# Patient Record
Sex: FEMALE | Race: WHITE | Employment: FULL TIME | URBAN - METROPOLITAN AREA
[De-identification: names, ages, dates, MRNs, and addresses within clinical notes are randomized per-mention and may not be internally consistent; named-entity substitution may affect disease eponyms.]

---

## 2017-01-29 ENCOUNTER — APPOINTMENT (OUTPATIENT)
Dept: GENERAL RADIOLOGY | Facility: HOSPITAL | Age: 42
End: 2017-01-29
Attending: EMERGENCY MEDICINE
Payer: MEDICAID

## 2017-01-29 ENCOUNTER — HOSPITAL ENCOUNTER (EMERGENCY)
Facility: HOSPITAL | Age: 42
Discharge: HOME | End: 2017-01-29
Attending: EMERGENCY MEDICINE
Payer: MEDICAID

## 2017-01-29 ENCOUNTER — APPOINTMENT (OUTPATIENT)
Dept: GENERAL RADIOLOGY | Facility: HOSPITAL | Age: 42
End: 2017-01-29
Payer: MEDICAID

## 2017-01-29 VITALS
RESPIRATION RATE: 16 BRPM | OXYGEN SATURATION: 95 % | DIASTOLIC BLOOD PRESSURE: 81 MMHG | WEIGHT: 188 LBS | SYSTOLIC BLOOD PRESSURE: 125 MMHG | HEIGHT: 63 IN | HEART RATE: 96 BPM | BODY MASS INDEX: 33.31 KG/M2

## 2017-01-29 DIAGNOSIS — K59.00 CONSTIPATION, UNSPECIFIED CONSTIPATION TYPE: ICD-10-CM

## 2017-01-29 DIAGNOSIS — R07.89 CHEST PAIN, ATYPICAL: Primary | ICD-10-CM

## 2017-01-29 LAB
ANION GAP SERPL CALC-SCNC: 9 MMOL/L (ref 0–18)
BASOPHILS # BLD: 0.1 K/UL (ref 0–0.2)
BASOPHILS NFR BLD: 1 %
BUN SERPL-MCNC: 14 MG/DL (ref 8–20)
BUN/CREAT SERPL: 13.3 (ref 10–20)
CALCIUM SERPL-MCNC: 9.4 MG/DL (ref 8.5–10.5)
CHLORIDE SERPL-SCNC: 104 MMOL/L (ref 95–110)
CO2 SERPL-SCNC: 26 MMOL/L (ref 22–32)
CREAT SERPL-MCNC: 1.05 MG/DL (ref 0.5–1.5)
EOSINOPHIL # BLD: 0.2 K/UL (ref 0–0.7)
EOSINOPHIL NFR BLD: 2 %
ERYTHROCYTE [DISTWIDTH] IN BLOOD BY AUTOMATED COUNT: 14.2 % (ref 11–15)
GLUCOSE SERPL-MCNC: 98 MG/DL (ref 70–99)
HCT VFR BLD AUTO: 41.8 % (ref 35–48)
HGB BLD-MCNC: 14 G/DL (ref 12–16)
LIPASE SERPL-CCNC: 22 U/L (ref 22–51)
LYMPHOCYTES # BLD: 2.7 K/UL (ref 1–4)
LYMPHOCYTES NFR BLD: 25 %
MCH RBC QN AUTO: 29 PG (ref 27–32)
MCHC RBC AUTO-ENTMCNC: 33.6 G/DL (ref 32–37)
MCV RBC AUTO: 86.4 FL (ref 80–100)
MONOCYTES # BLD: 1 K/UL (ref 0–1)
MONOCYTES NFR BLD: 9 %
NEUTROPHILS # BLD AUTO: 6.8 K/UL (ref 1.8–7.7)
NEUTROPHILS NFR BLD: 63 %
OSMOLALITY UR CALC.SUM OF ELEC: 288 MOSM/KG (ref 275–295)
PLATELET # BLD AUTO: 413 K/UL (ref 140–400)
PMV BLD AUTO: 8.2 FL (ref 7.4–10.3)
POTASSIUM SERPL-SCNC: 3.5 MMOL/L (ref 3.3–5.1)
RBC # BLD AUTO: 4.84 M/UL (ref 3.7–5.4)
SODIUM SERPL-SCNC: 139 MMOL/L (ref 136–144)
TROPONIN I SERPL-MCNC: 0 NG/ML (ref ?–0.03)
TROPONIN I SERPL-MCNC: 0.01 NG/ML (ref ?–0.03)
WBC # BLD AUTO: 10.8 K/UL (ref 4–11)

## 2017-01-29 PROCEDURE — 71020 XR CHEST PA + LAT CHEST (CPT=71020): CPT

## 2017-01-29 PROCEDURE — 84484 ASSAY OF TROPONIN QUANT: CPT | Performed by: EMERGENCY MEDICINE

## 2017-01-29 PROCEDURE — 93010 ELECTROCARDIOGRAM REPORT: CPT | Performed by: EMERGENCY MEDICINE

## 2017-01-29 PROCEDURE — 85025 COMPLETE CBC W/AUTO DIFF WBC: CPT

## 2017-01-29 PROCEDURE — 36415 COLL VENOUS BLD VENIPUNCTURE: CPT

## 2017-01-29 PROCEDURE — 84484 ASSAY OF TROPONIN QUANT: CPT

## 2017-01-29 PROCEDURE — 99284 EMERGENCY DEPT VISIT MOD MDM: CPT

## 2017-01-29 PROCEDURE — 74000 XR ABDOMEN (1 VIEW) (CPT=74000): CPT

## 2017-01-29 PROCEDURE — 80048 BASIC METABOLIC PNL TOTAL CA: CPT

## 2017-01-29 PROCEDURE — 83690 ASSAY OF LIPASE: CPT | Performed by: EMERGENCY MEDICINE

## 2017-01-29 PROCEDURE — 93005 ELECTROCARDIOGRAM TRACING: CPT

## 2017-01-29 NOTE — ED INITIAL ASSESSMENT (HPI)
Pt to ed27 from home for c/o sharp chest pain that radiates to her right arm and right side of neck that has been ongoing for about an hour. Pt states she is sob.

## 2017-01-29 NOTE — ED PROVIDER NOTES
Patient Seen in: [facility] Emergency Department    History   Patient presents with:  Chest Pain Angina (cardiovascular)    Stated Complaint: chest pain    HPI    Patient is a 40-year-old female who presents to the emergency room with a chief comp Smoking Status: Never Smoker                      Alcohol Use: No                Review of Systems    Positive for stated complaint: chest pain  Other systems are as noted in HPI. Constitutional and vital signs reviewed.       All other systems reviewed The following orders were created for panel order CBC WITH DIFFERENTIAL WITH PLATELET.   Procedure                               Abnormality         Status                     ---------                               -----------         ------

## 2017-01-29 NOTE — ED NOTES
Pt to ed27 from home for c/o sharp chest pain that awoke her from her sleep about an hour ago. Pain radiates to her right neck and right arm. Pt also states she feels sob.  Pt stated this has happened to her before a few months ago and all cardiac tests wer

## 2017-06-27 ENCOUNTER — OFFICE VISIT (OUTPATIENT)
Dept: FAMILY MEDICINE CLINIC | Facility: CLINIC | Age: 42
End: 2017-06-27

## 2017-06-27 VITALS
DIASTOLIC BLOOD PRESSURE: 66 MMHG | TEMPERATURE: 99 F | HEIGHT: 63 IN | SYSTOLIC BLOOD PRESSURE: 97 MMHG | WEIGHT: 192 LBS | HEART RATE: 88 BPM | BODY MASS INDEX: 34.02 KG/M2

## 2017-06-27 DIAGNOSIS — R42 VERTIGO: Primary | ICD-10-CM

## 2017-06-27 PROBLEM — E03.9 ACQUIRED HYPOTHYROIDISM: Status: ACTIVE | Noted: 2017-06-27

## 2017-06-27 PROBLEM — F32.A DEPRESSION: Status: ACTIVE | Noted: 2017-06-27

## 2017-06-27 PROBLEM — K21.9 GASTROESOPHAGEAL REFLUX DISEASE: Status: ACTIVE | Noted: 2017-06-27

## 2017-06-27 PROCEDURE — 99213 OFFICE O/P EST LOW 20 MIN: CPT | Performed by: PHYSICIAN ASSISTANT

## 2017-06-27 PROCEDURE — 99212 OFFICE O/P EST SF 10 MIN: CPT | Performed by: PHYSICIAN ASSISTANT

## 2017-06-27 RX ORDER — PANTOPRAZOLE SODIUM 40 MG/1
40 TABLET, DELAYED RELEASE ORAL DAILY
Refills: 3 | COMMUNITY
Start: 2017-05-09 | End: 2019-02-25

## 2017-06-27 RX ORDER — PAROXETINE 30 MG/1
20 TABLET, FILM COATED ORAL DAILY
Refills: 0 | COMMUNITY
Start: 2017-05-11 | End: 2017-06-27

## 2017-06-27 RX ORDER — PAROXETINE 30 MG/1
30 TABLET, FILM COATED ORAL DAILY
Qty: 30 TABLET | Refills: 5 | Status: SHIPPED | OUTPATIENT
Start: 2017-06-27 | End: 2017-12-17

## 2017-06-27 RX ORDER — BUPROPION HYDROCHLORIDE 150 MG/1
150 TABLET ORAL DAILY
Refills: 0 | COMMUNITY
Start: 2017-05-11 | End: 2017-06-27

## 2017-06-27 RX ORDER — BUPROPION HYDROCHLORIDE 150 MG/1
150 TABLET ORAL DAILY
Qty: 30 TABLET | Refills: 5 | Status: SHIPPED | OUTPATIENT
Start: 2017-06-27 | End: 2017-12-17

## 2017-06-27 RX ORDER — ONDANSETRON 4 MG/1
4 TABLET, FILM COATED ORAL EVERY 8 HOURS PRN
Qty: 30 TABLET | Refills: 0 | Status: SHIPPED | OUTPATIENT
Start: 2017-06-27 | End: 2018-08-21

## 2017-06-27 RX ORDER — LEVONORGESTREL 52 MG/1
INTRAUTERINE DEVICE INTRAUTERINE
Refills: 0 | COMMUNITY
Start: 2017-05-01

## 2017-06-27 NOTE — PROGRESS NOTES
HPI:    Patient ID: Jim Bundy is a 42year old female. Patient presents for severe dizziness and vomiting for past few days.   She has history of positional vertigo and states in past would have to lay in bed for a couple days then symptoms would r 30 tablet Rfl: 4   Albuterol Sulfate HFA (PROAIR HFA) 108 (90 BASE) MCG/ACT Inhalation Aero Soln Inhale  into the lungs. Disp:  Rfl:    Multiple Vitamin (MULTIVITAMINS) Oral Cap Take  by mouth.  Disp:  Rfl:    MIRENA, 52 MG, 20 MCG/24HR Intrauterine IUD  Nicky Chauhan Imaging & Referrals:  NEURO - INTERNAL       KV#[id number]

## 2017-08-16 ENCOUNTER — OFFICE VISIT (OUTPATIENT)
Dept: FAMILY MEDICINE CLINIC | Facility: CLINIC | Age: 42
End: 2017-08-16

## 2017-08-16 VITALS
SYSTOLIC BLOOD PRESSURE: 104 MMHG | BODY MASS INDEX: 33.63 KG/M2 | HEIGHT: 64 IN | DIASTOLIC BLOOD PRESSURE: 71 MMHG | HEART RATE: 88 BPM | WEIGHT: 197 LBS

## 2017-08-16 DIAGNOSIS — K13.0 LIP LESION: ICD-10-CM

## 2017-08-16 DIAGNOSIS — Z00.00 ROUTINE GENERAL MEDICAL EXAMINATION AT A HEALTH CARE FACILITY: Primary | ICD-10-CM

## 2017-08-16 DIAGNOSIS — E66.9 OBESITY (BMI 30.0-34.9): ICD-10-CM

## 2017-08-16 PROBLEM — D35.2 PITUITARY ADENOMA (HCC): Status: ACTIVE | Noted: 2017-08-16

## 2017-08-16 PROCEDURE — 99396 PREV VISIT EST AGE 40-64: CPT | Performed by: PHYSICIAN ASSISTANT

## 2017-08-16 NOTE — PROGRESS NOTES
HPI:   Bj Ray is a 42year old female who presents for physical exam.  She has history of hypothyroidism, pituitary adenoma, GERD and recurring positional vertigo. She has had recent endoscopy and colonoscopy and sees endocrinology regularly.   Carmina Past Surgical History:  2011: COLONOSCOPY  9/24/2012: ELECTROCARDIOGRAM, COMPLETE      Comment: scanned to media tab  No date: TUBAL LIGATION  2011: UPPER GI ENDOSCOPY,EXAM      Comment: EGD    Family History:     Family History   Problem Relation Age of anemia, bleeding or bruising. LYMPHATICS:  Denies enlarged nodes or history of splenectomy. ENDOCRINOLOGIC:  Denies excessive sweating, cold or heat intolerance, polyuria or polydipsia.   ALLERGIES:  Denies severe allergic response, history of asthma, sne year (around 8/16/2018).     HALLEY Tillman  8/16/2017

## 2017-10-16 ENCOUNTER — OFFICE VISIT (OUTPATIENT)
Dept: DERMATOLOGY CLINIC | Facility: CLINIC | Age: 42
End: 2017-10-16

## 2017-10-16 DIAGNOSIS — D48.5 NEOPLASM OF UNCERTAIN BEHAVIOR OF SKIN: Primary | ICD-10-CM

## 2017-10-16 DIAGNOSIS — B00.9 HERPES SIMPLEX VIRUS (HSV) INFECTION: ICD-10-CM

## 2017-10-16 PROCEDURE — 99202 OFFICE O/P NEW SF 15 MIN: CPT | Performed by: DERMATOLOGY

## 2017-10-16 PROCEDURE — 88305 TISSUE EXAM BY PATHOLOGIST: CPT | Performed by: DERMATOLOGY

## 2017-10-16 PROCEDURE — 99212 OFFICE O/P EST SF 10 MIN: CPT | Performed by: DERMATOLOGY

## 2017-10-16 PROCEDURE — 11100 BIOPSY OF SKIN LESION: CPT | Performed by: DERMATOLOGY

## 2017-10-16 RX ORDER — LEVOTHYROXINE SODIUM 88 UG/1
TABLET ORAL
Refills: 2 | COMMUNITY
Start: 2017-09-19

## 2017-10-16 RX ORDER — PREDNISOLONE ACETATE 10 MG/ML
SUSPENSION/ DROPS OPHTHALMIC
Refills: 0 | COMMUNITY
Start: 2017-09-28 | End: 2017-10-16 | Stop reason: ALTCHOICE

## 2017-10-16 RX ORDER — VALACYCLOVIR HYDROCHLORIDE 1 G/1
2 TABLET, FILM COATED ORAL 2 TIMES DAILY
Qty: 4 TABLET | Refills: 6 | Status: SHIPPED | OUTPATIENT
Start: 2017-10-16 | End: 2017-11-15

## 2017-10-16 NOTE — PROGRESS NOTES
Past Medical History:   Diagnosis Date   • Anxiety 2013    Zoloft   • Asthma    • Benign tumor of pituitary gland [facility])    • Chest pain 2011    hospitalize   • Depression     mes--stopped on own   • GERD (gastroesophageal reflux disease)    • IBS (irritabl

## 2017-10-16 NOTE — PROGRESS NOTES
HPI:     Chief Complaint     Lesion        HPI     Lesion    Additional comments: \"Established Pt\"- LOV- 3-11-14 with Dr. Rose Tierney presents today with brown lesions to lip one had x 1 year and others appeared over summer. Pt notes size change with lesion. Rfl:    Multiple Vitamin (MULTIVITAMINS) Oral Cap Take  by mouth. Disp:  Rfl:    Pantoprazole Sodium 40 MG Oral Tab EC Take 40 mg by mouth daily.  Disp:  Rfl: 3     Allergies:     Amoxicillin             Hives  Potassium               Hives  Vancomycin hyperpigmentation and then a very small medium brown spot just lateral to that. 2.  There are a few resolving blisters at the edge of the right lip. 3.  No present adenopathy.       ASSESSMENT/PLAN:   Neoplasm of uncertain behavior of skin  (primary encou

## 2017-10-19 ENCOUNTER — TELEPHONE (OUTPATIENT)
Dept: DERMATOLOGY CLINIC | Facility: CLINIC | Age: 42
End: 2017-10-19

## 2017-10-25 ENCOUNTER — HOSPITAL ENCOUNTER (EMERGENCY)
Facility: HOSPITAL | Age: 42
Discharge: HOME | End: 2017-10-25
Attending: EMERGENCY MEDICINE
Payer: MEDICAID

## 2017-10-25 ENCOUNTER — APPOINTMENT (OUTPATIENT)
Dept: GENERAL RADIOLOGY | Facility: HOSPITAL | Age: 42
End: 2017-10-25
Attending: EMERGENCY MEDICINE
Payer: MEDICAID

## 2017-10-25 VITALS
OXYGEN SATURATION: 96 % | WEIGHT: 198 LBS | TEMPERATURE: 98 F | SYSTOLIC BLOOD PRESSURE: 109 MMHG | BODY MASS INDEX: 35.08 KG/M2 | HEART RATE: 83 BPM | HEIGHT: 63 IN | RESPIRATION RATE: 16 BRPM | DIASTOLIC BLOOD PRESSURE: 67 MMHG

## 2017-10-25 DIAGNOSIS — R07.9 ACUTE CHEST PAIN: Primary | ICD-10-CM

## 2017-10-25 PROCEDURE — 36415 COLL VENOUS BLD VENIPUNCTURE: CPT

## 2017-10-25 PROCEDURE — 85379 FIBRIN DEGRADATION QUANT: CPT | Performed by: EMERGENCY MEDICINE

## 2017-10-25 PROCEDURE — 81025 URINE PREGNANCY TEST: CPT

## 2017-10-25 PROCEDURE — 93010 ELECTROCARDIOGRAM REPORT: CPT | Performed by: EMERGENCY MEDICINE

## 2017-10-25 PROCEDURE — 80048 BASIC METABOLIC PNL TOTAL CA: CPT | Performed by: EMERGENCY MEDICINE

## 2017-10-25 PROCEDURE — 93005 ELECTROCARDIOGRAM TRACING: CPT

## 2017-10-25 PROCEDURE — 85025 COMPLETE CBC W/AUTO DIFF WBC: CPT | Performed by: EMERGENCY MEDICINE

## 2017-10-25 PROCEDURE — 84484 ASSAY OF TROPONIN QUANT: CPT | Performed by: EMERGENCY MEDICINE

## 2017-10-25 PROCEDURE — 99285 EMERGENCY DEPT VISIT HI MDM: CPT

## 2017-10-25 PROCEDURE — 71010 XR CHEST AP PORTABLE  (CPT=71010): CPT | Performed by: EMERGENCY MEDICINE

## 2017-10-25 NOTE — ED PROVIDER NOTES
Patient Seen in: [facility] AND [facility] Emergency Department    History   Patient presents with:  Chest Pain Angina (cardiovascular)    Stated Complaint:     HPI    69-year-old female presents for complaint of chest pain and shortness of breath for the past 2 Cancer Other      great aunt       Smoking status: Never Smoker                                                              Smokeless tobacco: Never Used                      Alcohol use: No                Review of Systems   Constitutional: Negative. Psychiatric: She has a normal mood and affect. Her behavior is normal.   Nursing note and vitals reviewed.             ED Course     Labs Reviewed   BASIC METABOLIC PANEL (8) - Abnormal; Notable for the following:        Result Value    Glucose 109 (*) normal limit +1  __X__ < normal limit 0    Heart Score  __X__ 0-3: 0.9-1.7% risk of adverse cardiac event at 6 weeks. In the HEART Score, these patients were discharged. ____ 4-6: 12-16.6% risk of adverse cardiac event.    ____ >6: 50-65% risk of adverse c understanding and agreement with the treatment plan. All questions were addressed and answered.               Disposition and Plan     Clinical Impression:  Acute chest pain  (primary encounter diagnosis)    Disposition:  Discharge    Follow-up:  Shelli Thakur No

## 2017-10-27 ENCOUNTER — OFFICE VISIT (OUTPATIENT)
Dept: FAMILY MEDICINE CLINIC | Facility: CLINIC | Age: 42
End: 2017-10-27

## 2017-10-27 VITALS
HEART RATE: 93 BPM | HEIGHT: 63 IN | DIASTOLIC BLOOD PRESSURE: 79 MMHG | SYSTOLIC BLOOD PRESSURE: 123 MMHG | TEMPERATURE: 98 F | BODY MASS INDEX: 35.08 KG/M2 | WEIGHT: 198 LBS

## 2017-10-27 DIAGNOSIS — R42 VERTIGO: Primary | ICD-10-CM

## 2017-10-27 DIAGNOSIS — H81.10 BENIGN PAROXYSMAL POSITIONAL VERTIGO, UNSPECIFIED LATERALITY: ICD-10-CM

## 2017-10-27 DIAGNOSIS — R07.9 CHEST PAIN, UNSPECIFIED TYPE: ICD-10-CM

## 2017-10-27 PROCEDURE — 99212 OFFICE O/P EST SF 10 MIN: CPT | Performed by: PHYSICIAN ASSISTANT

## 2017-10-27 PROCEDURE — 99213 OFFICE O/P EST LOW 20 MIN: CPT | Performed by: PHYSICIAN ASSISTANT

## 2017-10-27 NOTE — PROGRESS NOTES
HPI:    Patient ID: Katalina See is a 36year old female. Patient presents for follow up from ER 10/25/17 for chest pains. She denies pain at this time. Cardiac workup was normal in ER. Troponin and D-dimer negative.   Patient states few days ago l 24 Hr Take 1 tablet (150 mg total) by mouth daily. Disp: 30 tablet Rfl: 5   Ondansetron HCl (ZOFRAN) 4 mg tablet Take 1 tablet (4 mg total) by mouth every 8 (eight) hours as needed for Nausea.  Disp: 30 tablet Rfl: 0   Albuterol Sulfate HFA (PROAIR HFA) 108

## 2017-10-31 ENCOUNTER — APPOINTMENT (OUTPATIENT)
Dept: LAB | Facility: HOSPITAL | Age: 42
End: 2017-10-31
Attending: INTERNAL MEDICINE
Payer: MEDICAID

## 2017-10-31 DIAGNOSIS — E66.9 OBESITY (BMI 30-39.9): ICD-10-CM

## 2017-10-31 DIAGNOSIS — F32.9 REACTIVE DEPRESSION: ICD-10-CM

## 2017-10-31 DIAGNOSIS — R06.83 SNORING: ICD-10-CM

## 2017-10-31 DIAGNOSIS — K21.9 GASTROESOPHAGEAL REFLUX DISEASE WITHOUT ESOPHAGITIS: ICD-10-CM

## 2017-10-31 DIAGNOSIS — R73.09 ABNORMAL BLOOD SUGAR: ICD-10-CM

## 2017-10-31 DIAGNOSIS — F51.01 PRIMARY INSOMNIA: ICD-10-CM

## 2017-10-31 PROBLEM — F50.2: Status: ACTIVE | Noted: 2017-10-31

## 2017-10-31 PROBLEM — R60.0 LOWER EXTREMITY EDEMA: Status: ACTIVE | Noted: 2017-10-31

## 2017-10-31 PROBLEM — F50.81 BINGE EATING DISORDER: Status: ACTIVE | Noted: 2017-10-31

## 2017-10-31 PROBLEM — F50.25 BULIMIA NERVOSA IN REMISSION: Status: ACTIVE | Noted: 2017-10-31

## 2017-10-31 PROBLEM — F50.2 BULIMIA NERVOSA IN REMISSION: Status: ACTIVE | Noted: 2017-10-31

## 2017-10-31 PROBLEM — F50.819 BINGE EATING DISORDER: Status: ACTIVE | Noted: 2017-10-31

## 2017-10-31 PROCEDURE — 83036 HEMOGLOBIN GLYCOSYLATED A1C: CPT

## 2017-10-31 PROCEDURE — 84425 ASSAY OF VITAMIN B-1: CPT

## 2017-10-31 PROCEDURE — 36415 COLL VENOUS BLD VENIPUNCTURE: CPT

## 2017-10-31 NOTE — PROGRESS NOTES
[facility] Consultation Note       Date of Consult:  10/31/2017    Patient:  Amy Lee  :      1975  MRN:      [id number]    Referring Provider: Dr. Rajni Mathews       Chief Complaint:  Patient presents with:  Consult  Weight Ma Sodium 88 MCG Oral Tab TK 1 T PO D Disp:  Rfl: 2   ValACYclovir HCl 1 G Oral Tab Take 2 tablets (2,000 mg total) by mouth 2 (two) times daily.  Take for 1 day at first sign or symptom Disp: 4 tablet Rfl: 6   PARoxetine HCl 30 MG Oral Tab Take 1 tablet (30 m Typical Dietary Intake:  Breakfast AM Snack Lunch PM Snack Dinner   Eggs, oatmeal, cereal, toast, raisin bran, 2%, tea with creamer  pb and j sandwich, chips, cranberry grape juice, fast food Candy bar, cookies, popcorn, chips,  Fast food, pasta, symptoms of heartburn or indigestion.       Encounter Diagnosis(ses):   Reactive depression  (primary encounter diagnosis)  Primary insomnia  Gastroesophageal reflux disease without esophagitis  Snoring  Lower extremity edema  Abnormal blood sugar  Obesity has to have an appointment to have the medication refilled. Will start at 15 mg    ekg recently done    Depression: has therapist    Hx of bulimia: always have to be careful about seizures.    Still admits to throwing up (not on purpose)  On wellbutrin fo

## 2017-11-01 ENCOUNTER — TELEPHONE (OUTPATIENT)
Dept: SURGERY | Facility: CLINIC | Age: 42
End: 2017-11-01

## 2017-11-01 NOTE — TELEPHONE ENCOUNTER
Per Dr. Kristine Munoz request, I called in Phentermine 15mg- take one capsule daily- #30- 1 refill to [facility] pharmacy ([phone]). Called Patient and informed her of call to pharmacy.

## 2017-11-07 ENCOUNTER — OFFICE VISIT (OUTPATIENT)
Dept: OTOLARYNGOLOGY | Facility: CLINIC | Age: 42
End: 2017-11-07

## 2017-11-07 ENCOUNTER — TELEPHONE (OUTPATIENT)
Dept: SURGERY | Facility: CLINIC | Age: 42
End: 2017-11-07

## 2017-11-07 VITALS
DIASTOLIC BLOOD PRESSURE: 56 MMHG | BODY MASS INDEX: 34.57 KG/M2 | HEART RATE: 68 BPM | HEIGHT: 63.7 IN | WEIGHT: 200 LBS | SYSTOLIC BLOOD PRESSURE: 104 MMHG

## 2017-11-07 DIAGNOSIS — R42 VERTIGO: Primary | ICD-10-CM

## 2017-11-07 PROCEDURE — 99203 OFFICE O/P NEW LOW 30 MIN: CPT | Performed by: OTOLARYNGOLOGY

## 2017-11-07 PROCEDURE — 99212 OFFICE O/P EST SF 10 MIN: CPT | Performed by: OTOLARYNGOLOGY

## 2017-11-07 NOTE — PROGRESS NOTES
Ben Rodriguez is a 36year old female. Patient presents with:  Consult: Vertigo w/vomiting for 4 years on/off & involuntary eye movement. Practiced at home Vertigo Exercise. Tried Meclizine and Bonine.  Has never had a hearing test. Reports 7 episodes wit Depression     mes--stopped on own   • GERD (gastroesophageal reflux disease)    • Hypercholesteremia    • IBS (irritable bowel syndrome)    • Insomnia 2013    Zoloft   • MRSA (methicillin resistant Staphylococcus aureus) 2012   • Obesity (BMI 30-39. 9) spinning vertigo. She has a history of a pituitary tumor. I recommended an MRI of her brain and internal auditory canals to rule out a central cause. If negative, we can consider vestibular testing  - MRI IACS (W+WO) (CPT=70553);  Future  - MRI BRAIN (W+WO)

## 2017-11-07 NOTE — TELEPHONE ENCOUNTER
Spoke with patient about lab results WNL and also asked if she can continue her stool softer . Advised to continue as it was not noted to stop.

## 2017-11-21 ENCOUNTER — TELEPHONE (OUTPATIENT)
Dept: OTOLARYNGOLOGY | Facility: CLINIC | Age: 42
End: 2017-11-21

## 2017-11-21 NOTE — TELEPHONE ENCOUNTER
Patient checking status of MRI of brain. [facility] was told someone would call her once approved to schedule. [facility] has been waiting since 11/07. Please call. Once approved.  Thank you

## 2017-11-25 NOTE — TELEPHONE ENCOUNTER
LMTCB. Email message sent via [employer]; still awaiting determination from insurance RE:  MRI.

## 2017-11-27 NOTE — TELEPHONE ENCOUNTER
Patient calling back. Informed of message below. And reminded her RN will call once authorization was approved.  Patient understood

## 2017-11-28 NOTE — TELEPHONE ENCOUNTER
Rec'd fax from [place] [employer]: MRI BRAIN/IACS has been denied. Dr. Azra Dent, please advise if you would like to go forward w/ [id number]. Fax and LOV note has been forwarded to your bin for review.

## 2017-11-30 NOTE — TELEPHONE ENCOUNTER
[id number] completed; awaiting decision from [employer] Ari[employer]. Per , Dr. Rip Sagastume will send information to Medical Director for review and determination will be made by 17. Pt informed and verbalized understanding.

## 2017-12-06 PROBLEM — Z51.81 ENCOUNTER FOR THERAPEUTIC DRUG MONITORING: Status: ACTIVE | Noted: 2017-12-06

## 2017-12-06 NOTE — PROGRESS NOTES
[address], [facility]  [address] [facility] [address] [facility] [address]  Dept: [phone]     Date:   2017    Patient:  Nancy Whitlock  :      1975  MRN:      [id number]    Chief Complaint: PARoxetine HCl 30 MG Oral Tab Take 1 tablet (30 mg total) by mouth daily. Disp: 30 tablet Rfl: 5   BuPROPion HCl ER, XL, 150 MG Oral Tablet 24 Hr Take 1 tablet (150 mg total) by mouth daily.  Disp: 30 tablet Rfl: 5   Ondansetron HCl (ZOFRAN) 4 mg tablet T meal:  20  · # of snacks per day: 1 Type of snacks:  Grapes, fruit  · Amount of soda consumption per day:  0  · Amount of water (in ounces) per day:  64  · Drinking between meals only:  yes  · Toughest challenge:  craving    Nutritional Goals  Limit carboh edema  Encounter for therapeutic drug monitoring  Obesity (bmi 30-39. 9)    PLAN   No orders of the defined types were placed in this encounter. Patient is not interested in bariatric surgery.  Patient desires to pursue traditional weight loss at this mami

## 2017-12-07 NOTE — TELEPHONE ENCOUNTER
Pt informed of the below information regarding denial of MRI after [id number]. Pt states she is okay but has some dizzy spells every now and then. Pt states she has a \"funky feeling\" when she opens her mouth, like her ears popping on an airplane.  Pt

## 2017-12-07 NOTE — TELEPHONE ENCOUNTER
Called pt's insurance [phone], spoke with Deleta Crew from [employer], after clinical review by the medical director the case for MRI brain has been denied. Appeal information will be faxed to the our office within 24 hours, reference number [id number].

## 2017-12-08 ENCOUNTER — OFFICE VISIT (OUTPATIENT)
Dept: OTOLARYNGOLOGY | Facility: CLINIC | Age: 42
End: 2017-12-08

## 2017-12-08 ENCOUNTER — OFFICE VISIT (OUTPATIENT)
Dept: AUDIOLOGY | Facility: CLINIC | Age: 42
End: 2017-12-08

## 2017-12-08 VITALS
TEMPERATURE: 98 F | DIASTOLIC BLOOD PRESSURE: 62 MMHG | WEIGHT: 186 LBS | BODY MASS INDEX: 32.96 KG/M2 | SYSTOLIC BLOOD PRESSURE: 118 MMHG | HEIGHT: 63 IN

## 2017-12-08 DIAGNOSIS — H90.3 BILATERAL HIGH FREQUENCY SENSORINEURAL HEARING LOSS: Primary | ICD-10-CM

## 2017-12-08 DIAGNOSIS — H69.83 DYSFUNCTION OF BOTH EUSTACHIAN TUBES: Primary | ICD-10-CM

## 2017-12-08 DIAGNOSIS — R42 VERTIGO: ICD-10-CM

## 2017-12-08 PROCEDURE — 92557 COMPREHENSIVE HEARING TEST: CPT | Performed by: AUDIOLOGIST

## 2017-12-08 PROCEDURE — 99213 OFFICE O/P EST LOW 20 MIN: CPT | Performed by: OTOLARYNGOLOGY

## 2017-12-08 PROCEDURE — 92567 TYMPANOMETRY: CPT | Performed by: AUDIOLOGIST

## 2017-12-08 PROCEDURE — 99212 OFFICE O/P EST SF 10 MIN: CPT | Performed by: OTOLARYNGOLOGY

## 2017-12-08 NOTE — PROGRESS NOTES
Orville Herrera is a 36year old female. Patient presents with:  Ear Problem: patient presents for popping sensation of both ears    HPI:   Her vertigo has not been giving her as much difficulty in the last 2 weeks.  Her MRI was denied by her insurance comp Never Smoker                                                              Smokeless tobacco: Never Used                      Alcohol use:  No                 REVIEW OF SYSTEMS:   GENERAL HEALTH: feels well otherwise  GENERAL : denies fever, chills, sweats, problem for her for many years. Her insurance company denied her MRI. She is to return if problemscontinue      The patient indicates understanding of these issues and agrees to the plan. Omkar Sifuentes MD  12/8/2017  9:49 AM

## 2017-12-08 NOTE — PROGRESS NOTES
AUDIOGRAM     Greg Martinez was referred for testing by Gerri Parker M.D.due to c/o ears popping. 9/16/1975  [id number]    Otoscopic Inspection:  Right ear:  No cerumen  Left ear:  No cerumen    Audiometric Test Results:   Audiometric thresholds indic

## 2017-12-12 ENCOUNTER — TELEPHONE (OUTPATIENT)
Dept: OTOLARYNGOLOGY | Facility: CLINIC | Age: 42
End: 2017-12-12

## 2017-12-12 NOTE — TELEPHONE ENCOUNTER
Pt saw  on , states she was supposed to be prescribed a nasal spray, pt has contacted pharmacy and no prescriptions have been received. Pls advise thank you.

## 2017-12-12 NOTE — TELEPHONE ENCOUNTER
LMTCB. Per , pt may use antihistamines (Zyrtec, Allegra, or Claritin) and nasal spray such as Flonase.

## 2017-12-18 RX ORDER — PAROXETINE 30 MG/1
TABLET, FILM COATED ORAL
Qty: 30 TABLET | Refills: 0 | Status: SHIPPED | OUTPATIENT
Start: 2017-12-18 | End: 2018-01-22

## 2017-12-18 RX ORDER — BUPROPION HYDROCHLORIDE 150 MG/1
TABLET ORAL
Qty: 30 TABLET | Refills: 0 | Status: SHIPPED | OUTPATIENT
Start: 2017-12-18 | End: 2018-01-26 | Stop reason: DRUGHIGH

## 2017-12-18 NOTE — TELEPHONE ENCOUNTER
for Psychiatric Non-Scheduled (Anti-Anxiety)  Refill Protocol Appointment Criteria  · Appointment scheduled in the past 6 months or in the next 3 months  Recent Outpatient Visits            1 week ago Bilateral high frequency sensorineural hearing loss

## 2018-01-08 DIAGNOSIS — F51.01 PRIMARY INSOMNIA: ICD-10-CM

## 2018-01-08 DIAGNOSIS — F32.9 REACTIVE DEPRESSION: ICD-10-CM

## 2018-01-08 DIAGNOSIS — R06.83 SNORING: ICD-10-CM

## 2018-01-08 DIAGNOSIS — R73.09 ABNORMAL BLOOD SUGAR: ICD-10-CM

## 2018-01-08 DIAGNOSIS — K21.9 GASTROESOPHAGEAL REFLUX DISEASE WITHOUT ESOPHAGITIS: ICD-10-CM

## 2018-01-08 DIAGNOSIS — E66.9 OBESITY (BMI 30-39.9): ICD-10-CM

## 2018-01-08 DIAGNOSIS — R60.0 LOWER EXTREMITY EDEMA: ICD-10-CM

## 2018-01-09 RX ORDER — PHENTERMINE HYDROCHLORIDE 15 MG/1
CAPSULE ORAL
Qty: 30 CAPSULE | Refills: 1 | Status: SHIPPED | OUTPATIENT
Start: 2018-01-09 | End: 2018-02-06 | Stop reason: DRUGHIGH

## 2018-01-09 RX ORDER — HYDROCHLOROTHIAZIDE 12.5 MG/1
CAPSULE, GELATIN COATED ORAL
Qty: 30 CAPSULE | Refills: 0 | Status: SHIPPED | OUTPATIENT
Start: 2018-01-09 | End: 2018-06-05

## 2018-01-10 ENCOUNTER — TELEPHONE (OUTPATIENT)
Dept: SURGERY | Facility: CLINIC | Age: 43
End: 2018-01-10

## 2018-01-10 NOTE — TELEPHONE ENCOUNTER
Spoke to [facility]  From [facility], refill ok'd on phentermine hcl 15mg oral cap #30 one  refill.

## 2018-01-23 RX ORDER — PAROXETINE 30 MG/1
TABLET, FILM COATED ORAL
Qty: 30 TABLET | Refills: 5 | Status: SHIPPED | OUTPATIENT
Start: 2018-01-23 | End: 2018-07-15

## 2018-01-26 ENCOUNTER — OFFICE VISIT (OUTPATIENT)
Dept: FAMILY MEDICINE CLINIC | Facility: CLINIC | Age: 43
End: 2018-01-26

## 2018-01-26 VITALS
WEIGHT: 176 LBS | TEMPERATURE: 98 F | SYSTOLIC BLOOD PRESSURE: 103 MMHG | DIASTOLIC BLOOD PRESSURE: 72 MMHG | BODY MASS INDEX: 31 KG/M2 | HEART RATE: 80 BPM

## 2018-01-26 DIAGNOSIS — F41.1 GENERALIZED ANXIETY DISORDER: ICD-10-CM

## 2018-01-26 DIAGNOSIS — K59.09 CHRONIC CONSTIPATION: Primary | ICD-10-CM

## 2018-01-26 PROCEDURE — 99212 OFFICE O/P EST SF 10 MIN: CPT | Performed by: PHYSICIAN ASSISTANT

## 2018-01-26 PROCEDURE — 99213 OFFICE O/P EST LOW 20 MIN: CPT | Performed by: PHYSICIAN ASSISTANT

## 2018-01-26 RX ORDER — BUPROPION HYDROCHLORIDE 100 MG/1
100 TABLET ORAL 2 TIMES DAILY
Qty: 60 TABLET | Refills: 0 | Status: SHIPPED | OUTPATIENT
Start: 2018-01-26 | End: 2018-04-04

## 2018-01-26 NOTE — PROGRESS NOTES
HPI:    Patient ID: Orville Herrera is a 36year old female. Patient presents for problems with chronic constipation. She states only has bowel movement about once per week and only after using an enema.   She has tried many medications in past for cons MG Oral Cap TAKE ONE CAPSULE BY MOUTH EVERY MORNING Disp: 30 capsule Rfl: 1   Levothyroxine Sodium 88 MCG Oral Tab TK 1 T PO D Disp:  Rfl: 2   MIRENA, 52 MG, 20 MCG/24HR Intrauterine IUD  Disp:  Rfl: 0   Pantoprazole Sodium 40 MG Oral Tab EC Take 40 mg by placed in this encounter. Meds This Visit:  Signed Prescriptions Disp Refills    BuPROPion HCl 100 MG Oral Tab 60 tablet 0      Sig: Take 1 tablet (100 mg total) by mouth 2 (two) times daily.            Imaging & Referrals:  GASTRO - INTERNAL       ID#

## 2018-02-06 NOTE — PROGRESS NOTES
[address] [place] [address] [place]  [facility] [address] [facility] [address]  Dept: [phone]     Date:   2017    Patient:  Chavo Evans  :      1975  MRN:      [id number]    Chief Complaint: Levothyroxine Sodium 88 MCG Oral Tab TK 1 T PO D Disp:  Rfl: 2   Pantoprazole Sodium 40 MG Oral Tab EC Take 40 mg by mouth daily. Disp:  Rfl: 3   Albuterol Sulfate HFA (PROAIR HFA) 108 (90 BASE) MCG/ACT Inhalation Aero Soln Inhale  into the lungs.  Disp: 20  · # of snacks per day: 1 Type of snacks:  Grapes, fruit  · Amount of soda consumption per day:  0  · Amount of water (in ounces) per day:  64  · Drinking between meals only:  yes  · Toughest challenge:  craving    Nutritional Goals  Limit carbohydrates monitoring  Obesity (bmi 30-39. 9)    PLAN   No orders of the defined types were placed in this encounter. Patient is not interested in bariatric surgery. Patient desires to pursue traditional weight loss at this time.       NON SUICIDAL DEPRESSION: Mood

## 2018-02-21 ENCOUNTER — TELEPHONE (OUTPATIENT)
Dept: FAMILY MEDICINE CLINIC | Facility: CLINIC | Age: 43
End: 2018-02-21

## 2018-02-21 DIAGNOSIS — E66.9 OBESITY, UNSPECIFIED CLASSIFICATION, UNSPECIFIED OBESITY TYPE, UNSPECIFIED WHETHER SERIOUS COMORBIDITY PRESENT: Primary | ICD-10-CM

## 2018-02-21 NOTE — TELEPHONE ENCOUNTER
Pt was directly transferred to nurse station phone. Pt said she had been continuously transferred. Consuelo Davenport- Pt requesting orders for TSH/CMP or whatever labs you would advise. Pt said she has been very fatigued and a lot of headaches.  Also her restle

## 2018-02-21 NOTE — TELEPHONE ENCOUNTER
Pt called requesting follow-up visits with Sara Monroy. Please sign referral if you agree.  Thank you, [facility]

## 2018-02-23 ENCOUNTER — LAB ENCOUNTER (OUTPATIENT)
Dept: LAB | Age: 43
End: 2018-02-23
Attending: PHYSICIAN ASSISTANT
Payer: MEDICAID

## 2018-02-23 ENCOUNTER — OFFICE VISIT (OUTPATIENT)
Dept: FAMILY MEDICINE CLINIC | Facility: CLINIC | Age: 43
End: 2018-02-23

## 2018-02-23 VITALS
HEART RATE: 82 BPM | SYSTOLIC BLOOD PRESSURE: 107 MMHG | TEMPERATURE: 98 F | DIASTOLIC BLOOD PRESSURE: 70 MMHG | WEIGHT: 172 LBS | BODY MASS INDEX: 30 KG/M2

## 2018-02-23 DIAGNOSIS — E03.9 ACQUIRED HYPOTHYROIDISM: ICD-10-CM

## 2018-02-23 DIAGNOSIS — H81.10 POSTURAL VERTIGO, UNSPECIFIED LATERALITY: ICD-10-CM

## 2018-02-23 DIAGNOSIS — R53.83 FATIGUE, UNSPECIFIED TYPE: ICD-10-CM

## 2018-02-23 DIAGNOSIS — R53.83 FATIGUE, UNSPECIFIED TYPE: Primary | ICD-10-CM

## 2018-02-23 DIAGNOSIS — G25.81 RESTLESS LEG SYNDROME: ICD-10-CM

## 2018-02-23 PROBLEM — IMO0002 POSTURAL VERTIGO: Status: ACTIVE | Noted: 2017-06-27

## 2018-02-23 LAB
ALBUMIN SERPL BCP-MCNC: 4.1 G/DL (ref 3.5–4.8)
ALBUMIN/GLOB SERPL: 1.3 {RATIO} (ref 1–2)
ALP SERPL-CCNC: 75 U/L (ref 32–100)
ALT SERPL-CCNC: 17 U/L (ref 14–54)
ANION GAP SERPL CALC-SCNC: 9 MMOL/L (ref 0–18)
AST SERPL-CCNC: 15 U/L (ref 15–41)
BASOPHILS # BLD: 0.1 K/UL (ref 0–0.2)
BASOPHILS NFR BLD: 1 %
BILIRUB SERPL-MCNC: 0.6 MG/DL (ref 0.3–1.2)
BUN SERPL-MCNC: 12 MG/DL (ref 8–20)
BUN/CREAT SERPL: 12.6 (ref 10–20)
CALCIUM SERPL-MCNC: 9.4 MG/DL (ref 8.5–10.5)
CHLORIDE SERPL-SCNC: 105 MMOL/L (ref 95–110)
CHOLEST SERPL-MCNC: 203 MG/DL (ref 110–200)
CO2 SERPL-SCNC: 25 MMOL/L (ref 22–32)
CREAT SERPL-MCNC: 0.95 MG/DL (ref 0.5–1.5)
EOSINOPHIL # BLD: 0.1 K/UL (ref 0–0.7)
EOSINOPHIL NFR BLD: 2 %
ERYTHROCYTE [DISTWIDTH] IN BLOOD BY AUTOMATED COUNT: 14.6 % (ref 11–15)
GLOBULIN PLAS-MCNC: 3.1 G/DL (ref 2.5–3.7)
GLUCOSE SERPL-MCNC: 81 MG/DL (ref 70–99)
HCT VFR BLD AUTO: 42.2 % (ref 35–48)
HDLC SERPL-MCNC: 45 MG/DL
HGB BLD-MCNC: 14.4 G/DL (ref 12–16)
LDLC SERPL CALC-MCNC: 144 MG/DL (ref 0–99)
LYMPHOCYTES # BLD: 1.9 K/UL (ref 1–4)
LYMPHOCYTES NFR BLD: 30 %
MAGNESIUM SERPL-MCNC: 2 MG/DL (ref 1.8–2.5)
MCH RBC QN AUTO: 29.5 PG (ref 27–32)
MCHC RBC AUTO-ENTMCNC: 34.1 G/DL (ref 32–37)
MCV RBC AUTO: 86.4 FL (ref 80–100)
MONOCYTES # BLD: 0.6 K/UL (ref 0–1)
MONOCYTES NFR BLD: 9 %
NEUTROPHILS # BLD AUTO: 3.8 K/UL (ref 1.8–7.7)
NEUTROPHILS NFR BLD: 59 %
NONHDLC SERPL-MCNC: 158 MG/DL
OSMOLALITY UR CALC.SUM OF ELEC: 287 MOSM/KG (ref 275–295)
PATIENT FASTING: YES
PLATELET # BLD AUTO: 392 K/UL (ref 140–400)
PMV BLD AUTO: 9 FL (ref 7.4–10.3)
POTASSIUM SERPL-SCNC: 4.1 MMOL/L (ref 3.3–5.1)
PROT SERPL-MCNC: 7.2 G/DL (ref 5.9–8.4)
RBC # BLD AUTO: 4.89 M/UL (ref 3.7–5.4)
SODIUM SERPL-SCNC: 139 MMOL/L (ref 136–144)
T3 SERPL-MCNC: 1.12 NG/ML (ref 0.87–1.78)
T4 FREE SERPL-MCNC: 1.32 NG/DL (ref 0.58–1.64)
TRIGL SERPL-MCNC: 69 MG/DL (ref 1–149)
TSH SERPL-ACNC: 0.38 UIU/ML (ref 0.45–5.33)
VIT B12 SERPL-MCNC: 474 PG/ML (ref 181–914)
WBC # BLD AUTO: 6.4 K/UL (ref 4–11)

## 2018-02-23 PROCEDURE — 84480 ASSAY TRIIODOTHYRONINE (T3): CPT

## 2018-02-23 PROCEDURE — 99214 OFFICE O/P EST MOD 30 MIN: CPT | Performed by: PHYSICIAN ASSISTANT

## 2018-02-23 PROCEDURE — 99212 OFFICE O/P EST SF 10 MIN: CPT | Performed by: PHYSICIAN ASSISTANT

## 2018-02-23 PROCEDURE — 36415 COLL VENOUS BLD VENIPUNCTURE: CPT

## 2018-02-23 PROCEDURE — 84439 ASSAY OF FREE THYROXINE: CPT

## 2018-02-23 PROCEDURE — 82306 VITAMIN D 25 HYDROXY: CPT

## 2018-02-23 PROCEDURE — 80061 LIPID PANEL: CPT

## 2018-02-23 PROCEDURE — 84443 ASSAY THYROID STIM HORMONE: CPT

## 2018-02-23 PROCEDURE — 85025 COMPLETE CBC W/AUTO DIFF WBC: CPT

## 2018-02-23 PROCEDURE — 83735 ASSAY OF MAGNESIUM: CPT

## 2018-02-23 PROCEDURE — 82607 VITAMIN B-12: CPT

## 2018-02-23 PROCEDURE — 80053 COMPREHEN METABOLIC PANEL: CPT

## 2018-02-23 NOTE — PROGRESS NOTES
HPI:    Patient ID: Luis Epperson is a 36year old female. Patient presents for fatigue, bruising and worsening of her restless legs syndrome for past couple of months. She has been seeing Dr. Manfred Trevino and is on phentermine.   She has lost 30 pounds in Levothyroxine Sodium 88 MCG Oral Tab TK 1 T PO D Disp:  Rfl: 2   MIRENA, 52 MG, 20 MCG/24HR Intrauterine IUD  Disp:  Rfl: 0   Pantoprazole Sodium 40 MG Oral Tab EC Take 40 mg by mouth daily.  Disp:  Rfl: 3   Ondansetron HCl (ZOFRAN) 4 mg tablet Take 1 tab Metabolic Panel (14) [E]      Lipid Panel [E]      Vitamin B12 [E]      Vitamin D, 25-Hydroxy [E]      Magnesium [E]    Meds This Visit:  No prescriptions requested or ordered in this encounter    Imaging & Referrals:  None       #2018

## 2018-02-26 LAB — 25(OH)D3 SERPL-MCNC: 37 NG/ML

## 2018-03-07 ENCOUNTER — TELEPHONE (OUTPATIENT)
Dept: FAMILY MEDICINE CLINIC | Facility: CLINIC | Age: 43
End: 2018-03-07

## 2018-03-07 ENCOUNTER — OFFICE VISIT (OUTPATIENT)
Dept: PHYSICAL THERAPY | Facility: HOSPITAL | Age: 43
End: 2018-03-07
Attending: PHYSICIAN ASSISTANT
Payer: MEDICAID

## 2018-03-07 DIAGNOSIS — H81.10 POSTURAL VERTIGO, UNSPECIFIED LATERALITY: ICD-10-CM

## 2018-03-07 DIAGNOSIS — H81.10 POSTURAL VERTIGO, UNSPECIFIED LATERALITY: Primary | ICD-10-CM

## 2018-03-07 PROCEDURE — 95992 CANALITH REPOSITIONING PROC: CPT

## 2018-03-07 PROCEDURE — 97162 PT EVAL MOD COMPLEX 30 MIN: CPT

## 2018-03-07 NOTE — PROGRESS NOTES
PHYSICAL THERAPY EVALUATION:   Referring Physician: Dr. Jovana Howard  Diagnosis: BPPV      Date of Onset: 2 weeks ago Date of Service: 3/7/2018     PATIENT SUMMARY   Samir Sims is a 36year old y/o female who presents to therapy today with reports of pos significant nausea. Nausea was mitigated successfully with cervical cold pack. Pt. Had evidence of either bilateral involvement or cupulothiasis of the right posterior canal.  Will re-assess both sides next session and intervene appropriately.   Pt. would seen for 1-2 x/week or a total of 8 visits over a 90 day period.   Treatment will include: Home exercise program development and instruction, Patient/family education, Balance training, Canalith Repositioning Maneuver, Eye/head coordination exercises, Senso

## 2018-03-07 NOTE — TELEPHONE ENCOUNTER
PT department calling patient is in the office now with a 9:45am appointment, requesting new PT orders for Evaluation and treatment as orders in system are . Shawanda Fernandez PA-C please advise.

## 2018-03-09 ENCOUNTER — TELEPHONE (OUTPATIENT)
Dept: SURGERY | Facility: CLINIC | Age: 43
End: 2018-03-09

## 2018-03-09 RX ORDER — HYDROCHLOROTHIAZIDE 12.5 MG/1
12.5 TABLET ORAL DAILY
Qty: 90 TABLET | Refills: 3 | Status: SHIPPED | OUTPATIENT
Start: 2018-03-09 | End: 2019-02-25 | Stop reason: ALTCHOICE

## 2018-03-13 ENCOUNTER — OFFICE VISIT (OUTPATIENT)
Dept: GASTROENTEROLOGY | Facility: CLINIC | Age: 43
End: 2018-03-13

## 2018-03-13 ENCOUNTER — HOSPITAL ENCOUNTER (OUTPATIENT)
Dept: GENERAL RADIOLOGY | Facility: HOSPITAL | Age: 43
Discharge: HOME | End: 2018-03-13
Attending: PHYSICIAN ASSISTANT
Payer: MEDICAID

## 2018-03-13 VITALS
WEIGHT: 171 LBS | SYSTOLIC BLOOD PRESSURE: 116 MMHG | HEIGHT: 63.5 IN | BODY MASS INDEX: 29.92 KG/M2 | HEART RATE: 108 BPM | DIASTOLIC BLOOD PRESSURE: 74 MMHG

## 2018-03-13 DIAGNOSIS — K59.00 CONSTIPATION, UNSPECIFIED CONSTIPATION TYPE: ICD-10-CM

## 2018-03-13 DIAGNOSIS — K59.00 CONSTIPATION, UNSPECIFIED CONSTIPATION TYPE: Primary | ICD-10-CM

## 2018-03-13 PROCEDURE — 99244 OFF/OP CNSLTJ NEW/EST MOD 40: CPT | Performed by: PHYSICIAN ASSISTANT

## 2018-03-13 PROCEDURE — 99212 OFFICE O/P EST SF 10 MIN: CPT | Performed by: PHYSICIAN ASSISTANT

## 2018-03-13 PROCEDURE — 74018 RADEX ABDOMEN 1 VIEW: CPT | Performed by: PHYSICIAN ASSISTANT

## 2018-03-13 NOTE — H&P
Gastroenterology attending:    I have reviewed the medical record and thorough consultation of Jasmina Guzman PA-C.   The patient has a fairly complex past medical and past gastroenterology history,as outlined by Ms Becky Coronel including but not limited to:      \"#

## 2018-03-13 NOTE — PATIENT INSTRUCTIONS
1. Call to schedule the XR of the Abdomen. 2. After the XR, start MiraLAX one capful twice daily (once in the AM, once in the PM)  - This is a powder that you can mix in a glass of water    3.  Sign a release of records for the colonoscopy and upper endo

## 2018-03-13 NOTE — H&P
[address] [facility] [address] Gastroenterology                                                                                                  Clinic History and Physical     Pa been taken by this patient in over 1 year. She states her heartburn only happens when she eats too late at night or spaghetti sauce. Other than the vomiting described above, the patient reports she used to throw up 2 nights a week.  This has since resolved Depression     mes--stopped on own   • GERD (gastroesophageal reflux disease)    • Hypercholesteremia    • IBS (irritable bowel syndrome)    • Insomnia 2013    Zoloft   • MRSA (methicillin resistant Staphylococcus aureus) 2012   • Obesity (BMI 30-39. 9) MG Oral Tab Take 1 tablet (100 mg total) by mouth 2 (two) times daily. Disp: 60 tablet Rfl: 0   Pantoprazole Sodium 40 MG Oral Tab EC Take 40 mg by mouth daily.  Disp:  Rfl: 3       Allergies:    Amoxicillin             Hives  Potassium               Hives of constipation. Most recent labs with borderline low TSH, no anemia/transaminitis. It is very difficult to focus the patient today. # Constipation: She has tried numerous therapies - too many at once, I believe, worsening her possible IBS.  Apparently

## 2018-03-14 ENCOUNTER — OFFICE VISIT (OUTPATIENT)
Dept: PHYSICAL THERAPY | Facility: HOSPITAL | Age: 43
End: 2018-03-14
Attending: PHYSICIAN ASSISTANT
Payer: MEDICAID

## 2018-03-14 ENCOUNTER — TELEPHONE (OUTPATIENT)
Dept: GASTROENTEROLOGY | Facility: CLINIC | Age: 43
End: 2018-03-14

## 2018-03-14 PROCEDURE — 97112 NEUROMUSCULAR REEDUCATION: CPT

## 2018-03-14 NOTE — PROGRESS NOTES
Dx: BPPV         Number of Visits Seen/Authorized:  2/6         Referring MD (Next MD visit): Madiha  Precautions: fall risk   Medication Changes since last visit?: No  Subjective: Pt.  Does not notice a difference in dizziness, but has been avoiding the ri

## 2018-03-16 ENCOUNTER — APPOINTMENT (OUTPATIENT)
Dept: PHYSICAL THERAPY | Facility: HOSPITAL | Age: 43
End: 2018-03-16
Attending: PHYSICIAN ASSISTANT
Payer: MEDICAID

## 2018-03-16 NOTE — TELEPHONE ENCOUNTER
Did the patient have an upper endoscopy as well? In office she informed me of a bi-directional.     C-scope time to reach cecum 5 minutes, withdrawal 6 minutes. Prep was documented as excellent. 3/7/17 - Dr. Karen Cottrell ([facility]).  Findings: small, non-thr

## 2018-03-16 NOTE — TELEPHONE ENCOUNTER
I looked in Next [employer] and in an OV note with Dr Dong Proctor dated 06/27/12. Pt states she had a Colonoscopy/EGD 2 years prior at [facility] and was dx with IBS.  She was put on a probiotic and an antispasmotic

## 2018-03-19 NOTE — TELEPHONE ENCOUNTER
[place],    I spoke to Dr Jose Jenkins office. This is where Dr Yesica Zhu used to practice. Pt did in fact have an EGD last year. I faxed a MELISSA to [phone].  They will send the op report and the pathology

## 2018-03-19 NOTE — TELEPHONE ENCOUNTER
Noted, will await. Please leave encounter open and document on this encounter when the OP/Path come in. At that time, recommendations to the patient will be discussed.

## 2018-03-19 NOTE — TELEPHONE ENCOUNTER
Correct, however the patient advised me in office that she also had an upper endoscopy performed at the time of the most recent colonoscopy performed 3/7/17.

## 2018-03-20 ENCOUNTER — APPOINTMENT (OUTPATIENT)
Dept: PHYSICAL THERAPY | Facility: HOSPITAL | Age: 43
End: 2018-03-20
Attending: PHYSICIAN ASSISTANT
Payer: MEDICAID

## 2018-03-22 ENCOUNTER — APPOINTMENT (OUTPATIENT)
Dept: PHYSICAL THERAPY | Facility: HOSPITAL | Age: 43
End: 2018-03-22
Attending: PHYSICIAN ASSISTANT
Payer: MEDICAID

## 2018-03-23 NOTE — TELEPHONE ENCOUNTER
EGD reviewed - this occurred on 3/7/17 @ [facility]   - Distal reflux esophagitis  - 2 cm hiatal hernia  - PATH: no H.  Pylori, fundic gland polyp and focalized specialized Graham's mucosa NEGATIVE for dysplasia    GI RNs please place recall 3/

## 2018-03-23 NOTE — TELEPHONE ENCOUNTER
Spoke with pt and notified. She is not taking the PPI daily, only uses when needed. She is currently asymptomatic in her upper GI/esophageal region. Inquires about her abdominal x-ray (confirmed stool).   States she has been taking miralax twice daily w

## 2018-03-26 NOTE — TELEPHONE ENCOUNTER
I discussed in office with the patient that she may have a type of Pelvic Floor Dysfunction. She has refused Amitiza, did not want to go down on the Linzess, and did not achieve results using conservative means.     Dr. Víctor Gilbert at [facility] be

## 2018-03-27 ENCOUNTER — APPOINTMENT (OUTPATIENT)
Dept: PHYSICAL THERAPY | Facility: HOSPITAL | Age: 43
End: 2018-03-27
Attending: PHYSICIAN ASSISTANT
Payer: MEDICAID

## 2018-03-28 NOTE — TELEPHONE ENCOUNTER
Spoke to pt, [employer] as shown below. Pt verbalized understanding and agreement on calling Dr. Sara Padilla as advised by PB. Pt had no further questions or concerns at this time.

## 2018-03-29 ENCOUNTER — APPOINTMENT (OUTPATIENT)
Dept: PHYSICAL THERAPY | Facility: HOSPITAL | Age: 43
End: 2018-03-29
Attending: PHYSICIAN ASSISTANT
Payer: MEDICAID

## 2018-04-04 NOTE — PROGRESS NOTES
[address], [facility]  [address] [facility] [address] [facility] [address]  Dept: [phone]     Date:   2017    Patient:  Katalina See  :      1975  MRN:      [id number]    Chief Complaint: 30 tablet Rfl: 5   HYDROCHLOROTHIAZIDE 12.5 MG Oral Cap TAKE 1 CAPSULE(12.5 MG) BY MOUTH DAILY Disp: 30 capsule Rfl: 0   Levothyroxine Sodium 88 MCG Oral Tab TK 1 T PO D Disp:  Rfl: 2   MIRENA, 52 MG, 20 MCG/24HR Intrauterine IUD  Disp:  Rfl: 0   Pantopraz exercising? yes  · Type of exercise?  Walking   · weights    Eating Habits  · Patient states the following:  · Eats 3 meal(s) per day  · Length of time it takes to consume a meal:  20  · # of snacks per day: 1 Type of snacks:  Grapes, fruit  · Amount of sod Diagnosis(ses):   Binge eating disorder  (primary encounter diagnosis)  Chronic constipation  Gastroesophageal reflux disease without esophagitis  Lower extremity edema  Encounter for therapeutic drug monitoring  Fatigue, unspecified type  Obesity (bmi 30-

## 2018-06-05 ENCOUNTER — OFFICE VISIT (OUTPATIENT)
Dept: SURGERY | Facility: CLINIC | Age: 43
End: 2018-06-05

## 2018-06-05 VITALS
OXYGEN SATURATION: 100 % | SYSTOLIC BLOOD PRESSURE: 128 MMHG | HEART RATE: 93 BPM | HEIGHT: 63.5 IN | DIASTOLIC BLOOD PRESSURE: 65 MMHG | RESPIRATION RATE: 16 BRPM | WEIGHT: 170 LBS | BODY MASS INDEX: 29.75 KG/M2

## 2018-06-05 DIAGNOSIS — F51.01 PRIMARY INSOMNIA: ICD-10-CM

## 2018-06-05 DIAGNOSIS — Z51.81 ENCOUNTER FOR THERAPEUTIC DRUG MONITORING: ICD-10-CM

## 2018-06-05 DIAGNOSIS — R06.83 SNORING: Primary | ICD-10-CM

## 2018-06-05 DIAGNOSIS — E66.9 OBESITY (BMI 30-39.9): ICD-10-CM

## 2018-06-05 DIAGNOSIS — F50.81 BINGE EATING DISORDER: ICD-10-CM

## 2018-06-05 DIAGNOSIS — F32.9 REACTIVE DEPRESSION: ICD-10-CM

## 2018-06-05 PROCEDURE — 99214 OFFICE O/P EST MOD 30 MIN: CPT | Performed by: INTERNAL MEDICINE

## 2018-06-05 RX ORDER — TOPIRAMATE 50 MG/1
50 TABLET, FILM COATED ORAL 2 TIMES DAILY
Qty: 60 TABLET | Refills: 2 | Status: SHIPPED | OUTPATIENT
Start: 2018-06-05 | End: 2018-06-28 | Stop reason: SINTOL

## 2018-06-05 RX ORDER — PHENTERMINE HYDROCHLORIDE 37.5 MG/1
37.5 TABLET ORAL
Qty: 30 TABLET | Refills: 1 | Status: SHIPPED | OUTPATIENT
Start: 2018-06-05 | End: 2018-07-26

## 2018-06-05 NOTE — PROGRESS NOTES
[address] [place] [address] [address] [facility] [address]  Dept: [phone]     Date:   2017    Patient:  Joanie De La Cruz  :      1975  MRN:      [id number]    Chief Complaint: Intrauterine IUD  Disp:  Rfl: 0   Pantoprazole Sodium 40 MG Oral Tab EC Take 40 mg by mouth daily. Disp:  Rfl: 3   Ondansetron HCl (ZOFRAN) 4 mg tablet Take 1 tablet (4 mg total) by mouth every 8 (eight) hours as needed for Nausea.  Disp: 30 tablet Rfl: 0 fruit  · Amount of soda consumption per day:  0  · Amount of water (in ounces) per day:  64  · Drinking between meals only:  yes  · Toughest challenge:  craving    Nutritional Goals  Limit carbohydrates to 100 gms per day, Eat 100-200 calories within 1 lisa placed in this encounter. Patient is not interested in bariatric surgery. Patient desires to pursue traditional weight loss at this time. NON SUICIDAL DEPRESSION: Mood stable on current medication. No changes initiated on this visit.       Binge eat

## 2018-06-27 ENCOUNTER — NURSE TRIAGE (OUTPATIENT)
Dept: OTHER | Age: 43
End: 2018-06-27

## 2018-06-27 NOTE — TELEPHONE ENCOUNTER
Action Requested: Summary for Provider     []  Critical Lab, Recommendations Needed  [] Need Additional Advice  []   FYI    []   Need Orders  [] Need Medications Sent to Pharmacy  []  Other     SUMMARY: OV made for tomorrow 6/2/8/18 at 09:30 AM in [facility]

## 2018-06-28 ENCOUNTER — OFFICE VISIT (OUTPATIENT)
Dept: FAMILY MEDICINE CLINIC | Facility: CLINIC | Age: 43
End: 2018-06-28

## 2018-06-28 ENCOUNTER — TELEPHONE (OUTPATIENT)
Dept: SURGERY | Facility: CLINIC | Age: 43
End: 2018-06-28

## 2018-06-28 VITALS
HEART RATE: 98 BPM | WEIGHT: 169 LBS | SYSTOLIC BLOOD PRESSURE: 103 MMHG | BODY MASS INDEX: 29.57 KG/M2 | HEIGHT: 63.5 IN | DIASTOLIC BLOOD PRESSURE: 72 MMHG

## 2018-06-28 DIAGNOSIS — R20.2 PARESTHESIA OF BOTH FEET: ICD-10-CM

## 2018-06-28 PROCEDURE — 99212 OFFICE O/P EST SF 10 MIN: CPT | Performed by: FAMILY MEDICINE

## 2018-06-28 PROCEDURE — 99213 OFFICE O/P EST LOW 20 MIN: CPT | Performed by: FAMILY MEDICINE

## 2018-06-28 NOTE — TELEPHONE ENCOUNTER
Dr. Sofia Dietrich,   Patient states that she is having some side effects from new medication. Please call her at ph# [phone]. Thank you.

## 2018-06-28 NOTE — PROGRESS NOTES
HPI:    Patient ID: Bernadette Latham is a 36year old female. Pt presents with some numbness or tingling of her feet that lasted a few minutes after she started topiramate for weight loss. Pt has been seeing Dr Meeta Lacy  Pt has no hx of diabetes.  Pt denies ASSESSMENT/PLAN:   Paresthesia of both feet: appears to be from her topirimate: reviewed previous lab work. No hx of DM  - After discussion, to hold topirimate; To monitor symptoms and call if worse or not better;  To follow up with Dr Nati Chand about altern

## 2018-06-28 NOTE — TELEPHONE ENCOUNTER
Called patient back to discuss side effects she was having to medication    Tingling in feet after starting topiramate    Advised to hold topiramate at this time    Will talk about other options on next visit

## 2018-07-16 RX ORDER — PAROXETINE 30 MG/1
TABLET, FILM COATED ORAL
Qty: 90 TABLET | Refills: 0 | Status: SHIPPED | OUTPATIENT
Start: 2018-07-16 | End: 2018-10-20

## 2018-07-16 NOTE — TELEPHONE ENCOUNTER
Refill Protocol Appointment Criteria  · Appointment scheduled in the past 6 months or in the next 3 months  Recent Outpatient Visits            2 weeks ago Paresthesia of both Karen Sheriff MD    Office

## 2018-07-24 DIAGNOSIS — M54.50 CHRONIC LEFT-SIDED LOW BACK PAIN WITHOUT SCIATICA: Primary | ICD-10-CM

## 2018-07-24 DIAGNOSIS — G89.29 CHRONIC LEFT-SIDED LOW BACK PAIN WITHOUT SCIATICA: Primary | ICD-10-CM

## 2018-07-24 DIAGNOSIS — M47.816 SPONDYLOSIS OF LUMBAR REGION WITHOUT MYELOPATHY OR RADICULOPATHY: ICD-10-CM

## 2018-07-26 ENCOUNTER — OFFICE VISIT (OUTPATIENT)
Dept: SURGERY | Facility: CLINIC | Age: 43
End: 2018-07-26
Payer: MEDICAID

## 2018-07-26 VITALS
RESPIRATION RATE: 18 BRPM | BODY MASS INDEX: 30.1 KG/M2 | HEIGHT: 63.5 IN | HEART RATE: 106 BPM | SYSTOLIC BLOOD PRESSURE: 110 MMHG | WEIGHT: 172 LBS | DIASTOLIC BLOOD PRESSURE: 70 MMHG | OXYGEN SATURATION: 98 %

## 2018-07-26 DIAGNOSIS — Z51.81 ENCOUNTER FOR THERAPEUTIC DRUG MONITORING: ICD-10-CM

## 2018-07-26 DIAGNOSIS — R53.83 FATIGUE, UNSPECIFIED TYPE: ICD-10-CM

## 2018-07-26 DIAGNOSIS — K21.9 GASTROESOPHAGEAL REFLUX DISEASE WITHOUT ESOPHAGITIS: Primary | ICD-10-CM

## 2018-07-26 DIAGNOSIS — F50.81 BINGE EATING DISORDER: ICD-10-CM

## 2018-07-26 DIAGNOSIS — E88.81 INSULIN RESISTANCE: ICD-10-CM

## 2018-07-26 DIAGNOSIS — R60.0 LOWER EXTREMITY EDEMA: ICD-10-CM

## 2018-07-26 DIAGNOSIS — E66.9 OBESITY (BMI 30-39.9): ICD-10-CM

## 2018-07-26 PROCEDURE — 99214 OFFICE O/P EST MOD 30 MIN: CPT | Performed by: INTERNAL MEDICINE

## 2018-07-26 RX ORDER — PHENTERMINE HYDROCHLORIDE 37.5 MG/1
37.5 TABLET ORAL
Qty: 30 TABLET | Refills: 1 | Status: SHIPPED | OUTPATIENT
Start: 2018-07-26 | End: 2018-08-05

## 2018-07-26 NOTE — PROGRESS NOTES
[address], [address] [facility] [place] [address]  Dept: [phone]     Date:   2017    Patient:  Amy Lee  :      1975  MRN:      [id number]    Chief Complaint: Rfl: 1   hydrochlorothiazide 12.5 MG Oral Tab Take 1 tablet (12.5 mg total) by mouth daily.  Disp: 90 tablet Rfl: 3   Levothyroxine Sodium 88 MCG Oral Tab TK 1 T PO D Disp:  Rfl: 2   MIRENA, 52 MG, 20 MCG/24HR Intrauterine IUD  Disp:  Rfl: 0   Pantoprazole exercise?  Walking   · weights    Eating Habits  · Patient states the following:  · Eats 3 meal(s) per day  · Length of time it takes to consume a meal:  20  · # of snacks per day: 1 Type of snacks:  Grapes, fruit  · Amount of soda consumption per day:  0 Gastroesophageal reflux disease without esophagitis  (primary encounter diagnosis)  Lower extremity edema  Binge eating disorder  Encounter for therapeutic drug monitoring  Obesity (bmi 30-39. 9)  Fatigue, unspecified type  Insulin resistance    PLAN   No

## 2018-08-06 ENCOUNTER — TELEPHONE (OUTPATIENT)
Dept: SURGERY | Facility: CLINIC | Age: 43
End: 2018-08-06

## 2018-08-06 RX ORDER — PHENTERMINE HYDROCHLORIDE 37.5 MG/1
TABLET ORAL
Qty: 30 TABLET | Refills: 0 | Status: SHIPPED | OUTPATIENT
Start: 2018-08-06 | End: 2018-09-20

## 2018-08-21 ENCOUNTER — OFFICE VISIT (OUTPATIENT)
Dept: OBGYN CLINIC | Facility: CLINIC | Age: 43
End: 2018-08-21
Payer: MEDICAID

## 2018-08-21 VITALS
SYSTOLIC BLOOD PRESSURE: 110 MMHG | DIASTOLIC BLOOD PRESSURE: 77 MMHG | BODY MASS INDEX: 30.8 KG/M2 | WEIGHT: 176 LBS | HEART RATE: 98 BPM | HEIGHT: 63.25 IN

## 2018-08-21 DIAGNOSIS — Z12.4 SCREENING FOR MALIGNANT NEOPLASM OF CERVIX: ICD-10-CM

## 2018-08-21 DIAGNOSIS — Z12.31 ENCOUNTER FOR SCREENING MAMMOGRAM FOR BREAST CANCER: ICD-10-CM

## 2018-08-21 DIAGNOSIS — Z01.419 ENCOUNTER FOR GYNECOLOGICAL EXAMINATION: Primary | ICD-10-CM

## 2018-08-21 DIAGNOSIS — Z11.3 SCREEN FOR STD (SEXUALLY TRANSMITTED DISEASE): ICD-10-CM

## 2018-08-21 PROCEDURE — 99386 PREV VISIT NEW AGE 40-64: CPT | Performed by: OBSTETRICS & GYNECOLOGY

## 2018-08-22 LAB
C TRACH DNA SPEC QL NAA+PROBE: NEGATIVE
HPV I/H RISK 1 DNA SPEC QL NAA+PROBE: NEGATIVE
N GONORRHOEA DNA SPEC QL NAA+PROBE: NEGATIVE
T VAGINALIS RRNA SPEC QL NAA+PROBE: NEGATIVE

## 2018-08-22 NOTE — PROGRESS NOTES
Bernadette Latham is a 36year old female [id number] Patient's last menstrual period was 07/21/2018. here for annual exam.       New pt. Had Mirena IUD placed 3/2017 elsewhere. Had spotting daily until recently.   Since IUD insertion, had acne on body, ears po BREAKFAST Disp: 30 tablet Rfl: 0   PAROXETINE HCL 30 MG Oral Tab TAKE 1 TABLET(30 MG) BY MOUTH DAILY Disp: 90 tablet Rfl: 0   hydrochlorothiazide 12.5 MG Oral Tab Take 1 tablet (12.5 mg total) by mouth daily.  Disp: 90 tablet Rfl: 3   Levothyroxine Sodium 8 discharge, nipple retraction or skin changes  Abdomen:  soft, nontender, nondistended, no masses  Psychiatric:  Oriented to time, place, person and situation.  Appropriate mood and affect    Pelvic Exam:  External Genitalia: normal appearance, hair distribu

## 2018-08-28 ENCOUNTER — OFFICE VISIT (OUTPATIENT)
Dept: OTOLARYNGOLOGY | Facility: CLINIC | Age: 43
End: 2018-08-28
Payer: MEDICAID

## 2018-08-28 VITALS
SYSTOLIC BLOOD PRESSURE: 122 MMHG | TEMPERATURE: 98 F | WEIGHT: 177 LBS | DIASTOLIC BLOOD PRESSURE: 67 MMHG | BODY MASS INDEX: 30.59 KG/M2 | HEIGHT: 63.6 IN

## 2018-08-28 DIAGNOSIS — J32.9 CHRONIC SINUSITIS, UNSPECIFIED LOCATION: Primary | ICD-10-CM

## 2018-08-28 PROCEDURE — 99213 OFFICE O/P EST LOW 20 MIN: CPT | Performed by: OTOLARYNGOLOGY

## 2018-08-28 PROCEDURE — 99212 OFFICE O/P EST SF 10 MIN: CPT | Performed by: OTOLARYNGOLOGY

## 2018-08-28 NOTE — PROGRESS NOTES
Normal pap and negative HPV.   But return to clinic in 1 year for annual.   Negative gc/chlamydia/trichomonas

## 2018-08-28 NOTE — PROGRESS NOTES
Jg Payne is a 36year old female.  Patient presents with:  Ear Problem: popping noise in both ears, feels the need to pop  both ears, both ears feel clogged for 1 yr    HPI:   She has been continuing to feel that her ears are plugged and she is poppi rashes  RESPIRATORY: denies shortness of breath with exertion  NEURO: denies headaches    EXAM:   /67 (BP Location: Left arm, Patient Position: Sitting, Cuff Size: adult)   Temp 97.9 °F (36.6 °C) (Tympanic)   Ht 5' 3.6\" (1.615 m)   Wt 177 lb (80.3 k

## 2018-08-29 ENCOUNTER — TELEPHONE (OUTPATIENT)
Dept: OTOLARYNGOLOGY | Facility: CLINIC | Age: 43
End: 2018-08-29

## 2018-08-29 ENCOUNTER — HOSPITAL ENCOUNTER (OUTPATIENT)
Dept: MAMMOGRAPHY | Age: 43
Discharge: HOME | End: 2018-08-29
Attending: OBSTETRICS & GYNECOLOGY
Payer: MEDICAID

## 2018-08-29 DIAGNOSIS — Z12.31 ENCOUNTER FOR SCREENING MAMMOGRAM FOR BREAST CANCER: ICD-10-CM

## 2018-08-29 PROCEDURE — 77067 SCR MAMMO BI INCL CAD: CPT | Performed by: OBSTETRICS & GYNECOLOGY

## 2018-08-29 NOTE — TELEPHONE ENCOUNTER
Prior auth pending of CT sinus. Case # [id number], clinicals faxed to [facility] phone #[phone]  (fax # [phone]) , confirmation received.

## 2018-09-11 NOTE — TELEPHONE ENCOUNTER
Prior auth denied.  would like to appeal.pt will be coming in tomorrow to sign Representative designation form. Form placed at  .

## 2018-09-20 ENCOUNTER — TELEPHONE (OUTPATIENT)
Dept: SURGERY | Facility: CLINIC | Age: 43
End: 2018-09-20

## 2018-09-20 RX ORDER — PHENTERMINE HYDROCHLORIDE 37.5 MG/1
TABLET ORAL
Qty: 30 TABLET | Refills: 0 | Status: SHIPPED | OUTPATIENT
Start: 2018-09-20 | End: 2019-02-25 | Stop reason: ALTCHOICE

## 2018-09-24 ENCOUNTER — OFFICE VISIT (OUTPATIENT)
Dept: DERMATOLOGY CLINIC | Facility: CLINIC | Age: 43
End: 2018-09-24
Payer: MEDICAID

## 2018-09-24 DIAGNOSIS — L30.9 DERMATITIS: ICD-10-CM

## 2018-09-24 DIAGNOSIS — L71.0 PERIORAL DERMATITIS: Primary | ICD-10-CM

## 2018-09-24 DIAGNOSIS — D22.9 MULTIPLE NEVI: ICD-10-CM

## 2018-09-24 PROCEDURE — 99212 OFFICE O/P EST SF 10 MIN: CPT | Performed by: DERMATOLOGY

## 2018-09-24 PROCEDURE — 99214 OFFICE O/P EST MOD 30 MIN: CPT | Performed by: DERMATOLOGY

## 2018-09-24 RX ORDER — DOXYCYCLINE HYCLATE 50 MG/1
50 CAPSULE ORAL 2 TIMES DAILY
Qty: 60 CAPSULE | Refills: 12 | Status: SHIPPED | OUTPATIENT
Start: 2018-09-24 | End: 2019-02-25 | Stop reason: ALTCHOICE

## 2018-09-24 NOTE — PATIENT INSTRUCTIONS
Retinol is the ingredient to look for. TRACE brand . Use daily to dark spots/ingrown hairs, cyst.    Glycolic acid or salaylic acid  Lotions or \"daily peel\"

## 2018-09-25 ENCOUNTER — OFFICE VISIT (OUTPATIENT)
Dept: FAMILY MEDICINE CLINIC | Facility: CLINIC | Age: 43
End: 2018-09-25
Payer: MEDICAID

## 2018-09-25 ENCOUNTER — TELEPHONE (OUTPATIENT)
Dept: OTOLARYNGOLOGY | Facility: CLINIC | Age: 43
End: 2018-09-25

## 2018-09-25 VITALS
RESPIRATION RATE: 20 BRPM | DIASTOLIC BLOOD PRESSURE: 73 MMHG | HEART RATE: 81 BPM | HEIGHT: 63 IN | BODY MASS INDEX: 31.54 KG/M2 | TEMPERATURE: 99 F | SYSTOLIC BLOOD PRESSURE: 113 MMHG | WEIGHT: 178 LBS

## 2018-09-25 DIAGNOSIS — J02.9 SORE THROAT: ICD-10-CM

## 2018-09-25 PROCEDURE — 99213 OFFICE O/P EST LOW 20 MIN: CPT | Performed by: FAMILY MEDICINE

## 2018-09-25 PROCEDURE — 99212 OFFICE O/P EST SF 10 MIN: CPT | Performed by: FAMILY MEDICINE

## 2018-09-25 RX ORDER — AZITHROMYCIN 250 MG/1
TABLET, FILM COATED ORAL
Qty: 6 TABLET | Refills: 0 | Status: SHIPPED | OUTPATIENT
Start: 2018-09-25 | End: 2018-10-25 | Stop reason: ALTCHOICE

## 2018-09-25 NOTE — PROGRESS NOTES
HPI:    Patient ID: Bernadette Latham is a 39year old female. Pt presents with sore throat for 1-2 days. Pt has had sore throat. No fevers. Pt has tried otc remedies without relief. Pt states no sick contacts.            Review of Systems   Constitutional sounds. Pulmonary/Chest: Effort normal and breath sounds normal. No respiratory distress. She has no wheezes. She has no rales. Lymphadenopathy:     She has cervical adenopathy. Vitals reviewed.              ASSESSMENT/PLAN:   Sore throat:   - After d

## 2018-10-07 NOTE — PROGRESS NOTES
Greg Martinez is a 39year old female. HPI:     CC:  Patient presents with:  Full Skin Exam: LOV 10/16/2017 with Dr. Jenny Woodward. Pt presnting for yearly full body skin exam. Pt denies personal hx of skin cancer.    Derm Problem: Pt concerned with scars and Levothyroxine Sodium 88 MCG Oral Tab TK 1 T PO D Disp:  Rfl: 2   MIRENA, 52 MG, 20 MCG/24HR Intrauterine IUD  Disp:  Rfl: 0   Pantoprazole Sodium 40 MG Oral Tab EC Take 40 mg by mouth daily.  Disp:  Rfl: 3   Multiple Vitamin (MULTIVITAMINS) Oral Cap Take and Sexual Activity      Alcohol use: No      Drug use: No      Sexual activity: Yes        Birth control/protection: IUD    Other Topics      Concerns:         Service: Not Asked        Blood Transfusions: Not Asked        Caffeine Concern:  Yes head, neck, face,nails, hair, external eyes, including conjunctival mucosa, eyelids, lips external ears, back, chest,/ breasts, axillae,  abdomen, arms, legs, palms.      Multiple light to medium brown, well marginated, uniformly pigmented, macules and papu Patient will let us know how they are doing over the next several weeks. Await clinical response to above therapy. Bruising discussed labs normal medications could be contributory. Sun protection. No other suspicious lesions. Await response.     Erica

## 2018-10-09 ENCOUNTER — TELEPHONE (OUTPATIENT)
Dept: OTOLARYNGOLOGY | Facility: CLINIC | Age: 43
End: 2018-10-09

## 2018-10-09 NOTE — TELEPHONE ENCOUNTER
Pt informed that appeal approved for CT scan , Auth # [id number] and expires 11/19/2018. Pt given number to central scheduling.

## 2018-10-19 ENCOUNTER — HOSPITAL ENCOUNTER (OUTPATIENT)
Dept: CT IMAGING | Facility: HOSPITAL | Age: 43
Discharge: HOME | End: 2018-10-19
Attending: OTOLARYNGOLOGY
Payer: MEDICAID

## 2018-10-19 DIAGNOSIS — J32.9 CHRONIC SINUSITIS, UNSPECIFIED LOCATION: ICD-10-CM

## 2018-10-19 PROCEDURE — 70486 CT MAXILLOFACIAL W/O DYE: CPT | Performed by: OTOLARYNGOLOGY

## 2018-10-21 NOTE — TELEPHONE ENCOUNTER
Please advise in regards to refill request. Thank You    Refill Protocol Appointment Criteria  · Appointment scheduled in the past 6 months or in the next 3 months  Recent Outpatient Visits            3 weeks ago Sore throat    [facility], [facility], [place]

## 2018-10-22 RX ORDER — PAROXETINE 30 MG/1
TABLET, FILM COATED ORAL
Qty: 90 TABLET | Refills: 1 | Status: SHIPPED | OUTPATIENT
Start: 2018-10-22 | End: 2019-04-19

## 2018-10-25 ENCOUNTER — OFFICE VISIT (OUTPATIENT)
Dept: OTOLARYNGOLOGY | Facility: CLINIC | Age: 43
End: 2018-10-25
Payer: MEDICAID

## 2018-10-25 VITALS
HEIGHT: 63 IN | WEIGHT: 171 LBS | BODY MASS INDEX: 30.3 KG/M2 | DIASTOLIC BLOOD PRESSURE: 60 MMHG | SYSTOLIC BLOOD PRESSURE: 102 MMHG | TEMPERATURE: 99 F

## 2018-10-25 DIAGNOSIS — H69.83 DYSFUNCTION OF BOTH EUSTACHIAN TUBES: Primary | ICD-10-CM

## 2018-10-25 PROCEDURE — 99213 OFFICE O/P EST LOW 20 MIN: CPT | Performed by: OTOLARYNGOLOGY

## 2018-10-25 PROCEDURE — 99212 OFFICE O/P EST SF 10 MIN: CPT | Performed by: OTOLARYNGOLOGY

## 2018-10-25 NOTE — PROGRESS NOTES
Nancy Whitlock is a 39year old female. Patient presents with:  Results: CT sinus done on 10-19-18    HPI:   She is in follow-up of a CT scan of her sinuses. I reviewed the CT scan myself and the findings with her.   CT demonstrates essentially clear sinu History    Tobacco Use      Smoking status: Never Smoker      Smokeless tobacco: Never Used    Alcohol use: No    Drug use: No       REVIEW OF SYSTEMS:   GENERAL HEALTH: feels well otherwise  GENERAL : denies fever, chills, sweats, weight loss, weight gain it.  We discussed management strategies. We did discuss briefly allergy testing but she is really asymptomatic from that standpoint as well.   She is to return for any problems      The patient indicates understanding of these issues and agrees to the plan

## 2018-10-29 ENCOUNTER — TELEPHONE (OUTPATIENT)
Dept: OTOLARYNGOLOGY | Facility: CLINIC | Age: 43
End: 2018-10-29

## 2018-10-29 NOTE — TELEPHONE ENCOUNTER
, pt  States she was talking to a family member about symptoms and was advised it may be TMJ?  Pt asking if she can speak with you, asking if you think this might be the cause and if she can see specialist. Please advise

## 2018-11-05 ENCOUNTER — TELEPHONE (OUTPATIENT)
Dept: OTOLARYNGOLOGY | Facility: CLINIC | Age: 43
End: 2018-11-05

## 2019-02-25 ENCOUNTER — OFFICE VISIT (OUTPATIENT)
Dept: FAMILY MEDICINE CLINIC | Facility: CLINIC | Age: 44
End: 2019-02-25
Payer: MEDICAID

## 2019-02-25 VITALS
BODY MASS INDEX: 33.66 KG/M2 | TEMPERATURE: 99 F | HEIGHT: 63 IN | DIASTOLIC BLOOD PRESSURE: 79 MMHG | WEIGHT: 190 LBS | SYSTOLIC BLOOD PRESSURE: 120 MMHG | HEART RATE: 99 BPM

## 2019-02-25 DIAGNOSIS — J45.998 SEASONAL ASTHMA: ICD-10-CM

## 2019-02-25 DIAGNOSIS — Z00.00 WELL ADULT EXAM: Primary | ICD-10-CM

## 2019-02-25 DIAGNOSIS — E03.9 HYPOTHYROIDISM, UNSPECIFIED TYPE: ICD-10-CM

## 2019-02-25 DIAGNOSIS — K59.09 CHRONIC CONSTIPATION: ICD-10-CM

## 2019-02-25 DIAGNOSIS — E66.9 OBESITY (BMI 30-39.9): ICD-10-CM

## 2019-02-25 DIAGNOSIS — F32.A ANXIETY AND DEPRESSION: ICD-10-CM

## 2019-02-25 DIAGNOSIS — F41.9 ANXIETY AND DEPRESSION: ICD-10-CM

## 2019-02-25 DIAGNOSIS — K21.9 GASTROESOPHAGEAL REFLUX DISEASE, ESOPHAGITIS PRESENCE NOT SPECIFIED: ICD-10-CM

## 2019-02-25 PROCEDURE — 99212 OFFICE O/P EST SF 10 MIN: CPT | Performed by: FAMILY MEDICINE

## 2019-02-25 PROCEDURE — 99214 OFFICE O/P EST MOD 30 MIN: CPT | Performed by: FAMILY MEDICINE

## 2019-02-25 PROCEDURE — 99396 PREV VISIT EST AGE 40-64: CPT | Performed by: FAMILY MEDICINE

## 2019-02-25 RX ORDER — PANTOPRAZOLE SODIUM 40 MG/1
40 TABLET, DELAYED RELEASE ORAL
Qty: 30 TABLET | Refills: 1 | Status: SHIPPED | OUTPATIENT
Start: 2019-02-25 | End: 2019-09-14

## 2019-02-25 RX ORDER — PAROXETINE 10 MG/1
10 TABLET, FILM COATED ORAL EVERY MORNING
Qty: 90 TABLET | Refills: 0 | Status: SHIPPED | OUTPATIENT
Start: 2019-02-25 | End: 2019-04-19

## 2019-02-25 NOTE — PROGRESS NOTES
HPI:    Patient ID: Jim Bundy is a 39year old female. HPI  Patient presents with:  Routine Physical  Referral: bariatrics      Patient here to establish care with me.   She has seen 1 of our physician assistants in the past.  She is requesting ref vomiting. Endocrine: Negative for cold intolerance, heat intolerance, polydipsia, polyphagia and polyuria.    Genitourinary: Negative for decreased urine volume, difficulty urinating, dysuria, flank pain, frequency, menstrual problem, pelvic pain, urgency file      Transportation needs:        Medical: Not on file        Non-medical: Not on file    Tobacco Use      Smoking status: Never Smoker      Smokeless tobacco: Never Used    Substance and Sexual Activity      Alcohol use: No      Drug use: No      Sex aunt age 46s         There is no immunization history on file for this patient.     Asthma Action Plan due on 09[birth year]16/1975  Asthma Control Test due on 09[birth year]16/1975  Influenza Vaccine(1) due on 09/01/2018  Annual Depression Screen due on 08/21/2019  Annual Physi and breath sounds normal. She has no wheezes. Abdominal: Soft. Bowel sounds are normal. She exhibits no mass. There is no tenderness. Musculoskeletal: She exhibits no edema or tenderness. Lymphadenopathy:     She has no cervical adenopathy.    Neurolo [E]      Lipid Panel [E]      TSH W Reflex To Free T4 [E]      Vitamin B12 [E]      Hemoglobin A1C [E]      Meds This Visit:  Requested Prescriptions     Signed Prescriptions Disp Refills   • Pantoprazole Sodium 40 MG Oral Tab EC 30 tablet 1     Sig: Take

## 2019-02-27 ENCOUNTER — LAB ENCOUNTER (OUTPATIENT)
Dept: LAB | Age: 44
End: 2019-02-27
Attending: FAMILY MEDICINE
Payer: MEDICAID

## 2019-02-27 DIAGNOSIS — Z00.00 WELL ADULT EXAM: ICD-10-CM

## 2019-02-27 DIAGNOSIS — E66.9 OBESITY (BMI 30-39.9): ICD-10-CM

## 2019-02-27 LAB
ALT SERPL-CCNC: 59 U/L (ref 13–56)
ANION GAP SERPL CALC-SCNC: 8 MMOL/L (ref 0–18)
AST SERPL-CCNC: 15 U/L (ref 15–37)
BASOPHILS # BLD AUTO: 0.04 X10(3) UL (ref 0–0.2)
BASOPHILS NFR BLD AUTO: 0.7 %
BUN BLD-MCNC: 15 MG/DL (ref 7–18)
BUN/CREAT SERPL: 17.9 (ref 10–20)
CALCIUM BLD-MCNC: 9 MG/DL (ref 8.5–10.1)
CHLORIDE SERPL-SCNC: 104 MMOL/L (ref 98–107)
CHOLEST SMN-MCNC: 234 MG/DL (ref ?–200)
CO2 SERPL-SCNC: 29 MMOL/L (ref 21–32)
CREAT BLD-MCNC: 0.84 MG/DL (ref 0.55–1.02)
DEPRECATED RDW RBC AUTO: 40.8 FL (ref 35.1–46.3)
EOSINOPHIL # BLD AUTO: 0.13 X10(3) UL (ref 0–0.7)
EOSINOPHIL NFR BLD AUTO: 2.3 %
ERYTHROCYTE [DISTWIDTH] IN BLOOD BY AUTOMATED COUNT: 12.6 % (ref 11–15)
EST. AVERAGE GLUCOSE BLD GHB EST-MCNC: 105 MG/DL (ref 68–126)
GLUCOSE BLD-MCNC: 94 MG/DL (ref 70–99)
HBA1C MFR BLD HPLC: 5.3 % (ref ?–5.7)
HCT VFR BLD AUTO: 41.1 % (ref 35–48)
HDLC SERPL-MCNC: 56 MG/DL (ref 40–59)
HGB BLD-MCNC: 14.1 G/DL (ref 12–16)
IMM GRANULOCYTES # BLD AUTO: 0.02 X10(3) UL (ref 0–1)
IMM GRANULOCYTES NFR BLD: 0.4 %
LDLC SERPL CALC-MCNC: 160 MG/DL (ref ?–100)
LYMPHOCYTES # BLD AUTO: 1.93 X10(3) UL (ref 1–4)
LYMPHOCYTES NFR BLD AUTO: 34.4 %
MCH RBC QN AUTO: 30.6 PG (ref 26–34)
MCHC RBC AUTO-ENTMCNC: 34.3 G/DL (ref 31–37)
MCV RBC AUTO: 89.2 FL (ref 80–100)
MONOCYTES # BLD AUTO: 0.42 X10(3) UL (ref 0.1–1)
MONOCYTES NFR BLD AUTO: 7.5 %
NEUTROPHILS # BLD AUTO: 3.07 X10 (3) UL (ref 1.5–7.7)
NEUTROPHILS # BLD AUTO: 3.07 X10(3) UL (ref 1.5–7.7)
NEUTROPHILS NFR BLD AUTO: 54.7 %
NONHDLC SERPL-MCNC: 178 MG/DL (ref ?–130)
OSMOLALITY SERPL CALC.SUM OF ELEC: 293 MOSM/KG (ref 275–295)
PLATELET # BLD AUTO: 371 10(3)UL (ref 150–450)
POTASSIUM SERPL-SCNC: 4.1 MMOL/L (ref 3.5–5.1)
RBC # BLD AUTO: 4.61 X10(6)UL (ref 3.8–5.3)
SODIUM SERPL-SCNC: 141 MMOL/L (ref 136–145)
TRIGL SERPL-MCNC: 90 MG/DL (ref 30–149)
TSI SER-ACNC: 1.13 MIU/ML (ref 0.36–3.74)
VIT B12 SERPL-MCNC: 616 PG/ML (ref 193–986)
VLDLC SERPL CALC-MCNC: 18 MG/DL (ref 0–30)
WBC # BLD AUTO: 5.6 X10(3) UL (ref 4–11)

## 2019-02-27 PROCEDURE — 83036 HEMOGLOBIN GLYCOSYLATED A1C: CPT

## 2019-02-27 PROCEDURE — 85025 COMPLETE CBC W/AUTO DIFF WBC: CPT

## 2019-02-27 PROCEDURE — 84443 ASSAY THYROID STIM HORMONE: CPT

## 2019-02-27 PROCEDURE — 84460 ALANINE AMINO (ALT) (SGPT): CPT

## 2019-02-27 PROCEDURE — 80061 LIPID PANEL: CPT

## 2019-02-27 PROCEDURE — 36415 COLL VENOUS BLD VENIPUNCTURE: CPT

## 2019-02-27 PROCEDURE — 84450 TRANSFERASE (AST) (SGOT): CPT

## 2019-02-27 PROCEDURE — 80048 BASIC METABOLIC PNL TOTAL CA: CPT

## 2019-02-27 PROCEDURE — 82607 VITAMIN B-12: CPT

## 2019-02-28 PROBLEM — R63.5 WEIGHT GAIN: Status: ACTIVE | Noted: 2019-02-28

## 2019-02-28 NOTE — PROGRESS NOTES
[address], [address] [facility] [address]  Dept: [phone]     Date:   2017    Patient:  Amy Lee  :      1975  MRN:      [id number]    Chief Complaint: Albuterol Sulfate HFA (PROAIR HFA) 108 (90 BASE) MCG/ACT Inhalation Aero Soln Inhale  into the lungs. Disp:  Rfl:    Multiple Vitamin (MULTIVITAMINS) Oral Cap Take  by mouth.  Disp:  Rfl:      Allergies:  Amoxicillin; Potassium; Vancomycin     Social Hist Asked        Special Diet: Not Asked        Back Care: Not Asked        Exercise: Not Asked        Bike Helmet: Not Asked        Seat Belt: Not Asked        Self-Exams: Not Asked        Grew up on a farm: Not Asked        History of tanning: Not Asked manage cues and Eat slowly and take 20 to 30 minutes to complete each meal    Exercise Goals Reviewed and Discussed    Continue to walk and do weights    ROS:    Constitutional: positive for fatigue  Respiratory: negative  Cardiovascular: negative  Dori Jay non-caloric beverages per day. No fruit juices or regular soda. 3. Increase activity-upper body exercises, walk 10 minutes per day. 4. Increase fruit and vegetable servings to 5-6 per day.       Take hctz 12.5 mg PRN    Continue phentermine    Will restar

## 2019-03-04 DIAGNOSIS — E78.00 HYPERCHOLESTEREMIA: Primary | ICD-10-CM

## 2019-03-18 ENCOUNTER — OFFICE VISIT (OUTPATIENT)
Dept: FAMILY MEDICINE CLINIC | Facility: CLINIC | Age: 44
End: 2019-03-18
Payer: MEDICAID

## 2019-03-18 VITALS
DIASTOLIC BLOOD PRESSURE: 82 MMHG | SYSTOLIC BLOOD PRESSURE: 116 MMHG | HEART RATE: 103 BPM | BODY MASS INDEX: 33.25 KG/M2 | HEIGHT: 63.5 IN | TEMPERATURE: 98 F | WEIGHT: 190 LBS

## 2019-03-18 DIAGNOSIS — F32.A ANXIETY AND DEPRESSION: ICD-10-CM

## 2019-03-18 DIAGNOSIS — J02.9 SORE THROAT: Primary | ICD-10-CM

## 2019-03-18 DIAGNOSIS — F41.9 ANXIETY AND DEPRESSION: ICD-10-CM

## 2019-03-18 DIAGNOSIS — E78.00 HYPERCHOLESTEREMIA: ICD-10-CM

## 2019-03-18 LAB
CONTROL LINE PRESENT WITH A CLEAR BACKGROUND (YES/NO): YES YES/NO
KIT LOT #: NORMAL NUMERIC
STREP GRP A CUL-SCR: NEGATIVE

## 2019-03-18 PROCEDURE — 99212 OFFICE O/P EST SF 10 MIN: CPT | Performed by: FAMILY MEDICINE

## 2019-03-18 PROCEDURE — 99214 OFFICE O/P EST MOD 30 MIN: CPT | Performed by: FAMILY MEDICINE

## 2019-03-18 PROCEDURE — 87880 STREP A ASSAY W/OPTIC: CPT | Performed by: FAMILY MEDICINE

## 2019-03-18 NOTE — PATIENT INSTRUCTIONS
Controlling Your Cholesterol  Cholesterol is a waxy substance. It travels in your blood through the blood vessels. When you have high cholesterol, it can build up along the walls of the blood vessels.  This makes the vessels narrower and decreases blood f · Choose an activity you enjoy. Walking, swimming, and riding a bike are some good ways to be active. · Start at a level where you feel comfortable. Increase your time and pace a little each week.   · Work up to 30 to 40 minutes of moderate to high intensi · Cutting back on the amount of fat and cholesterol in your meals  · Eating less salt (sodium). This is especially important if you have high blood pressure.   · Eating more fresh vegetables and fruits  · Eating lean proteins such as fish, poultry, beans, a Smoking and other tobacco use can raise cholesterol and make it harder to control. Quitting is tough. But millions of people have given up tobacco for good. You can quit, too! Think about some of the reasons below to quit smoking.  Do any of them make you t Some people may need to take medicines called statins to control their cholesterol. This is in addition to eating a healthy diet and getting regular exercise. Statins can help you stay healthy. They can also help prevent a heart attack or stroke.  You may

## 2019-03-18 NOTE — PROGRESS NOTES
HPI:   Remi Vincent is a 39year old female who presents for upper respiratory symptoms for  4  days. Patient reports sore throat, dry cough.     Saw a therapist and had initial eval  Will be going back tomorrow  Taking paroxetine 40mg daily  Doesn't fee History:   Procedure Laterality Date   • COLONOSCOPY  2017    [facility]   • TUBAL LIGATION     • UPPER GI ENDOSCOPY,EXAM  2011    EGD      Family History   Problem Relation Age of Onset   • Hypertension Mother    • Hypertension Father    • Cancer Other

## 2019-03-29 ENCOUNTER — NURSE TRIAGE (OUTPATIENT)
Dept: OTHER | Age: 44
End: 2019-03-29

## 2019-03-29 ENCOUNTER — OFFICE VISIT (OUTPATIENT)
Dept: INTERNAL MEDICINE CLINIC | Facility: CLINIC | Age: 44
End: 2019-03-29
Payer: MEDICAID

## 2019-03-29 VITALS
BODY MASS INDEX: 33 KG/M2 | WEIGHT: 189.81 LBS | SYSTOLIC BLOOD PRESSURE: 119 MMHG | DIASTOLIC BLOOD PRESSURE: 78 MMHG | HEART RATE: 103 BPM | TEMPERATURE: 98 F

## 2019-03-29 DIAGNOSIS — J06.9 VIRAL UPPER RESPIRATORY TRACT INFECTION: Primary | ICD-10-CM

## 2019-03-29 PROCEDURE — 99214 OFFICE O/P EST MOD 30 MIN: CPT | Performed by: INTERNAL MEDICINE

## 2019-03-29 PROCEDURE — 87880 STREP A ASSAY W/OPTIC: CPT | Performed by: INTERNAL MEDICINE

## 2019-03-29 PROCEDURE — 99212 OFFICE O/P EST SF 10 MIN: CPT | Performed by: INTERNAL MEDICINE

## 2019-03-29 NOTE — PROGRESS NOTES
HPI:    Patient ID: Yung Vazquez is a 39year old female. Patient presents with:  Sore Throat  Cough    HPI  Sore throat  Patient c/o sore throat, nonproductive cough, voice change, and diaphoresis. This is a new problem;  Onset on 03/27/2019, nighttime • UPPER GI ENDOSCOPY,EXAM  2011    EGD      Family History   Problem Relation Age of Onset   • Hypertension Mother    • Hypertension Father    • Cancer Other         lymphoma, lung   • Breast Cancer Other         mat great aunt age 46s      Social Histor Mouth/Throat: Uvula is midline, oropharynx is clear and moist and mucous membranes are normal. No oropharyngeal exudate. Eyes: Conjunctivae, EOM and lids are normal. Pupils are equal, round, and reactive to light.  Lids are everted and swept, no foreign Value Date    HDL 56 02/27/2019    HDL 45 02/23/2018    HDL 46 08/26/2013     Lab Results   Component Value Date     (H) 02/27/2019     (H) 02/23/2018     (H) 08/26/2013     Lab Results   Component Value Date    TRIG 90 02/27/2019    T and complete.   Nataliya Martinez MD, 4/1/2019, 7:37 AM

## 2019-03-29 NOTE — TELEPHONE ENCOUNTER
Action Requested: Summary for Provider     []  Critical Lab, Recommendations Needed  [] Need Additional Advice  []   FYI    []   Need Orders  [] Need Medications Sent to Pharmacy  []  Other     SUMMARY: Spoke with patient who reports since Wednesday 3/27/1

## 2019-04-11 ENCOUNTER — OFFICE VISIT (OUTPATIENT)
Dept: SURGERY | Facility: CLINIC | Age: 44
End: 2019-04-11
Payer: MEDICAID

## 2019-04-11 VITALS
DIASTOLIC BLOOD PRESSURE: 76 MMHG | BODY MASS INDEX: 32.02 KG/M2 | WEIGHT: 183 LBS | SYSTOLIC BLOOD PRESSURE: 104 MMHG | HEART RATE: 131 BPM | OXYGEN SATURATION: 98 % | HEIGHT: 63.5 IN | RESPIRATION RATE: 16 BRPM

## 2019-04-11 DIAGNOSIS — F50.81 BINGE EATING DISORDER: ICD-10-CM

## 2019-04-11 DIAGNOSIS — E66.9 OBESITY (BMI 30-39.9): ICD-10-CM

## 2019-04-11 DIAGNOSIS — Z51.81 ENCOUNTER FOR THERAPEUTIC DRUG MONITORING: ICD-10-CM

## 2019-04-11 DIAGNOSIS — F51.01 PRIMARY INSOMNIA: ICD-10-CM

## 2019-04-11 DIAGNOSIS — K21.9 GASTROESOPHAGEAL REFLUX DISEASE, ESOPHAGITIS PRESENCE NOT SPECIFIED: ICD-10-CM

## 2019-04-11 DIAGNOSIS — E78.00 HYPERCHOLESTEREMIA: Primary | ICD-10-CM

## 2019-04-11 PROCEDURE — 99214 OFFICE O/P EST MOD 30 MIN: CPT | Performed by: INTERNAL MEDICINE

## 2019-04-11 RX ORDER — PHENTERMINE HYDROCHLORIDE 37.5 MG/1
TABLET ORAL
Qty: 30 TABLET | Refills: 2 | Status: SHIPPED | OUTPATIENT
Start: 2019-04-11 | End: 2019-09-14

## 2019-04-11 NOTE — PROGRESS NOTES
[address]  Dept: [phone]       Patient:  Nancy Whitlock  :      1975  MRN:      [id number]    Chief Complaint:  Patient presents wi HCl 10 MG Oral Tab Take 1 tablet (10 mg total) by mouth every morning.  Take with paroxetine 30mg daily Disp: 90 tablet Rfl: 0   PAROXETINE HCL 30 MG Oral Tab TAKE 1 TABLET(30 MG) BY MOUTH DAILY Disp: 90 tablet Rfl: 1   Levothyroxine Sodium 88 MCG Oral Tab Not on file    Other Topics      Concerns:         Service: Not Asked        Blood Transfusions: Not Asked        Caffeine Concern: Yes          soda, tea, 8 oz daily        Occupational Exposure: Not Asked        Hobby Hazards: Not Asked        Sl daily    Behavior Modifications Reviewed and Discussed  Eat breakfast, Eat 3 meals per day, Plan meals in advance, Read nutrition labels, Drink 64 oz of water per day, Maintain a daily food journal, No drinking 30 minutes before or after meals, Utlize port eating: improved with phentermine      GERD: The patient believes her reflux is somewhat better of at least no worse on current medication. She was encouraged to avoid caffeine products, elevated head of bed and not eat for 1 hour before bedtime.  No interv

## 2019-04-20 RX ORDER — PAROXETINE 10 MG/1
TABLET, FILM COATED ORAL
Qty: 90 TABLET | Refills: 1 | Status: SHIPPED | OUTPATIENT
Start: 2019-04-20 | End: 2019-06-17

## 2019-04-20 NOTE — TELEPHONE ENCOUNTER
Last visit patient stated the medication was not working. She was referred to behavioral health.   Has she seen a psychiatrist?  If she is currently taking medication what dose she is taking 50??

## 2019-04-21 NOTE — TELEPHONE ENCOUNTER
Refill passed per [facility], [facility] protocol.   Refill Protocol Appointment Criteria  · Appointment scheduled in the past 6 months or in the next 3 months  Recent Outpatient Visits            1 week ago [address] [facility], [place]

## 2019-04-23 RX ORDER — PAROXETINE 30 MG/1
30 TABLET, FILM COATED ORAL EVERY MORNING
Qty: 30 TABLET | Refills: 0 | Status: SHIPPED | OUTPATIENT
Start: 2019-04-23 | End: 2019-05-19

## 2019-04-23 NOTE — TELEPHONE ENCOUNTER
Patient contacted.  Patient will be seeing Dr Contreras Masterson, [facility] Department next month and will have future rx from Dr Chichi Garza    Patient is on paroxetine 40mg per day, please see pending rx for paroxetine 30mg, paroxetine 10mg was filled 4/20/19

## 2019-05-20 RX ORDER — PAROXETINE 30 MG/1
TABLET, FILM COATED ORAL
Qty: 90 TABLET | Refills: 1 | Status: SHIPPED | OUTPATIENT
Start: 2019-05-20 | End: 2019-12-05

## 2019-06-17 ENCOUNTER — LAB ENCOUNTER (OUTPATIENT)
Dept: LAB | Age: 44
End: 2019-06-17
Attending: FAMILY MEDICINE
Payer: MEDICAID

## 2019-06-17 ENCOUNTER — OFFICE VISIT (OUTPATIENT)
Dept: FAMILY MEDICINE CLINIC | Facility: CLINIC | Age: 44
End: 2019-06-17
Payer: MEDICAID

## 2019-06-17 VITALS
TEMPERATURE: 98 F | HEIGHT: 63.5 IN | HEART RATE: 97 BPM | SYSTOLIC BLOOD PRESSURE: 115 MMHG | WEIGHT: 191 LBS | BODY MASS INDEX: 33.42 KG/M2 | DIASTOLIC BLOOD PRESSURE: 79 MMHG

## 2019-06-17 DIAGNOSIS — E78.00 HYPERCHOLESTEREMIA: ICD-10-CM

## 2019-06-17 DIAGNOSIS — R40.0 DAYTIME SOMNOLENCE: ICD-10-CM

## 2019-06-17 DIAGNOSIS — R61 SWEATING PROFUSELY: ICD-10-CM

## 2019-06-17 DIAGNOSIS — F41.9 ANXIETY AND DEPRESSION: ICD-10-CM

## 2019-06-17 DIAGNOSIS — F32.A ANXIETY AND DEPRESSION: ICD-10-CM

## 2019-06-17 DIAGNOSIS — R06.83 SNORING: ICD-10-CM

## 2019-06-17 DIAGNOSIS — E66.9 OBESITY (BMI 30-39.9): ICD-10-CM

## 2019-06-17 DIAGNOSIS — R61 SWEATING PROFUSELY: Primary | ICD-10-CM

## 2019-06-17 PROCEDURE — 80076 HEPATIC FUNCTION PANEL: CPT

## 2019-06-17 PROCEDURE — 85025 COMPLETE CBC W/AUTO DIFF WBC: CPT

## 2019-06-17 PROCEDURE — 36415 COLL VENOUS BLD VENIPUNCTURE: CPT

## 2019-06-17 PROCEDURE — 99214 OFFICE O/P EST MOD 30 MIN: CPT | Performed by: FAMILY MEDICINE

## 2019-06-17 PROCEDURE — 80061 LIPID PANEL: CPT

## 2019-06-17 PROCEDURE — 99212 OFFICE O/P EST SF 10 MIN: CPT | Performed by: FAMILY MEDICINE

## 2019-06-17 PROCEDURE — 84443 ASSAY THYROID STIM HORMONE: CPT

## 2019-06-17 NOTE — PROGRESS NOTES
HPI:    Patient ID: Yee Anglin is a 39year old female.     HPI  Patient presents with:  Medication Follow-Up  Medication Request: albuterol inhaler   Other: sweating after getting out of the shower has noticed it also a few times during the night 25 MG Oral Tab Take 1 tablet (25 mg total) by mouth every evening. Disp: 30 tablet Rfl: 2   hydrochlorothiazide 12.5 MG Oral Cap Take 1 capsule (12.5 mg total) by mouth daily.  Disp: 30 capsule Rfl: 1   Pantoprazole Sodium 40 MG Oral Tab EC Take 1 tablet (4 and fruit intake. Recommending avoiding foods high in fat content. Recommend exercising at least 30-40 minutes 5-6 days a week. Avoid skipping meals. Making healthy choices for snacks and also limiting sugary beverages.       -  [facility] REFERRAL DIAGO

## 2019-06-20 DIAGNOSIS — R74.8 ELEVATED ALKALINE PHOSPHATASE LEVEL: Primary | ICD-10-CM

## 2019-06-24 ENCOUNTER — TELEPHONE (OUTPATIENT)
Dept: CASE MANAGEMENT | Age: 44
End: 2019-06-24

## 2019-06-24 NOTE — TELEPHONE ENCOUNTER
Called pt and informed her of message below, I also went over her blood results and provided her with the phone number for her liver ultrasound

## 2019-06-24 NOTE — TELEPHONE ENCOUNTER
Dr. Joanne Chung,    The diagnostic sleep study you ordered for [place] has been denied by her insurance company. Patient has been notified and advised to f/u with you for her future plan of care.     Thank you  Lissy Gibson

## 2019-07-01 ENCOUNTER — TELEPHONE (OUTPATIENT)
Dept: INTERNAL MEDICINE CLINIC | Facility: CLINIC | Age: 44
End: 2019-07-01

## 2019-07-01 DIAGNOSIS — R74.8 ELEVATED ALKALINE PHOSPHATASE LEVEL: Primary | ICD-10-CM

## 2019-07-01 DIAGNOSIS — E66.9 OBESITY (BMI 30-39.9): ICD-10-CM

## 2019-07-01 DIAGNOSIS — R53.83 FATIGUE, UNSPECIFIED TYPE: ICD-10-CM

## 2019-07-01 DIAGNOSIS — R06.83 SNORING: ICD-10-CM

## 2019-07-01 NOTE — TELEPHONE ENCOUNTER
Referral placed for home sleep study.   Patient to follow-up with pulmonary/sleep clinic after study to discuss results

## 2019-07-01 NOTE — TELEPHONE ENCOUNTER
I did receive notification regarding denial of sleep study. Patient may qualify her for home sleep study and I can order this. Please inform patient. Liver ultrasound was denied.   Given that she has had fluctuating levels of liver enzymes I would josse

## 2019-07-01 NOTE — TELEPHONE ENCOUNTER
Dr Alisson Hadley  The patient called to state that her insurance will not cover the sleep study or the US Liver.  Her US is still scheduled for 7/5/19  Please respond to pool: EM Nava 61 LPN/CMA

## 2019-07-01 NOTE — TELEPHONE ENCOUNTER
Patient was informed with understanding. Phone number given for GI. The patient stated she will accept the order for the home sleep study. That is what she was told she would have to do.

## 2019-07-01 NOTE — TELEPHONE ENCOUNTER
The patient was informed and will schedule the test.  Phone number given for pulmonology and manage care to check on status if the referrals.

## 2019-07-03 ENCOUNTER — TELEPHONE (OUTPATIENT)
Dept: FAMILY MEDICINE CLINIC | Facility: CLINIC | Age: 44
End: 2019-07-03

## 2019-07-03 NOTE — TELEPHONE ENCOUNTER
The US LIVER has been denied by the health plan. A denial letter has been faxed to you and your clinical staffs attn. Please reference letter for a [id number]. Also please follow up with patient for plan of care.      THANKS, SARA

## 2019-08-15 ENCOUNTER — HOSPITAL ENCOUNTER (EMERGENCY)
Facility: HOSPITAL | Age: 44
Discharge: HOME | End: 2019-08-15
Payer: MEDICAID

## 2019-08-15 ENCOUNTER — APPOINTMENT (OUTPATIENT)
Dept: GENERAL RADIOLOGY | Facility: HOSPITAL | Age: 44
End: 2019-08-15
Attending: NURSE PRACTITIONER
Payer: MEDICAID

## 2019-08-15 VITALS
TEMPERATURE: 98 F | BODY MASS INDEX: 33.66 KG/M2 | OXYGEN SATURATION: 99 % | DIASTOLIC BLOOD PRESSURE: 77 MMHG | SYSTOLIC BLOOD PRESSURE: 117 MMHG | HEIGHT: 63 IN | WEIGHT: 190 LBS | HEART RATE: 78 BPM | RESPIRATION RATE: 15 BRPM

## 2019-08-15 DIAGNOSIS — R07.89 CHEST PAIN, NON-CARDIAC: ICD-10-CM

## 2019-08-15 DIAGNOSIS — N30.00 ACUTE CYSTITIS WITHOUT HEMATURIA: Primary | ICD-10-CM

## 2019-08-15 LAB
ALBUMIN SERPL-MCNC: 3.9 G/DL (ref 3.4–5)
ALP LIVER SERPL-CCNC: 117 U/L (ref 37–98)
ALT SERPL-CCNC: 26 U/L (ref 13–56)
ANION GAP SERPL CALC-SCNC: 10 MMOL/L (ref 0–18)
AST SERPL-CCNC: 13 U/L (ref 15–37)
BASOPHILS # BLD AUTO: 0.05 X10(3) UL (ref 0–0.2)
BASOPHILS NFR BLD AUTO: 0.6 %
BILIRUB DIRECT SERPL-MCNC: <0.1 MG/DL (ref 0–0.2)
BILIRUB SERPL-MCNC: 0.4 MG/DL (ref 0.1–2)
BILIRUB UR QL: NEGATIVE
BUN BLD-MCNC: 15 MG/DL (ref 7–18)
BUN/CREAT SERPL: 17.6 (ref 10–20)
CALCIUM BLD-MCNC: 9.3 MG/DL (ref 8.5–10.1)
CHLORIDE SERPL-SCNC: 104 MMOL/L (ref 98–112)
CO2 SERPL-SCNC: 27 MMOL/L (ref 21–32)
COLOR UR: YELLOW
CREAT BLD-MCNC: 0.85 MG/DL (ref 0.55–1.02)
D DIMER PPP FEU-MCNC: 0.31 UG/ML FEU (ref ?–0.5)
DEPRECATED RDW RBC AUTO: 40.1 FL (ref 35.1–46.3)
EOSINOPHIL # BLD AUTO: 0.08 X10(3) UL (ref 0–0.7)
EOSINOPHIL NFR BLD AUTO: 0.9 %
ERYTHROCYTE [DISTWIDTH] IN BLOOD BY AUTOMATED COUNT: 12.5 % (ref 11–15)
GLUCOSE BLD-MCNC: 94 MG/DL (ref 70–99)
GLUCOSE UR-MCNC: NEGATIVE MG/DL
HCT VFR BLD AUTO: 41 % (ref 35–48)
HGB BLD-MCNC: 14 G/DL (ref 12–16)
IMM GRANULOCYTES # BLD AUTO: 0.02 X10(3) UL (ref 0–1)
IMM GRANULOCYTES NFR BLD: 0.2 %
KETONES UR-MCNC: NEGATIVE MG/DL
LIPASE SERPL-CCNC: 139 U/L (ref 73–393)
LYMPHOCYTES # BLD AUTO: 2.18 X10(3) UL (ref 1–4)
LYMPHOCYTES NFR BLD AUTO: 24.1 %
M PROTEIN MFR SERPL ELPH: 8.2 G/DL (ref 6.4–8.2)
MCH RBC QN AUTO: 29.9 PG (ref 26–34)
MCHC RBC AUTO-ENTMCNC: 34.1 G/DL (ref 31–37)
MCV RBC AUTO: 87.4 FL (ref 80–100)
MONOCYTES # BLD AUTO: 0.74 X10(3) UL (ref 0.1–1)
MONOCYTES NFR BLD AUTO: 8.2 %
NEUTROPHILS # BLD AUTO: 5.96 X10 (3) UL (ref 1.5–7.7)
NEUTROPHILS # BLD AUTO: 5.96 X10(3) UL (ref 1.5–7.7)
NEUTROPHILS NFR BLD AUTO: 66 %
NITRITE UR QL STRIP.AUTO: NEGATIVE
OSMOLALITY SERPL CALC.SUM OF ELEC: 293 MOSM/KG (ref 275–295)
PH UR: 6 [PH] (ref 5–8)
PLATELET # BLD AUTO: 415 10(3)UL (ref 150–450)
POTASSIUM SERPL-SCNC: 3.6 MMOL/L (ref 3.5–5.1)
PROT UR-MCNC: NEGATIVE MG/DL
RBC # BLD AUTO: 4.69 X10(6)UL (ref 3.8–5.3)
RBC #/AREA URNS AUTO: 6 /HPF
SODIUM SERPL-SCNC: 141 MMOL/L (ref 136–145)
SP GR UR STRIP: 1.02 (ref 1–1.03)
TROPONIN I SERPL-MCNC: <0.045 NG/ML (ref ?–0.04)
UROBILINOGEN UR STRIP-ACNC: <2
VIT C UR-MCNC: NEGATIVE MG/DL
WBC # BLD AUTO: 9 X10(3) UL (ref 4–11)
WBC #/AREA URNS AUTO: 7 /HPF

## 2019-08-15 PROCEDURE — 93005 ELECTROCARDIOGRAM TRACING: CPT

## 2019-08-15 PROCEDURE — 71046 X-RAY EXAM CHEST 2 VIEWS: CPT | Performed by: NURSE PRACTITIONER

## 2019-08-15 PROCEDURE — 81001 URINALYSIS AUTO W/SCOPE: CPT | Performed by: NURSE PRACTITIONER

## 2019-08-15 PROCEDURE — 80048 BASIC METABOLIC PNL TOTAL CA: CPT | Performed by: NURSE PRACTITIONER

## 2019-08-15 PROCEDURE — 85025 COMPLETE CBC W/AUTO DIFF WBC: CPT | Performed by: NURSE PRACTITIONER

## 2019-08-15 PROCEDURE — 99285 EMERGENCY DEPT VISIT HI MDM: CPT

## 2019-08-15 PROCEDURE — 80076 HEPATIC FUNCTION PANEL: CPT | Performed by: NURSE PRACTITIONER

## 2019-08-15 PROCEDURE — 87086 URINE CULTURE/COLONY COUNT: CPT | Performed by: NURSE PRACTITIONER

## 2019-08-15 PROCEDURE — 83690 ASSAY OF LIPASE: CPT | Performed by: NURSE PRACTITIONER

## 2019-08-15 PROCEDURE — 93010 ELECTROCARDIOGRAM REPORT: CPT | Performed by: INTERNAL MEDICINE

## 2019-08-15 PROCEDURE — 96360 HYDRATION IV INFUSION INIT: CPT

## 2019-08-15 PROCEDURE — 85379 FIBRIN DEGRADATION QUANT: CPT | Performed by: NURSE PRACTITIONER

## 2019-08-15 PROCEDURE — 84484 ASSAY OF TROPONIN QUANT: CPT | Performed by: NURSE PRACTITIONER

## 2019-08-15 RX ORDER — CEPHALEXIN 500 MG/1
500 CAPSULE ORAL 2 TIMES DAILY
Qty: 10 CAPSULE | Refills: 0 | Status: SHIPPED | OUTPATIENT
Start: 2019-08-15 | End: 2019-08-20

## 2019-08-15 RX ORDER — ONDANSETRON 4 MG/1
4 TABLET, FILM COATED ORAL EVERY 6 HOURS PRN
Qty: 12 TABLET | Refills: 0 | Status: SHIPPED | OUTPATIENT
Start: 2019-08-15 | End: 2019-08-20

## 2019-08-15 NOTE — ED INITIAL ASSESSMENT (HPI)
Pt presents to ED with multiple complaints.  Pt states starting Tuesday she has been having what feels like acid reflux w/ increasing chest pain + n/v. Pt also c/o dizziness

## 2019-08-15 NOTE — ED NOTES
Patient presents to ER with c/o left sided chest pain and n/v. Patient denies recent fever or travel. Speaking in full sentences.  No respiratory distress noted

## 2019-08-15 NOTE — ED PROVIDER NOTES
Patient Seen in: [facility] AND [facility] Emergency Department    History   CC: cp  HPI: Remi Vincent 39year old female w/ hx GERD, IBS, Depression, Asthma, Anxiety who presents to the ER c/o left sided cp that is constant in nature over the past few days w reviewed from today and agreed except as otherwise stated in HPI. Medications :   cephALEXin 500 MG Oral Cap,  Take 1 capsule (500 mg total) by mouth 2 (two) times daily for 5 days.    Ondansetron HCl (ZOFRAN) 4 mg tablet,  Take 1 tablet (4 mg total) by with trachea midline  Resp - Lung sounds clear bilaterally and wob unlabored, good aeration with equal, even expansion bilaterally   CV - RRR  GI - Appears round and flat, BS +x4 quadrants, +epigastric and LUQ abd tenderness w/o guarding with palpation   PROCEDURE:  XR CHEST PA + LAT CHEST (CPT=71020)     COMPARISON: [facility], XR CHEST PA + LAT CHEST (CPT=71020), 1/29/2017, 6:40. [facility], XR CHEST AP PORTABLE (CPT=71010), 10/25/2017, 8:30.      INDICATIONS:  Left side tablet (4 mg total) by mouth every 6 (six) hours as needed for Nausea.   Qty: 12 tablet Refills: 0

## 2019-08-26 ENCOUNTER — OFFICE VISIT (OUTPATIENT)
Dept: SLEEP CENTER | Age: 44
End: 2019-08-26
Attending: FAMILY MEDICINE
Payer: MEDICAID

## 2019-08-26 DIAGNOSIS — R53.83 FATIGUE, UNSPECIFIED TYPE: ICD-10-CM

## 2019-08-26 DIAGNOSIS — R06.83 SNORING: ICD-10-CM

## 2019-08-26 DIAGNOSIS — E66.9 OBESITY (BMI 30-39.9): ICD-10-CM

## 2019-08-26 PROCEDURE — 95806 SLEEP STUDY UNATT&RESP EFFT: CPT

## 2019-08-27 ENCOUNTER — OFFICE VISIT (OUTPATIENT)
Dept: FAMILY MEDICINE CLINIC | Facility: CLINIC | Age: 44
End: 2019-08-27
Payer: MEDICAID

## 2019-08-27 VITALS
BODY MASS INDEX: 34.3 KG/M2 | TEMPERATURE: 99 F | DIASTOLIC BLOOD PRESSURE: 77 MMHG | HEART RATE: 88 BPM | WEIGHT: 196 LBS | HEIGHT: 63.5 IN | SYSTOLIC BLOOD PRESSURE: 111 MMHG

## 2019-08-27 DIAGNOSIS — M94.0 COSTOCHONDRITIS: ICD-10-CM

## 2019-08-27 DIAGNOSIS — N64.4 BREAST PAIN, LEFT: ICD-10-CM

## 2019-08-27 DIAGNOSIS — R07.9 LEFT-SIDED CHEST PAIN: Primary | ICD-10-CM

## 2019-08-27 PROCEDURE — 99214 OFFICE O/P EST MOD 30 MIN: CPT | Performed by: FAMILY MEDICINE

## 2019-08-27 RX ORDER — PAROXETINE 30 MG/1
TABLET, FILM COATED ORAL
COMMUNITY
End: 2019-08-27

## 2019-08-27 NOTE — PROGRESS NOTES
HPI:    Patient ID: Jg Payne is a 39year old female.     HPI  Patient presents with:  ER F/U: went to [facility] on 8-14-19 for chest pain feeling a little better but pain is not gone completly   Medication Request: pantaprozole    Left sided tobacco: Never Used    Alcohol use: Yes      Comment: socially     Drug use: No        ROS:  Review of Systems     Positive for stated complaint: cp and vomiting  Other systems are as noted in HPI.   Constitutional and vital signs reviewed.       All other edema  ENT - EAC bilaterally without discharge, TM pearly grey with COL visualized appropriately bilaterally. no nasal drainage noted in nares bilat, no cobblestoning to post. Pharynx.    Oropharynx clear, posterior pharynx is without erythema and without    Please view results for these tests on the individual orders.    RAINBOW DRAW BLUE   RAINBOW DRAW LAVENDER   RAINBOW DRAW LIGHT GREEN   RAINBOW DRAW GOLD   URINE CULTURE, ROUTINE   CBC W/ DIFFERENTIAL      EKG     Rate, intervals and axes as noted on E Respiratory: Negative for cough, chest tightness and shortness of breath. Cardiovascular: Positive for chest pain. Negative for palpitations and leg swelling.        /77   Pulse 88   Temp 98.6 °F (37 °C) (Oral)   Ht 5' 3.5\" (1.613 m)   Wt 196 lb ASSESSMENT/PLAN:   Left-sided chest pain  (primary encounter diagnosis)  Breast pain, left  Costochondritis    1. Left-sided chest pain  Reviewed ER notes  Suspect muscular  Tylenol as needed  Follow up if persists/ sob  2.  Breast pain, left    - KELSI GURMEET

## 2019-08-28 NOTE — PROCEDURES
[address] [place] [address]  Accredited by the [facility] ([facility])    PATIENT'S NAME: Layne Grimes   ATTENDING PHYSICIAN: Madonna Rooney MD   REFERRING PHYSICIAN: Madonna Rooney MD   PATIENT ACCOUNT #: [id number] LOCATION: Sleep Center    CPAP titration in this patient who has mild MATILDE but has significant clinical syndrome including pathological somnolence and occasional drowsy driving. 2.   Weight loss. 3.   Avoid alcohol. 4.   Avoid sedating drug.   5.   The patient should not drive if

## 2019-09-11 NOTE — PROGRESS NOTES
[facility], [facility] - Gastroenterology                                                                                                  Clinic Progress Note    Patient pr aureus) 2012   • Obesity (BMI 30-39. 9)    • PMDD (premenstrual dysphoric disorder) 2013    Zoloft   • Thyroid disease    • Vertigo       Past Surgical History:   Procedure Laterality Date   • COLONOSCOPY  2017    [facility]   • TUBAL LIGATION     • UPPER GI END pharnyx is without exudates or lesions  Neck: no lymphadenopathy; thyroid is not enlarged and without nodules  CV: RRR  Resp: non-labored breathing  Abd: soft, non-tender, non-distended  Ext: no lower extremity swelling  Neuro: Alert, Oriented X 3  Skin: n Prescriptions      No prescriptions requested or ordered in this encounter     Imaging & Referrals:  None     Shorty Stanley MD  [facility], [facility] - Gastroenterology  9/13/2019

## 2019-09-13 ENCOUNTER — OFFICE VISIT (OUTPATIENT)
Dept: GASTROENTEROLOGY | Facility: CLINIC | Age: 44
End: 2019-09-13
Payer: MEDICAID

## 2019-09-13 VITALS
DIASTOLIC BLOOD PRESSURE: 74 MMHG | WEIGHT: 198 LBS | HEART RATE: 94 BPM | BODY MASS INDEX: 34.65 KG/M2 | HEIGHT: 63.5 IN | SYSTOLIC BLOOD PRESSURE: 107 MMHG

## 2019-09-13 DIAGNOSIS — R74.8 ALKALINE PHOSPHATASE ELEVATION: ICD-10-CM

## 2019-09-13 DIAGNOSIS — K59.00 CONSTIPATION, UNSPECIFIED CONSTIPATION TYPE: Primary | ICD-10-CM

## 2019-09-13 DIAGNOSIS — K21.9 GASTROESOPHAGEAL REFLUX DISEASE, ESOPHAGITIS PRESENCE NOT SPECIFIED: ICD-10-CM

## 2019-09-13 PROCEDURE — 99214 OFFICE O/P EST MOD 30 MIN: CPT | Performed by: INTERNAL MEDICINE

## 2019-09-13 NOTE — PATIENT INSTRUCTIONS
1. Get your blood tests after fasting 8 hours to check your elevated alkaline phosphatase level    2. Schedule your pelvic floor physical therapy    3. Try trulance. We may need to obtain prior authorization    4.  Take your omeprazole 30 minutes before din

## 2019-09-14 ENCOUNTER — LAB ENCOUNTER (OUTPATIENT)
Dept: LAB | Facility: HOSPITAL | Age: 44
End: 2019-09-14
Attending: OBSTETRICS & GYNECOLOGY
Payer: MEDICAID

## 2019-09-14 ENCOUNTER — OFFICE VISIT (OUTPATIENT)
Dept: OBGYN CLINIC | Facility: CLINIC | Age: 44
End: 2019-09-14
Payer: MEDICAID

## 2019-09-14 VITALS
DIASTOLIC BLOOD PRESSURE: 86 MMHG | WEIGHT: 196.81 LBS | BODY MASS INDEX: 34.44 KG/M2 | HEART RATE: 87 BPM | SYSTOLIC BLOOD PRESSURE: 121 MMHG | HEIGHT: 63.5 IN

## 2019-09-14 DIAGNOSIS — Z11.3 SCREEN FOR STD (SEXUALLY TRANSMITTED DISEASE): ICD-10-CM

## 2019-09-14 DIAGNOSIS — Z01.419 ENCOUNTER FOR GYNECOLOGICAL EXAMINATION: Primary | ICD-10-CM

## 2019-09-14 DIAGNOSIS — R74.8 ALKALINE PHOSPHATASE ELEVATION: ICD-10-CM

## 2019-09-14 LAB
ALBUMIN SERPL-MCNC: 3.9 G/DL (ref 3.4–5)
ALP LIVER SERPL-CCNC: 119 U/L (ref 37–98)
ALT SERPL-CCNC: 35 U/L (ref 13–56)
AST SERPL-CCNC: 10 U/L (ref 15–37)
BILIRUB DIRECT SERPL-MCNC: <0.1 MG/DL (ref 0–0.2)
BILIRUB SERPL-MCNC: 0.5 MG/DL (ref 0.1–2)
GGT SERPL-CCNC: 119 U/L (ref 5–55)
HBV SURFACE AG SER-ACNC: <0.1 [IU]/L
HBV SURFACE AG SERPL QL IA: NONREACTIVE
HCV AB SERPL QL IA: NONREACTIVE
M PROTEIN MFR SERPL ELPH: 7.9 G/DL (ref 6.4–8.2)

## 2019-09-14 PROCEDURE — 87340 HEPATITIS B SURFACE AG IA: CPT

## 2019-09-14 PROCEDURE — 86780 TREPONEMA PALLIDUM: CPT

## 2019-09-14 PROCEDURE — 80076 HEPATIC FUNCTION PANEL: CPT

## 2019-09-14 PROCEDURE — 82977 ASSAY OF GGT: CPT

## 2019-09-14 PROCEDURE — 87389 HIV-1 AG W/HIV-1&-2 AB AG IA: CPT

## 2019-09-14 PROCEDURE — 36415 COLL VENOUS BLD VENIPUNCTURE: CPT

## 2019-09-14 PROCEDURE — 99396 PREV VISIT EST AGE 40-64: CPT | Performed by: OBSTETRICS & GYNECOLOGY

## 2019-09-14 PROCEDURE — 86803 HEPATITIS C AB TEST: CPT

## 2019-09-14 NOTE — PROGRESS NOTES
Yung Vazquez is a 39year old female  No LMP recorded. (Menstrual status: IUD - Intrauterine Device). here for annual exam.       Last seen 18. Last pap 2018 normal with neg HPV. Had Mirena IUD placed elsewhere 3/2017.    In last 5 mon tablet Rfl: 2   PAROXETINE HCL 30 MG Oral Tab TAKE 1 TABLET BY MOUTH EVERY MORNING.  TAKE WITH 10 MG DAILY Disp: 90 tablet Rfl: 1   Levothyroxine Sodium 88 MCG Oral Tab TK 1 T PO D Disp:  Rfl: 2   MIRENA, 52 MG, 20 MCG/24HR Intrauterine IUD  Disp:  Rfl: 0 in size, location, without lesions and prolapse  Bladder:  No fullness, masses or tenderness  Vagina:  Normal appearance without lesions, no abnormal discharge  Cervix:  Normal without tenderness on motion. IUD strings seen.     Uterus: normal in size, con

## 2019-09-15 LAB
C TRACH DNA SPEC QL NAA+PROBE: NEGATIVE
N GONORRHOEA DNA SPEC QL NAA+PROBE: NEGATIVE

## 2019-09-16 LAB
T PALLIDUM AB SER QL: NEGATIVE
T VAGINALIS RRNA SPEC QL NAA+PROBE: NEGATIVE

## 2019-10-02 ENCOUNTER — HOSPITAL ENCOUNTER (OUTPATIENT)
Dept: MAMMOGRAPHY | Facility: HOSPITAL | Age: 44
Discharge: HOME | End: 2019-10-02
Attending: FAMILY MEDICINE
Payer: MEDICAID

## 2019-10-02 DIAGNOSIS — N64.4 BREAST PAIN, LEFT: ICD-10-CM

## 2019-10-02 PROCEDURE — 77066 DX MAMMO INCL CAD BI: CPT | Performed by: FAMILY MEDICINE

## 2019-10-02 PROCEDURE — 77062 BREAST TOMOSYNTHESIS BI: CPT | Performed by: FAMILY MEDICINE

## 2019-10-09 ENCOUNTER — APPOINTMENT (OUTPATIENT)
Dept: LAB | Facility: HOSPITAL | Age: 44
End: 2019-10-09
Attending: INTERNAL MEDICINE
Payer: MEDICAID

## 2019-10-09 DIAGNOSIS — R74.8 ALKALINE PHOSPHATASE ELEVATION: ICD-10-CM

## 2019-10-09 DIAGNOSIS — R74.8 ELEVATED SERUM GGT LEVEL: ICD-10-CM

## 2019-10-09 PROCEDURE — 36415 COLL VENOUS BLD VENIPUNCTURE: CPT

## 2019-10-09 PROCEDURE — 86255 FLUORESCENT ANTIBODY SCREEN: CPT

## 2019-10-10 ENCOUNTER — HOSPITAL ENCOUNTER (OUTPATIENT)
Dept: ULTRASOUND IMAGING | Age: 44
Discharge: HOME | End: 2019-10-10
Attending: INTERNAL MEDICINE
Payer: MEDICAID

## 2019-10-10 DIAGNOSIS — R74.8 ELEVATED SERUM GGT LEVEL: ICD-10-CM

## 2019-10-10 DIAGNOSIS — R74.8 ALKALINE PHOSPHATASE ELEVATION: ICD-10-CM

## 2019-10-10 PROCEDURE — 76705 ECHO EXAM OF ABDOMEN: CPT | Performed by: INTERNAL MEDICINE

## 2019-10-11 DIAGNOSIS — R74.8 ELEVATED LIVER ENZYMES: Primary | ICD-10-CM

## 2019-10-23 ENCOUNTER — TELEPHONE (OUTPATIENT)
Dept: GASTROENTEROLOGY | Facility: CLINIC | Age: 44
End: 2019-10-23

## 2019-10-23 NOTE — TELEPHONE ENCOUNTER
Received fax from [place] stating that PA was needed for Trulance. Medication PA Requested: Trulance 3 mg  Pt insurance/number to contact: [facility]/ [employer]  CoverMyMeds Used:  Yes  Key: [id number]  Insurance ID# and group: [id number]  Dx

## 2019-11-04 ENCOUNTER — TELEPHONE (OUTPATIENT)
Dept: GASTROENTEROLOGY | Facility: CLINIC | Age: 44
End: 2019-11-04

## 2019-11-04 NOTE — TELEPHONE ENCOUNTER
Written by Kinga Wise MD on 10/11/2019  3:42 PM   \"Hi Ms. Christiano Goldsmith liver looks unremarkable on the ultrasound as does your bile duct and gallbladder. The blood test looking for a condition called PBC was normal also.  At this point, I would

## 2019-11-04 NOTE — TELEPHONE ENCOUNTER
Pt contacted and reviewed Dr. Jayshree Rodriguez results message below. She verbalized understanding of all and informed her that she is due for repeat HFP on/after 4/11/2020.      Copy of order mailed to pt home address as a reminder to repeat as per her request. Meenakshi woody

## 2019-11-29 ENCOUNTER — TELEPHONE (OUTPATIENT)
Dept: PHYSICAL THERAPY | Facility: HOSPITAL | Age: 44
End: 2019-11-29

## 2019-12-02 ENCOUNTER — APPOINTMENT (OUTPATIENT)
Dept: PHYSICAL THERAPY | Age: 44
End: 2019-12-02
Attending: INTERNAL MEDICINE
Payer: MEDICAID

## 2019-12-06 RX ORDER — PAROXETINE 30 MG/1
TABLET, FILM COATED ORAL
Qty: 90 TABLET | Refills: 0 | Status: SHIPPED | OUTPATIENT
Start: 2019-12-06 | End: 2020-03-13

## 2019-12-06 NOTE — TELEPHONE ENCOUNTER
Message noted: Chart reviewed and may refill medication times one 90 day supply as requested with no additional refill. Prescription sent to listed pharmacy. Pharmacy to notify patient.

## 2019-12-09 ENCOUNTER — APPOINTMENT (OUTPATIENT)
Dept: PHYSICAL THERAPY | Age: 44
End: 2019-12-09
Attending: INTERNAL MEDICINE
Payer: MEDICAID

## 2019-12-16 ENCOUNTER — APPOINTMENT (OUTPATIENT)
Dept: PHYSICAL THERAPY | Age: 44
End: 2019-12-16
Attending: INTERNAL MEDICINE
Payer: MEDICAID

## 2019-12-23 ENCOUNTER — APPOINTMENT (OUTPATIENT)
Dept: PHYSICAL THERAPY | Age: 44
End: 2019-12-23
Attending: INTERNAL MEDICINE
Payer: MEDICAID

## 2019-12-30 ENCOUNTER — APPOINTMENT (OUTPATIENT)
Dept: PHYSICAL THERAPY | Age: 44
End: 2019-12-30
Attending: INTERNAL MEDICINE
Payer: MEDICAID

## 2020-01-06 ENCOUNTER — APPOINTMENT (OUTPATIENT)
Dept: PHYSICAL THERAPY | Age: 45
End: 2020-01-06
Attending: INTERNAL MEDICINE
Payer: MEDICAID

## 2020-01-09 ENCOUNTER — OFFICE VISIT (OUTPATIENT)
Dept: FAMILY MEDICINE CLINIC | Facility: CLINIC | Age: 45
End: 2020-01-09
Payer: MEDICAID

## 2020-01-09 VITALS
DIASTOLIC BLOOD PRESSURE: 86 MMHG | BODY MASS INDEX: 37.1 KG/M2 | WEIGHT: 212 LBS | TEMPERATURE: 98 F | HEIGHT: 63.5 IN | SYSTOLIC BLOOD PRESSURE: 120 MMHG | HEART RATE: 87 BPM

## 2020-01-09 DIAGNOSIS — R21 RASH: Primary | ICD-10-CM

## 2020-01-09 PROCEDURE — 99213 OFFICE O/P EST LOW 20 MIN: CPT | Performed by: PHYSICIAN ASSISTANT

## 2020-01-09 RX ORDER — CLOTRIMAZOLE AND BETAMETHASONE DIPROPIONATE 10; .64 MG/G; MG/G
CREAM TOPICAL
Qty: 1 TUBE | Refills: 0 | Status: SHIPPED | OUTPATIENT
Start: 2020-01-09 | End: 2020-01-13

## 2020-01-09 NOTE — PROGRESS NOTES
HPI:    Patient ID: Lin Phoenix is a 38year old female. Patient presents for rash on right forearm for the past 2 weeks. Started out as 2 little pokes and have since grown. Are a bit raised. Very itchy. No pain noted. No draining noted.  No fever/ch and well-nourished. Cardiovascular: Normal rate, regular rhythm and normal heart sounds. Pulmonary/Chest: Effort normal and breath sounds normal. She has no wheezes. She has no rales.    Neurological: She is alert and oriented to person, place, and mami

## 2020-01-11 ENCOUNTER — TELEPHONE (OUTPATIENT)
Dept: FAMILY MEDICINE CLINIC | Facility: CLINIC | Age: 45
End: 2020-01-11

## 2020-01-13 ENCOUNTER — APPOINTMENT (OUTPATIENT)
Dept: PHYSICAL THERAPY | Age: 45
End: 2020-01-13
Attending: INTERNAL MEDICINE
Payer: MEDICAID

## 2020-01-13 ENCOUNTER — TELEPHONE (OUTPATIENT)
Dept: FAMILY MEDICINE CLINIC | Facility: CLINIC | Age: 45
End: 2020-01-13

## 2020-01-13 RX ORDER — BETAMETHASONE DIPROPIONATE 0.5 MG/G
CREAM TOPICAL
Qty: 15 G | Refills: 0 | Status: SHIPPED | OUTPATIENT
Start: 2020-01-13 | End: 2020-03-05 | Stop reason: ALTCHOICE

## 2020-01-13 RX ORDER — CLOTRIMAZOLE 1 %
CREAM (GRAM) TOPICAL
Qty: 12 G | Refills: 0 | Status: SHIPPED | OUTPATIENT
Start: 2020-01-13 | End: 2020-03-05 | Stop reason: ALTCHOICE

## 2020-01-13 NOTE — TELEPHONE ENCOUNTER
Dr Lamine Velez, see note below, advise on 2 scripts.        Disp Refills Start End    clotrimazole-betamethasone 1-0.05 % External Cream 1 Tube 0 1/9/2020     Sig: Apply a small dab to the affected area of the body up to twice per day

## 2020-01-13 NOTE — TELEPHONE ENCOUNTER
pharm states that pts insur will not cover New Rx for Lotrisone Cream, it will only cover 2 part cream, so New Rx is needed for Betamethasone and Clotrimazole.

## 2020-01-20 ENCOUNTER — APPOINTMENT (OUTPATIENT)
Dept: PHYSICAL THERAPY | Age: 45
End: 2020-01-20
Attending: INTERNAL MEDICINE
Payer: MEDICAID

## 2020-01-23 ENCOUNTER — TELEPHONE (OUTPATIENT)
Dept: FAMILY MEDICINE CLINIC | Facility: CLINIC | Age: 45
End: 2020-01-23

## 2020-01-23 NOTE — TELEPHONE ENCOUNTER
Spoke to Nery at [facility], patient declined medication . Medication was put back.

## 2020-01-23 NOTE — TELEPHONE ENCOUNTER
•  betamethasone dipropionate 0.05 % External Cream, Apply a small dab to the affected area of the body up to twice a day, Disp: 15 g, Rfl: 0

## 2020-02-10 ENCOUNTER — OFFICE VISIT (OUTPATIENT)
Dept: DERMATOLOGY CLINIC | Facility: CLINIC | Age: 45
End: 2020-02-10
Payer: MEDICAID

## 2020-02-10 DIAGNOSIS — L81.0 HYPERPIGMENTATION OF SKIN, POSTINFLAMMATORY: ICD-10-CM

## 2020-02-10 DIAGNOSIS — L71.0 PERIORAL DERMATITIS: Primary | ICD-10-CM

## 2020-02-10 PROCEDURE — 99213 OFFICE O/P EST LOW 20 MIN: CPT | Performed by: DERMATOLOGY

## 2020-02-10 NOTE — PROGRESS NOTES
HPI:     Chief Complaint     Rash        HPI     Rash      Additional comments: LOV 9/24/2018 Patient present with was a red itchy rash on face and arm . Patient c/o having rash for 1 month .  Patient treated rash with otc products          Last edited by Enrrique Aldridge Sodium 88 MCG Oral Tab TK 1 T PO D  2   • MIRENA, 52 MG, 20 MCG/24HR Intrauterine IUD   0   • Albuterol Sulfate HFA (PROAIR HFA) 108 (90 BASE) MCG/ACT Inhalation Aero Soln Inhale  into the lungs.      • Multiple Vitamin (MULTIVITAMINS) Oral Cap Take  by sindhu Relationships      Social connections:        Talks on phone: Not on file        Gets together: Not on file        Attends Protestant service: Not on file        Active member of club or organization: Not on file        Attends meetings of clubs or [place] chin extending up to the inferonasal area      ASSESSMENT/PLAN:   Perioral dermatitis  (primary encounter diagnosis)/Rosacea–diagnosis discussed. At this point patient is given metronidazole cream to use daily.   I have told her that she needs to use this

## 2020-02-26 ENCOUNTER — TELEPHONE (OUTPATIENT)
Dept: FAMILY MEDICINE CLINIC | Facility: CLINIC | Age: 45
End: 2020-02-26

## 2020-02-26 ENCOUNTER — OFFICE VISIT (OUTPATIENT)
Dept: SURGERY | Facility: CLINIC | Age: 45
End: 2020-02-26
Payer: MEDICAID

## 2020-02-26 VITALS
HEART RATE: 87 BPM | WEIGHT: 216 LBS | BODY MASS INDEX: 37.8 KG/M2 | DIASTOLIC BLOOD PRESSURE: 82 MMHG | OXYGEN SATURATION: 99 % | SYSTOLIC BLOOD PRESSURE: 113 MMHG | HEIGHT: 63.5 IN

## 2020-02-26 DIAGNOSIS — Z51.81 ENCOUNTER FOR THERAPEUTIC DRUG MONITORING: ICD-10-CM

## 2020-02-26 DIAGNOSIS — E66.9 OBESITY (BMI 30-39.9): ICD-10-CM

## 2020-02-26 DIAGNOSIS — E78.00 HYPERCHOLESTEREMIA: Primary | ICD-10-CM

## 2020-02-26 DIAGNOSIS — Z00.00 WELL ADULT EXAM: Primary | ICD-10-CM

## 2020-02-26 DIAGNOSIS — R63.5 WEIGHT GAIN: ICD-10-CM

## 2020-02-26 DIAGNOSIS — G47.33 OSA (OBSTRUCTIVE SLEEP APNEA): ICD-10-CM

## 2020-02-26 PROCEDURE — 99214 OFFICE O/P EST MOD 30 MIN: CPT | Performed by: INTERNAL MEDICINE

## 2020-02-26 RX ORDER — HYDROCHLOROTHIAZIDE 12.5 MG/1
12.5 CAPSULE, GELATIN COATED ORAL DAILY
Qty: 30 CAPSULE | Refills: 1 | Status: SHIPPED | OUTPATIENT
Start: 2020-02-26 | End: 2020-06-17

## 2020-02-26 RX ORDER — PHENTERMINE HYDROCHLORIDE 37.5 MG/1
TABLET ORAL
Qty: 30 TABLET | Refills: 2 | Status: SHIPPED | OUTPATIENT
Start: 2020-02-26 | End: 2020-10-21

## 2020-02-26 NOTE — PROGRESS NOTES
[address], [address]  Dept: [phone]       Patient:  Erlinda Gómez  :      1975  MRN:      [id number]    Chief Complaint:  Patient presents wi of the body up to twice a day 12 g 0   • betamethasone dipropionate 0.05 % External Cream Apply a small dab to the affected area of the body up to twice a day 15 g 0   • PAROXETINE HCL 30 MG Oral Tab TAKE 1 TABLET BY MOUTH EVERY MORNING.  TAKE WITH 10 MG DA abused: Not on file        Forced sexual activity: Not on file    Other Topics      Concerns:         Service: Not Asked        Blood Transfusions: Not Asked        Caffeine Concern: Yes          soda, tea, 8 oz daily        Occupational Exposure: and Eat 3-4 cups of fresh fruits or vegetables daily    Behavior Modifications Reviewed and Discussed  Eat breakfast, Eat 3 meals per day, Plan meals in advance, Read nutrition labels, Drink 64 oz of water per day, Maintain a daily food journal, No drinki for bariatric surgery will be discussed at upcoming clinic visits. NON SUICIDAL DEPRESSION: Mood stable on current medication. No changes initiated on this visit.       Binge eating: improved with phentermine    DYSLIPIDEMIA: Stable on the above pres

## 2020-02-26 NOTE — TELEPHONE ENCOUNTER
Patient is coming in for a physical on 3/5 and would like to get her blood drawn prior to her appointment. Please advise. Thank you.

## 2020-03-04 ENCOUNTER — LAB ENCOUNTER (OUTPATIENT)
Dept: LAB | Age: 45
End: 2020-03-04
Attending: FAMILY MEDICINE
Payer: MEDICAID

## 2020-03-04 DIAGNOSIS — Z00.00 WELL ADULT EXAM: ICD-10-CM

## 2020-03-04 DIAGNOSIS — R73.9 HYPERGLYCEMIA: ICD-10-CM

## 2020-03-04 LAB
ALBUMIN SERPL-MCNC: 3.8 G/DL (ref 3.4–5)
ALBUMIN/GLOB SERPL: 0.9 {RATIO} (ref 1–2)
ALP LIVER SERPL-CCNC: 109 U/L (ref 37–98)
ALT SERPL-CCNC: 23 U/L (ref 13–56)
ANION GAP SERPL CALC-SCNC: 7 MMOL/L (ref 0–18)
AST SERPL-CCNC: 10 U/L (ref 15–37)
BASOPHILS # BLD AUTO: 0.06 X10(3) UL (ref 0–0.2)
BASOPHILS NFR BLD AUTO: 0.8 %
BILIRUB SERPL-MCNC: 0.4 MG/DL (ref 0.1–2)
BUN BLD-MCNC: 14 MG/DL (ref 7–18)
BUN/CREAT SERPL: 13.7 (ref 10–20)
CALCIUM BLD-MCNC: 9.6 MG/DL (ref 8.5–10.1)
CHLORIDE SERPL-SCNC: 105 MMOL/L (ref 98–112)
CHOLEST SMN-MCNC: 216 MG/DL (ref ?–200)
CO2 SERPL-SCNC: 26 MMOL/L (ref 21–32)
CREAT BLD-MCNC: 1.02 MG/DL (ref 0.55–1.02)
DEPRECATED RDW RBC AUTO: 40 FL (ref 35.1–46.3)
EOSINOPHIL # BLD AUTO: 0.11 X10(3) UL (ref 0–0.7)
EOSINOPHIL NFR BLD AUTO: 1.5 %
ERYTHROCYTE [DISTWIDTH] IN BLOOD BY AUTOMATED COUNT: 12.8 % (ref 11–15)
GLOBULIN PLAS-MCNC: 4.2 G/DL (ref 2.8–4.4)
GLUCOSE BLD-MCNC: 112 MG/DL (ref 70–99)
HCT VFR BLD AUTO: 41.2 % (ref 35–48)
HDLC SERPL-MCNC: 44 MG/DL (ref 40–59)
HGB BLD-MCNC: 14.1 G/DL (ref 12–16)
IMM GRANULOCYTES # BLD AUTO: 0.02 X10(3) UL (ref 0–1)
IMM GRANULOCYTES NFR BLD: 0.3 %
LDLC SERPL CALC-MCNC: 146 MG/DL (ref ?–100)
LYMPHOCYTES # BLD AUTO: 2.19 X10(3) UL (ref 1–4)
LYMPHOCYTES NFR BLD AUTO: 29.5 %
M PROTEIN MFR SERPL ELPH: 8 G/DL (ref 6.4–8.2)
MCH RBC QN AUTO: 29.7 PG (ref 26–34)
MCHC RBC AUTO-ENTMCNC: 34.2 G/DL (ref 31–37)
MCV RBC AUTO: 86.7 FL (ref 80–100)
MONOCYTES # BLD AUTO: 0.51 X10(3) UL (ref 0.1–1)
MONOCYTES NFR BLD AUTO: 6.9 %
NEUTROPHILS # BLD AUTO: 4.54 X10 (3) UL (ref 1.5–7.7)
NEUTROPHILS # BLD AUTO: 4.54 X10(3) UL (ref 1.5–7.7)
NEUTROPHILS NFR BLD AUTO: 61 %
NONHDLC SERPL-MCNC: 172 MG/DL (ref ?–130)
OSMOLALITY SERPL CALC.SUM OF ELEC: 287 MOSM/KG (ref 275–295)
PATIENT FASTING Y/N/NP: YES
PATIENT FASTING Y/N/NP: YES
PLATELET # BLD AUTO: 412 10(3)UL (ref 150–450)
POTASSIUM SERPL-SCNC: 3.7 MMOL/L (ref 3.5–5.1)
RBC # BLD AUTO: 4.75 X10(6)UL (ref 3.8–5.3)
SODIUM SERPL-SCNC: 138 MMOL/L (ref 136–145)
TRIGL SERPL-MCNC: 129 MG/DL (ref 30–149)
TSI SER-ACNC: 2.33 MIU/ML (ref 0.36–3.74)
VLDLC SERPL CALC-MCNC: 26 MG/DL (ref 0–30)
WBC # BLD AUTO: 7.4 X10(3) UL (ref 4–11)

## 2020-03-04 PROCEDURE — 85025 COMPLETE CBC W/AUTO DIFF WBC: CPT

## 2020-03-04 PROCEDURE — 84443 ASSAY THYROID STIM HORMONE: CPT

## 2020-03-04 PROCEDURE — 36415 COLL VENOUS BLD VENIPUNCTURE: CPT

## 2020-03-04 PROCEDURE — 80053 COMPREHEN METABOLIC PANEL: CPT

## 2020-03-04 PROCEDURE — 80061 LIPID PANEL: CPT

## 2020-03-04 PROCEDURE — 83036 HEMOGLOBIN GLYCOSYLATED A1C: CPT

## 2020-03-05 ENCOUNTER — OFFICE VISIT (OUTPATIENT)
Dept: FAMILY MEDICINE CLINIC | Facility: CLINIC | Age: 45
End: 2020-03-05
Payer: MEDICAID

## 2020-03-05 VITALS
WEIGHT: 213 LBS | TEMPERATURE: 99 F | HEIGHT: 63.9 IN | HEART RATE: 82 BPM | SYSTOLIC BLOOD PRESSURE: 115 MMHG | OXYGEN SATURATION: 98 % | DIASTOLIC BLOOD PRESSURE: 82 MMHG | BODY MASS INDEX: 36.82 KG/M2

## 2020-03-05 DIAGNOSIS — R73.9 HYPERGLYCEMIA: ICD-10-CM

## 2020-03-05 DIAGNOSIS — F41.9 ANXIETY AND DEPRESSION: ICD-10-CM

## 2020-03-05 DIAGNOSIS — K59.09 CHRONIC CONSTIPATION: ICD-10-CM

## 2020-03-05 DIAGNOSIS — D35.2 PITUITARY ADENOMA (HCC): ICD-10-CM

## 2020-03-05 DIAGNOSIS — G47.33 OSA (OBSTRUCTIVE SLEEP APNEA): ICD-10-CM

## 2020-03-05 DIAGNOSIS — F32.A ANXIETY AND DEPRESSION: ICD-10-CM

## 2020-03-05 DIAGNOSIS — Z00.00 WELL ADULT EXAM: Primary | ICD-10-CM

## 2020-03-05 DIAGNOSIS — J45.998 SEASONAL ASTHMA: ICD-10-CM

## 2020-03-05 DIAGNOSIS — E03.9 HYPOTHYROIDISM, UNSPECIFIED TYPE: ICD-10-CM

## 2020-03-05 DIAGNOSIS — E78.00 HYPERCHOLESTEREMIA: ICD-10-CM

## 2020-03-05 DIAGNOSIS — R74.8 ELEVATED ALKALINE PHOSPHATASE LEVEL: ICD-10-CM

## 2020-03-05 DIAGNOSIS — K21.9 GASTROESOPHAGEAL REFLUX DISEASE, ESOPHAGITIS PRESENCE NOT SPECIFIED: ICD-10-CM

## 2020-03-05 DIAGNOSIS — E66.9 OBESITY (BMI 30-39.9): ICD-10-CM

## 2020-03-05 PROBLEM — R60.0 LOWER EXTREMITY EDEMA: Status: RESOLVED | Noted: 2017-10-31 | Resolved: 2020-03-05

## 2020-03-05 PROBLEM — IMO0002 POSTURAL VERTIGO: Status: RESOLVED | Noted: 2017-06-27 | Resolved: 2020-03-05

## 2020-03-05 PROBLEM — F50.25 BULIMIA NERVOSA IN REMISSION: Status: RESOLVED | Noted: 2017-10-31 | Resolved: 2020-03-05

## 2020-03-05 PROBLEM — F41.1 GENERALIZED ANXIETY DISORDER: Status: RESOLVED | Noted: 2018-01-26 | Resolved: 2020-03-05

## 2020-03-05 PROBLEM — F50.2: Status: RESOLVED | Noted: 2017-10-31 | Resolved: 2020-03-05

## 2020-03-05 PROBLEM — B00.9 HERPES SIMPLEX VIRUS (HSV) INFECTION: Status: RESOLVED | Noted: 2017-10-16 | Resolved: 2020-03-05

## 2020-03-05 PROBLEM — R63.5 WEIGHT GAIN: Status: RESOLVED | Noted: 2019-02-28 | Resolved: 2020-03-05

## 2020-03-05 PROBLEM — R06.83 SNORING: Status: RESOLVED | Noted: 2017-10-31 | Resolved: 2020-03-05

## 2020-03-05 PROBLEM — L71.0 PERIORAL DERMATITIS: Status: RESOLVED | Noted: 2020-02-10 | Resolved: 2020-03-05

## 2020-03-05 PROBLEM — D48.5 NEOPLASM OF UNCERTAIN BEHAVIOR OF SKIN: Status: RESOLVED | Noted: 2017-10-16 | Resolved: 2020-03-05

## 2020-03-05 PROBLEM — R53.83 FATIGUE: Status: RESOLVED | Noted: 2018-02-23 | Resolved: 2020-03-05

## 2020-03-05 PROBLEM — F50.81 BINGE EATING DISORDER: Status: RESOLVED | Noted: 2017-10-31 | Resolved: 2020-03-05

## 2020-03-05 PROBLEM — L81.0 HYPERPIGMENTATION OF SKIN, POSTINFLAMMATORY: Status: RESOLVED | Noted: 2020-02-10 | Resolved: 2020-03-05

## 2020-03-05 PROBLEM — F50.2 BULIMIA NERVOSA IN REMISSION: Status: RESOLVED | Noted: 2017-10-31 | Resolved: 2020-03-05

## 2020-03-05 PROBLEM — F50.819 BINGE EATING DISORDER: Status: RESOLVED | Noted: 2017-10-31 | Resolved: 2020-03-05

## 2020-03-05 LAB
EST. AVERAGE GLUCOSE BLD GHB EST-MCNC: 117 MG/DL (ref 68–126)
HBA1C MFR BLD HPLC: 5.7 % (ref ?–5.7)

## 2020-03-05 PROCEDURE — 99396 PREV VISIT EST AGE 40-64: CPT | Performed by: FAMILY MEDICINE

## 2020-03-05 NOTE — PROGRESS NOTES
HPI:    Patient ID: Lin Phoenix is a 38year old female.     HPI  Patient presents with:  Routine Physical: px and lab results         Review of Systems   Constitutional: Negative for activity change, appetite change, chills, fatigue, fever and unexpect pituitary gland (HCC)    • Bulimia nervosa in remission    • Chest pain 2011    hospitalize   • Depression     mes--stopped on own   • GERD (gastroesophageal reflux disease)    • Hypercholesteremia    • IBS (irritable bowel syndrome)    • Insomnia 2013 Physically abused: Not on file        Forced sexual activity: Not on file    Other Topics      Concerns:         Service: Not Asked        Blood Transfusions: Not Asked        Caffeine Concern: Yes          soda, tea, 8 oz daily        Occupational TAKE 1 TABLET BY MOUTH EVERY MORNING.  TAKE WITH 10 MG DAILY 90 tablet 0   • Levothyroxine Sodium 88 MCG Oral Tab TK 1 T PO D  2   • MIRENA, 52 MG, 20 MCG/24HR Intrauterine IUD   0   • Albuterol Sulfate HFA (PROAIR HFA) 108 (90 BASE) MCG/ACT Inhalation Aero esophagitis presence not specified  Anxiety and depression  Pituitary adenoma (hcc)  Obesity (bmi 30-39. 9)  Chronic constipation  Seasonal asthma  Hypercholesteremia  Francesco (obstructive sleep apnea)  Hyperglycemia  Elevated alkaline phosphatase level    1.  Kelly Mccray

## 2020-03-10 ENCOUNTER — TELEPHONE (OUTPATIENT)
Dept: FAMILY MEDICINE CLINIC | Facility: CLINIC | Age: 45
End: 2020-03-10

## 2020-03-10 NOTE — TELEPHONE ENCOUNTER
Dr Joanne Chung, please advise. Advised patient on Dr. Oneyda Perez information and recommendations. Patient verbalized understanding.  She asked when should she get next A1c, next follow-up, or wait until next physical exam?    She prefers not to use [employer]

## 2020-03-13 RX ORDER — PAROXETINE 30 MG/1
TABLET, FILM COATED ORAL
Qty: 90 TABLET | Refills: 1 | Status: SHIPPED | OUTPATIENT
Start: 2020-03-13 | End: 2020-09-17

## 2020-03-13 NOTE — TELEPHONE ENCOUNTER
Refill passed per [facility] protocol.   Refill Protocol Appointment Criteria  · Appointment scheduled in the past 6 months or in the next 3 months  Recent Outpatient Visits            1 week ago Well adult exam    [facility], [address]

## 2020-03-25 ENCOUNTER — NURSE TRIAGE (OUTPATIENT)
Dept: OTHER | Age: 45
End: 2020-03-25

## 2020-03-25 DIAGNOSIS — R09.82 POST-NASAL DRIP: ICD-10-CM

## 2020-03-25 DIAGNOSIS — R05.9 COUGH: ICD-10-CM

## 2020-03-25 DIAGNOSIS — R11.2 NAUSEA AND VOMITING, INTRACTABILITY OF VOMITING NOT SPECIFIED, UNSPECIFIED VOMITING TYPE: Primary | ICD-10-CM

## 2020-03-25 PROCEDURE — 99212 OFFICE O/P EST SF 10 MIN: CPT | Performed by: FAMILY MEDICINE

## 2020-03-25 NOTE — TELEPHONE ENCOUNTER
TELEPHONE VISIT PROGRESS NOTE  Todays date: 3/25/2020 10:00 AM      Most recent Nurse Triage message/ Mychart message from patient:     SUMMARY:      Per Covid 19 Protocol     Patient does not meet the criteria for COVID 19 testing.        Per the system p seen     Due to the COVID-19 emergency implementation plan, this patient's incoming call was converted to a telephone visit as agreed upon with the patient.     Virtual/Telephone Check-In    [address] [place] [address]   verbally consents to a Virtual/Telephone Check-In appropriately. Patient did not appear short of breath.     ASSESSMENT/ PLAN:    Nausea and vomiting, intractability of vomiting not specified, unspecified vomiting type  (primary encounter diagnosis)  Cough  Post-nasal drip    Discussed with patient that t

## 2020-03-25 NOTE — TELEPHONE ENCOUNTER
Action Requested: Summary for Provider     []  Critical Lab, Recommendations Needed  [] Need Additional Advice  []   FYI    []   Need Orders  [] Need Medications Sent to Pharmacy  []  Other     SUMMARY:     Per Covid 19 Protocol    Patient does not meet th Reason for Disposition  • Patient wants to be seen    Protocols used: COMMON COLD-A-OH

## 2020-05-11 ENCOUNTER — TELEPHONE (OUTPATIENT)
Dept: GASTROENTEROLOGY | Facility: CLINIC | Age: 45
End: 2020-05-11

## 2020-06-17 RX ORDER — HYDROCHLOROTHIAZIDE 12.5 MG/1
CAPSULE, GELATIN COATED ORAL
Qty: 30 CAPSULE | Refills: 1 | Status: SHIPPED | OUTPATIENT
Start: 2020-06-17 | End: 2020-10-21

## 2020-09-17 RX ORDER — PAROXETINE 30 MG/1
TABLET, FILM COATED ORAL
Qty: 90 TABLET | Refills: 1 | Status: SHIPPED | OUTPATIENT
Start: 2020-09-17 | End: 2021-03-20

## 2020-09-28 RX ORDER — PHENTERMINE HYDROCHLORIDE 37.5 MG/1
TABLET ORAL
Qty: 30 TABLET | Refills: 0 | OUTPATIENT
Start: 2020-09-28

## 2020-10-21 NOTE — PROGRESS NOTES
[address] [place] [address],[address]  Dept: [phone]       Patient:  Orville Herrera  :      1975  MRN:      [id number]    Chief Complaint:  Patient presents wi topically daily. 45 g 6   • Levothyroxine Sodium 88 MCG Oral Tab TK 1 T PO D  2   • MIRENA, 52 MG, 20 MCG/24HR Intrauterine IUD   0   • Albuterol Sulfate HFA (PROAIR HFA) 108 (90 BASE) MCG/ACT Inhalation Aero Soln Inhale  into the lungs.      • Multiple Vit Exposure: Not Asked        Hobby Hazards: Not Asked        Sleep Concern: Not Asked        Stress Concern: Not Asked        Weight Concern: Not Asked        Special Diet: Not Asked        Back Care: Not Asked        Exercise: Not Asked        Bike Helmet: breakfast, Eat 3 meals per day, Plan meals in advance, Read nutrition labels, Drink 64 oz of water per day, Maintain a daily food journal, No drinking 30 minutes before or after meals, Utlize portion control strategies to reduce calorie intake, Identify tr current medication. No changes initiated on this visit. Binge eating: improved with phentermine    DYSLIPIDEMIA: Stable on the above prescribed meal plan and medication. Liver function stable.     Lab Results   Component Value Date/Time    CHOLEST 216

## 2020-10-28 ENCOUNTER — TELEPHONE (OUTPATIENT)
Dept: SURGERY | Facility: CLINIC | Age: 45
End: 2020-10-28

## 2020-10-28 DIAGNOSIS — R73.03 PRE-DIABETES: ICD-10-CM

## 2020-10-28 RX ORDER — METFORMIN HYDROCHLORIDE 500 MG/1
500 TABLET, EXTENDED RELEASE ORAL DAILY
Qty: 90 TABLET | Refills: 0 | Status: SHIPPED | OUTPATIENT
Start: 2020-10-28 | End: 2020-11-23

## 2020-10-28 NOTE — TELEPHONE ENCOUNTER
PATIENT RECEIVED 1 MONTH SUPPLY OF METFORMIN, SHE WILL NEED ANOTHER REFILL PRIOR TO APPOINTMENT IN DEC.

## 2020-11-21 DIAGNOSIS — R73.03 PRE-DIABETES: ICD-10-CM

## 2020-11-23 RX ORDER — METFORMIN HYDROCHLORIDE 500 MG/1
TABLET, EXTENDED RELEASE ORAL
Qty: 30 TABLET | Refills: 0 | Status: SHIPPED | OUTPATIENT
Start: 2020-11-23 | End: 2021-05-20

## 2020-12-29 NOTE — TELEPHONE ENCOUNTER
I mailed out 3rd and last letter to patient reminding her of pending overdue labs and I sent message via [employer].

## 2020-12-31 ENCOUNTER — OFFICE VISIT (OUTPATIENT)
Dept: FAMILY MEDICINE CLINIC | Facility: CLINIC | Age: 45
End: 2020-12-31
Payer: MEDICAID

## 2020-12-31 ENCOUNTER — APPOINTMENT (OUTPATIENT)
Dept: LAB | Age: 45
End: 2020-12-31
Attending: FAMILY MEDICINE
Payer: MEDICAID

## 2020-12-31 ENCOUNTER — HOSPITAL ENCOUNTER (OUTPATIENT)
Dept: GENERAL RADIOLOGY | Age: 45
Discharge: HOME | End: 2020-12-31
Attending: FAMILY MEDICINE
Payer: MEDICAID

## 2020-12-31 VITALS
HEART RATE: 108 BPM | HEIGHT: 63.9 IN | SYSTOLIC BLOOD PRESSURE: 114 MMHG | WEIGHT: 214 LBS | TEMPERATURE: 97 F | RESPIRATION RATE: 20 BRPM | BODY MASS INDEX: 36.99 KG/M2 | DIASTOLIC BLOOD PRESSURE: 73 MMHG

## 2020-12-31 DIAGNOSIS — S99.912A INJURY OF LEFT ANKLE, INITIAL ENCOUNTER: Primary | ICD-10-CM

## 2020-12-31 DIAGNOSIS — S99.912A INJURY OF LEFT ANKLE, INITIAL ENCOUNTER: ICD-10-CM

## 2020-12-31 PROCEDURE — 3008F BODY MASS INDEX DOCD: CPT | Performed by: FAMILY MEDICINE

## 2020-12-31 PROCEDURE — 73610 X-RAY EXAM OF ANKLE: CPT | Performed by: FAMILY MEDICINE

## 2020-12-31 PROCEDURE — 3078F DIAST BP <80 MM HG: CPT | Performed by: FAMILY MEDICINE

## 2020-12-31 PROCEDURE — 3074F SYST BP LT 130 MM HG: CPT | Performed by: FAMILY MEDICINE

## 2020-12-31 PROCEDURE — 99213 OFFICE O/P EST LOW 20 MIN: CPT | Performed by: FAMILY MEDICINE

## 2020-12-31 NOTE — PROGRESS NOTES
HPI:    Patient ID: Brittany Gage is a 54year old female. Pt presents with multiple injuries to the left ankle since Thanksgiving. Pt states ankle is giving out now. Pt can bear weight on ankle.  Pt states she has had multiple falls as ankle gives out orders of the defined types were placed in this encounter.       Meds This Visit:  Requested Prescriptions      No prescriptions requested or ordered in this encounter       Imaging & Referrals:  PHYSIATRY - INTERNAL  XR ANKLE (MIN 3 VIEWS), LEFT (CPT=[id number]

## 2021-03-20 RX ORDER — PAROXETINE 30 MG/1
30 TABLET, FILM COATED ORAL EVERY MORNING
Qty: 90 TABLET | Refills: 0 | Status: SHIPPED | OUTPATIENT
Start: 2021-03-20 | End: 2021-06-23

## 2021-04-20 DIAGNOSIS — E66.9 OBESITY (BMI 30-39.9): ICD-10-CM

## 2021-04-20 DIAGNOSIS — R73.03 PRE-DIABETES: ICD-10-CM

## 2021-04-21 RX ORDER — METFORMIN HYDROCHLORIDE 500 MG/1
500 TABLET, EXTENDED RELEASE ORAL DAILY
Qty: 30 TABLET | Refills: 0 | OUTPATIENT
Start: 2021-04-21

## 2021-04-21 RX ORDER — PHENTERMINE HYDROCHLORIDE 37.5 MG/1
TABLET ORAL
Qty: 30 TABLET | Refills: 2 | OUTPATIENT
Start: 2021-04-21

## 2021-04-26 DIAGNOSIS — E66.9 OBESITY (BMI 30-39.9): ICD-10-CM

## 2021-04-26 DIAGNOSIS — R73.03 PRE-DIABETES: ICD-10-CM

## 2021-04-26 RX ORDER — PHENTERMINE HYDROCHLORIDE 37.5 MG/1
TABLET ORAL
Qty: 30 TABLET | Refills: 0 | OUTPATIENT
Start: 2021-04-26

## 2021-04-26 RX ORDER — METFORMIN HYDROCHLORIDE 500 MG/1
TABLET, EXTENDED RELEASE ORAL
Qty: 30 TABLET | Refills: 0 | OUTPATIENT
Start: 2021-04-26

## 2021-05-03 ENCOUNTER — TELEPHONE (OUTPATIENT)
Dept: FAMILY MEDICINE CLINIC | Facility: CLINIC | Age: 46
End: 2021-05-03

## 2021-05-03 DIAGNOSIS — Z00.00 WELL ADULT EXAM: Primary | ICD-10-CM

## 2021-05-03 NOTE — TELEPHONE ENCOUNTER
Dr. Amanda Jaime, patient is schedule for a Physical on 05/19/2021. Patient is requesting blood test order for appointment. Please advise.

## 2021-05-03 NOTE — TELEPHONE ENCOUNTER
Patient would like to have any labs done before her physical appointment on 5/19/21. Please call when orders are placed.

## 2021-05-18 ENCOUNTER — LAB ENCOUNTER (OUTPATIENT)
Dept: LAB | Age: 46
End: 2021-05-18
Attending: FAMILY MEDICINE
Payer: COMMERCIAL

## 2021-05-18 DIAGNOSIS — Z00.00 WELL ADULT EXAM: ICD-10-CM

## 2021-05-18 DIAGNOSIS — R73.9 HYPERGLYCEMIA: ICD-10-CM

## 2021-05-18 PROCEDURE — 36415 COLL VENOUS BLD VENIPUNCTURE: CPT

## 2021-05-18 PROCEDURE — 83036 HEMOGLOBIN GLYCOSYLATED A1C: CPT

## 2021-05-18 PROCEDURE — 80061 LIPID PANEL: CPT

## 2021-05-18 PROCEDURE — 84443 ASSAY THYROID STIM HORMONE: CPT

## 2021-05-18 PROCEDURE — 80053 COMPREHEN METABOLIC PANEL: CPT

## 2021-05-18 PROCEDURE — 85025 COMPLETE CBC W/AUTO DIFF WBC: CPT

## 2021-05-19 ENCOUNTER — OFFICE VISIT (OUTPATIENT)
Dept: FAMILY MEDICINE CLINIC | Facility: CLINIC | Age: 46
End: 2021-05-19
Payer: COMMERCIAL

## 2021-05-19 VITALS
HEART RATE: 105 BPM | SYSTOLIC BLOOD PRESSURE: 110 MMHG | BODY MASS INDEX: 37.68 KG/M2 | DIASTOLIC BLOOD PRESSURE: 75 MMHG | HEIGHT: 63.9 IN | TEMPERATURE: 98 F | WEIGHT: 218 LBS

## 2021-05-19 DIAGNOSIS — Z97.5 IUD CONTRACEPTION: ICD-10-CM

## 2021-05-19 DIAGNOSIS — E78.00 HYPERCHOLESTEREMIA: ICD-10-CM

## 2021-05-19 DIAGNOSIS — K59.09 CHRONIC CONSTIPATION: ICD-10-CM

## 2021-05-19 DIAGNOSIS — E03.9 HYPOTHYROIDISM, UNSPECIFIED TYPE: ICD-10-CM

## 2021-05-19 DIAGNOSIS — F32.A ANXIETY AND DEPRESSION: ICD-10-CM

## 2021-05-19 DIAGNOSIS — D35.2 PITUITARY ADENOMA (HCC): ICD-10-CM

## 2021-05-19 DIAGNOSIS — Z23 NEED FOR TDAP VACCINATION: ICD-10-CM

## 2021-05-19 DIAGNOSIS — Z12.31 ENCOUNTER FOR SCREENING MAMMOGRAM FOR BREAST CANCER: ICD-10-CM

## 2021-05-19 DIAGNOSIS — F41.9 ANXIETY AND DEPRESSION: ICD-10-CM

## 2021-05-19 DIAGNOSIS — J45.998 SEASONAL ASTHMA: ICD-10-CM

## 2021-05-19 DIAGNOSIS — Z00.00 WELL ADULT EXAM: Primary | ICD-10-CM

## 2021-05-19 DIAGNOSIS — G47.33 OSA (OBSTRUCTIVE SLEEP APNEA): ICD-10-CM

## 2021-05-19 DIAGNOSIS — R73.9 HYPERGLYCEMIA: ICD-10-CM

## 2021-05-19 DIAGNOSIS — K21.9 GASTROESOPHAGEAL REFLUX DISEASE, UNSPECIFIED WHETHER ESOPHAGITIS PRESENT: ICD-10-CM

## 2021-05-19 DIAGNOSIS — E66.9 OBESITY (BMI 30-39.9): ICD-10-CM

## 2021-05-19 DIAGNOSIS — R74.8 ELEVATED ALKALINE PHOSPHATASE LEVEL: ICD-10-CM

## 2021-05-19 PROCEDURE — 3008F BODY MASS INDEX DOCD: CPT | Performed by: FAMILY MEDICINE

## 2021-05-19 PROCEDURE — 99396 PREV VISIT EST AGE 40-64: CPT | Performed by: FAMILY MEDICINE

## 2021-05-19 PROCEDURE — 3074F SYST BP LT 130 MM HG: CPT | Performed by: FAMILY MEDICINE

## 2021-05-19 PROCEDURE — 3078F DIAST BP <80 MM HG: CPT | Performed by: FAMILY MEDICINE

## 2021-05-19 RX ORDER — ATORVASTATIN CALCIUM 10 MG/1
10 TABLET, FILM COATED ORAL NIGHTLY
Qty: 90 TABLET | Refills: 1 | Status: SHIPPED | OUTPATIENT
Start: 2021-05-19 | End: 2021-09-10

## 2021-05-19 NOTE — PROGRESS NOTES
HPI:    Patient ID: Sushil Beach is a 54year old female. HPI  Patient presents with: Well Adult: sees gyne     Patient here for physical.  She sees gynecology. She has not been doing well with her weight.   She states has a history of bulimia in th weakness, numbness and headaches. Hematological: Does not bruise/bleed easily. Psychiatric/Behavioral: Positive for behavioral problems. Negative for decreased concentration, self-injury, sleep disturbance and suicidal ideas.  The patient is not nervous Asked        Sleep Concern: Not Asked        Stress Concern: Not Asked        Weight Concern: Not Asked        Special Diet: Not Asked        Back Care: Not Asked        Exercise: Not Asked        Bike Helmet: Not Asked        Seat Belt: Not Asked        S done  Pneumococcal Vaccine: Birth to 64yrs(1 of 1 - PPSV23) Never done  Mammogram due on 10/02/2020  Annual Physical due on 03/05/2021  Pap Smear,3 Years due on 08/21/2021  Influenza Vaccine(Season Ended) due on 10/01/2021  Annual Depression Screen due on Pulmonary:      Effort: Pulmonary effort is normal.      Breath sounds: Normal breath sounds. No wheezing. Abdominal:      General: Bowel sounds are normal.      Palpations: Abdomen is soft. There is no mass. Tenderness:  There is no abdominal te intake. Recommending avoiding foods high in fat content. Recommend exercising at least 30-40 minutes 5-6 days a week. Avoid skipping meals. Making healthy choices for snacks and also limiting sugary beverages. Follow up weight management    4.  Morales Lynne

## 2021-05-20 ENCOUNTER — TELEPHONE (OUTPATIENT)
Dept: SURGERY | Facility: CLINIC | Age: 46
End: 2021-05-20

## 2021-05-20 DIAGNOSIS — R73.03 PRE-DIABETES: ICD-10-CM

## 2021-05-20 DIAGNOSIS — E66.9 OBESITY (BMI 30-39.9): ICD-10-CM

## 2021-05-20 RX ORDER — METFORMIN HYDROCHLORIDE 500 MG/1
500 TABLET, EXTENDED RELEASE ORAL DAILY
Qty: 90 TABLET | Refills: 0 | Status: SHIPPED | OUTPATIENT
Start: 2021-05-20 | End: 2021-09-10

## 2021-05-20 RX ORDER — PHENTERMINE HYDROCHLORIDE 37.5 MG/1
TABLET ORAL
Qty: 30 TABLET | Refills: 2 | Status: SHIPPED | OUTPATIENT
Start: 2021-05-20 | End: 2021-09-10

## 2021-05-20 NOTE — TELEPHONE ENCOUNTER
, Patient called to request rx refills for Metformin 500, Phentermine 37.5. Patient's last office visit was on 10/21/20 and was able to schedule appointment for next available which was 8/3/21.  Patient stated that  recently had patient comp

## 2021-05-24 ENCOUNTER — TELEPHONE (OUTPATIENT)
Dept: FAMILY MEDICINE CLINIC | Facility: CLINIC | Age: 46
End: 2021-05-24

## 2021-05-24 NOTE — TELEPHONE ENCOUNTER
LOV 5-    Patient inquiring about her  results. Informed patient that test results now post immediately to their  [employer] account. However, their provider may not have had the chance to review or comment on them before the results reach them.     O

## 2021-06-07 NOTE — TELEPHONE ENCOUNTER
Patient was called and given provider's recommendations  Patient verbalized understanding and agreed to plan of care  Diet and exercise changes discussed with patient

## 2021-06-07 NOTE — TELEPHONE ENCOUNTER
A1c suggest 6.1 which is prediabetic. Highly recommend weight loss and cutting out simple sugars such as white bread, white rice, tortillas, potatoes as well as sweets. Recommend aerobic exercise 330 to 5 minutes 5 days a week.

## 2021-06-09 ENCOUNTER — HOSPITAL ENCOUNTER (OUTPATIENT)
Dept: MAMMOGRAPHY | Age: 46
Discharge: HOME | End: 2021-06-09
Attending: FAMILY MEDICINE
Payer: COMMERCIAL

## 2021-06-09 DIAGNOSIS — Z12.31 ENCOUNTER FOR SCREENING MAMMOGRAM FOR BREAST CANCER: ICD-10-CM

## 2021-06-09 PROCEDURE — 77063 BREAST TOMOSYNTHESIS BI: CPT | Performed by: FAMILY MEDICINE

## 2021-06-09 PROCEDURE — 77067 SCR MAMMO BI INCL CAD: CPT | Performed by: FAMILY MEDICINE

## 2021-06-23 RX ORDER — PAROXETINE 30 MG/1
TABLET, FILM COATED ORAL
Qty: 90 TABLET | Refills: 0 | Status: SHIPPED | OUTPATIENT
Start: 2021-06-23 | End: 2021-09-10

## 2021-07-14 ENCOUNTER — VIRTUAL PHONE E/M (OUTPATIENT)
Dept: FAMILY MEDICINE CLINIC | Facility: CLINIC | Age: 46
End: 2021-07-14
Payer: COMMERCIAL

## 2021-07-14 DIAGNOSIS — R05.9 COUGH: ICD-10-CM

## 2021-07-14 PROCEDURE — 99213 OFFICE O/P EST LOW 20 MIN: CPT | Performed by: FAMILY MEDICINE

## 2021-07-14 RX ORDER — AZITHROMYCIN 250 MG/1
TABLET, FILM COATED ORAL
Qty: 6 TABLET | Refills: 0 | Status: SHIPPED | OUTPATIENT
Start: 2021-07-14 | End: 2021-07-19

## 2021-07-14 NOTE — PROGRESS NOTES
HPI/Subjective:   Patient ID: Yee Anglin is a 54year old female. Virtual Telephone Check-In    Yee Anglin verbally consents to a Virtual/Telephone Check-In visit on 07/14/21.   Patient has been referred to the [facility] website at [email or web address] Oral Cap Take  by mouth.  (Patient not taking: Reported on 5/19/2021 )       Allergies:  Amoxicillin             HIVES  Potassium               HIVES  Vancomycin              ITCHING    Objective:   Physical Exam  Constitutional:       Appearance: Normal ap

## 2021-07-22 ENCOUNTER — TELEMEDICINE (OUTPATIENT)
Dept: GASTROENTEROLOGY | Facility: CLINIC | Age: 46
End: 2021-07-22
Payer: COMMERCIAL

## 2021-07-22 ENCOUNTER — TELEPHONE (OUTPATIENT)
Dept: FAMILY MEDICINE CLINIC | Facility: CLINIC | Age: 46
End: 2021-07-22

## 2021-07-22 ENCOUNTER — HOSPITAL ENCOUNTER (OUTPATIENT)
Age: 46
Discharge: HOME | End: 2021-07-22
Payer: COMMERCIAL

## 2021-07-22 VITALS
BODY MASS INDEX: 37.56 KG/M2 | SYSTOLIC BLOOD PRESSURE: 122 MMHG | RESPIRATION RATE: 16 BRPM | DIASTOLIC BLOOD PRESSURE: 66 MMHG | HEIGHT: 63 IN | TEMPERATURE: 98 F | HEART RATE: 88 BPM | WEIGHT: 212 LBS | OXYGEN SATURATION: 98 %

## 2021-07-22 DIAGNOSIS — R74.8 ALKALINE PHOSPHATASE ELEVATION: Primary | ICD-10-CM

## 2021-07-22 DIAGNOSIS — K59.04 CHRONIC IDIOPATHIC CONSTIPATION: ICD-10-CM

## 2021-07-22 DIAGNOSIS — K58.1 IRRITABLE BOWEL SYNDROME WITH CONSTIPATION: ICD-10-CM

## 2021-07-22 DIAGNOSIS — R60.9 SUBMANDIBULAR GLAND SWELLING: Primary | ICD-10-CM

## 2021-07-22 LAB — S PYO AG THROAT QL: NEGATIVE

## 2021-07-22 PROCEDURE — 87880 STREP A ASSAY W/OPTIC: CPT | Performed by: NURSE PRACTITIONER

## 2021-07-22 PROCEDURE — 99213 OFFICE O/P EST LOW 20 MIN: CPT | Performed by: NURSE PRACTITIONER

## 2021-07-22 PROCEDURE — 99214 OFFICE O/P EST MOD 30 MIN: CPT | Performed by: INTERNAL MEDICINE

## 2021-07-22 RX ORDER — CEPHALEXIN 500 MG/1
500 CAPSULE ORAL 4 TIMES DAILY
Qty: 40 CAPSULE | Refills: 0 | Status: SHIPPED | OUTPATIENT
Start: 2021-07-22 | End: 2021-08-01

## 2021-07-22 RX ORDER — PLECANATIDE 3 MG/1
1 TABLET ORAL DAILY
Qty: 90 TABLET | Refills: 3 | Status: SHIPPED | OUTPATIENT
Start: 2021-07-22

## 2021-07-22 NOTE — TELEPHONE ENCOUNTER
Cold sore Sunday, allergy symptoms on Saturday, marble sized lump in the neck below the chin. Sinus headaches and pressure in the front , productive cough, clear.    Action Requested: Summary for Provider     []  Critical Lab, Recommendations Needed  [] N

## 2021-07-22 NOTE — ED PROVIDER NOTES
Patient presents with:  Cough/URI      HPI:     Mathew Coronel is a 54year old female who presents for evaluation and management of a chief complaint of submandibular gland swelling that started yesterday.   The patient states she had some nasal congestio Yes          soda, tea, 8 oz daily        Occupational Exposure: Not Asked        Hobby Hazards: Not Asked        Sleep Concern: Not Asked        Stress Concern: Not Asked        Weight Concern: Not Asked        Special Diet: Not Asked        Back Care: No nourished, well hydrated, no distress  SKIN: good skin turgor, no obvious rashes  NECK: supple, no adenopathy. No neck stiffness. No lymphadenopathy to the neck.   CARDIO: RRR without murmur  EXTREMITIES: no cyanosis, edema, NEWMAN without difficulty  GI: soft detailed discharge instructions for your condition today.     Follow Up with:  Winnie May MD  [place] [address]  [phone]    Schedule an appointment as soon as possible for a visit   As needed    Solomon Wolfe MD  [address]

## 2021-07-22 NOTE — TELEPHONE ENCOUNTER
Spoke with patient, (  verified ) informed of 's   instructions below    Shew iván go to [facility]     Provided locations/ hours for [place]    Patient verbalizes understanding and agrees.

## 2021-07-22 NOTE — PROGRESS NOTES
MARVEL Riley Video Visit    Parent/Caregiver verbally consents to a Virtual/Telephone Check-In service on 03/23/20. Patient understands and accepts financial responsibility for any deductible, co-insurance and/or co-pays associated with this service.     Please diarrhea. She had tried benefiber, dulcolax, stool softeners, miralax. Has used milk of magnesia and mag citrate which didn't work and nausea and vomiting. . Noted using suppositories every week and a half to help.  Noted bowel movements daily but exteremly Obesity (BMI 30-39. 9)    • MATILDE (obstructive sleep apnea)    • PMDD (premenstrual dysphoric disorder) 2013    Zoloft   • Pre-diabetes    • Thyroid disease    • Vertigo      Past Surgical History:   Procedure Laterality Date   • COLONOSCOPY  2017    [place] February 2019 ALT 59 without Alk phos at that time. Alk phos in August 2019 wa117 and unremarkable AST/ALT and bilirubin. A GGT in September 2019 was elevated. Since then alk phos has been just above normal at 109 in March 2020 and 111 May 2021.  Notably it

## 2021-07-22 NOTE — TELEPHONE ENCOUNTER
Message noted. Would recommend immediate care evaluation for these new symptoms if not better with zithromax.

## 2021-07-26 ENCOUNTER — OFFICE VISIT (OUTPATIENT)
Dept: OTOLARYNGOLOGY | Facility: CLINIC | Age: 46
End: 2021-07-26
Payer: COMMERCIAL

## 2021-07-26 VITALS — HEIGHT: 63.5 IN | WEIGHT: 212 LBS | TEMPERATURE: 98 F | BODY MASS INDEX: 37.1 KG/M2

## 2021-07-26 DIAGNOSIS — R59.0 SUBMENTAL LYMPHADENOPATHY: Primary | ICD-10-CM

## 2021-07-26 PROCEDURE — 99203 OFFICE O/P NEW LOW 30 MIN: CPT | Performed by: OTOLARYNGOLOGY

## 2021-07-26 PROCEDURE — 3008F BODY MASS INDEX DOCD: CPT | Performed by: OTOLARYNGOLOGY

## 2021-07-26 RX ORDER — PREDNISONE 20 MG/1
TABLET ORAL
Qty: 10 TABLET | Refills: 0 | Status: SHIPPED | OUTPATIENT
Start: 2021-07-26 | End: 2021-09-10

## 2021-07-26 NOTE — PROGRESS NOTES
Terry Kennedy is a 54year old female. Patient presents with:  Swelling: pt states has submandibular gland swelling,  has a lump under neath her chin. HISTORY OF PRESENT ILLNESS  7/26/2021  Patient presents for evaluation of a  A neck mass .   This Bulimia nervosa in remission    • Chest pain 2011    hospitalize   • Depression     mes--stopped on own   • GERD (gastroesophageal reflux disease)    • Hypercholesteremia    • Hypercholesteremia    • IBS (irritable bowel syndrome)    • Insomnia 2013    Zol Normal. Eyebrows - Normal. Skull - Normal.             Ears Normal Inspection - Right: Normal, Left: Normal. Canal - Right: Normal, Left: Normal. TM - Right: Normal, Left: Normal.   Skin Normal Inspection - Normal.        Lymph Detail Normal Submental. Sub back in 2 weeks  Bryn Edwards MD  7/26/2021 4:56 PM  This note was partially prepared using [employer] voice recognition dictation software. As a result, errors may occur. When identified, these errors have been corrected.  While every attempt is made

## 2021-07-27 ENCOUNTER — TELEPHONE (OUTPATIENT)
Dept: GASTROENTEROLOGY | Facility: CLINIC | Age: 46
End: 2021-07-27

## 2021-07-27 NOTE — TELEPHONE ENCOUNTER
Medication PA Requested:                                                          CoverMyMeds Used:  Key:[id number]   Sig:  Take 1 tablet by mouth daily.   DX Code:                                        Irritable bowel syndrome with constipation K58.1

## 2021-07-27 NOTE — TELEPHONE ENCOUNTER
PPD,    Please assist with PA for Trulance. Thank you!     CoverMyMeds  Key: [id number]     Irritable bowel syndrome with constipation K58.1      Chronic idiopathic constipation K59.04

## 2021-07-28 NOTE — TELEPHONE ENCOUNTER
Dorian, [phone], spoke with Supriya/tech. States it went through.    My chart message sent to patient of approval.     Medication PA Requested:Plecanatide (TRULANCE) 3 MG Oral Tab                                                          Cover

## 2021-08-09 ENCOUNTER — OFFICE VISIT (OUTPATIENT)
Dept: OTOLARYNGOLOGY | Facility: CLINIC | Age: 46
End: 2021-08-09
Payer: COMMERCIAL

## 2021-08-09 VITALS — WEIGHT: 212 LBS | HEIGHT: 63.5 IN | BODY MASS INDEX: 37.1 KG/M2

## 2021-08-09 DIAGNOSIS — R59.0 SUBMENTAL LYMPHADENOPATHY: Primary | ICD-10-CM

## 2021-08-09 PROCEDURE — 3008F BODY MASS INDEX DOCD: CPT | Performed by: OTOLARYNGOLOGY

## 2021-08-09 PROCEDURE — 99212 OFFICE O/P EST SF 10 MIN: CPT | Performed by: OTOLARYNGOLOGY

## 2021-08-09 NOTE — PROGRESS NOTES
Bj Ray is a 54year old female. Patient presents with:   Follow - Up: Patient here for 2 week f/u regarding Submental lymphadenopathy, per pt completed course of steroids swelling is better, still feels something is off       Helen Past Medical History:   Diagnosis Date   • Anxiety 2013    Zoloft   • Asthma    • Benign tumor of pituitary gland ([facility] [address]    • Bulimia nervosa in remission    • Chest pain 2011    hospitalize   • Depression     mes--stopped on own   • GERD (gastroesoph Normal. Cranial nerves - Cranial nerves II through XII grossly intact.    Head/Face Normal Facial features - Normal. Eyebrows - Normal. Skull - Normal.             Ears Normal Inspection - Right: Normal, Left: Normal. Canal - Right: Normal, Left: Normal. TM basis.  Argelia Fan MD   This note was partially prepared using [employer] voice recognition dictation software. As a result, errors may occur. When identified, these errors have been corrected.  While every attempt is made to correct errors during

## 2021-08-18 DIAGNOSIS — E66.9 OBESITY (BMI 30-39.9): ICD-10-CM

## 2021-08-19 RX ORDER — PHENTERMINE HYDROCHLORIDE 37.5 MG/1
TABLET ORAL
Qty: 30 TABLET | Refills: 0 | OUTPATIENT
Start: 2021-08-19

## 2021-09-10 RX ORDER — PAROXETINE 30 MG/1
30 TABLET, FILM COATED ORAL EVERY MORNING
Qty: 90 TABLET | Refills: 1 | Status: SHIPPED | OUTPATIENT
Start: 2021-09-10 | End: 2022-02-24

## 2021-09-10 NOTE — TELEPHONE ENCOUNTER
Refill passed per [address] [place] protocol.      Requested Prescriptions   Pending Prescriptions Disp Refills    PAROXETINE 30 MG Oral Tab [Pharmacy Med Name: PAROXETINE 30MG TABLETS] 90 tablet 0     Sig: TAKE 1 TABLET(30 MG) BY MOUTH EVERY MORNING        Psychiatric Non-Scheduled (Anti-Anxiety) Passed - 9/10/2021 11:28 AM        Passed - Appointment in last 6 or next 3 months               Future Appointments         Provider Department Appt Notes    Tomorrow Shante Whalen, TELECARE [facility] on the inside of my mouth    In 2 months Rohith Lagos MD [address], [address], [place] [employer]  NON SX F/U             Recent Outpatient Visits              Today Binge eating disorder    Lorenza Lal MD    Office Visit    1 month ago Submental lymphadenopathy    [facility], [address], Shadi Serrano MD    Office Visit    1 month ago Submental lymphadenopathy    [facility], [address], Shadi Serrano MD    Office Visit    1 month ago Alkaline phosphatase elevation    Dior Ruvalcaba, Liset Taylor MD    Telemedicine    1 month ago Leo Eubanks MD    [facility]

## 2021-09-10 NOTE — PROGRESS NOTES
[address]  Dept: [phone]       Patient:  Lin Phoenix  :      1975  MRN:      [id number]    Chief Complaint:  Patient presents wi MG, 20 MCG/24HR Intrauterine IUD   0   • Multiple Vitamin (MULTIVITAMINS) Oral Cap Take by mouth.          Allergies:  Amoxicillin, Potassium, and Vancomycin     Social History:    Social History    Socioeconomic History      Marital status: Single      Spo Stress:       Feeling of Stress :   Social Connections:       Frequency of Communication with Friends and Family:       Frequency of Social Gatherings with Friends and Family:       Attends Gnosticist Services:       Active Member of Clubs or Organization minutes before or after meals, Utlize portion control strategies to reduce calorie intake, Identify triggers for eating and manage cues and Eat slowly and take 20 to 30 minutes to complete each meal    Exercise Goals Reviewed and Discussed    Continue to w 08:46 AM       MATILDE: should improve with weight loss      GERD: The patient believes her reflux is somewhat better of at least no worse on current medication.  She was encouraged to avoid caffeine products, elevated head of bed and not eat for 1 hour before

## 2021-09-11 ENCOUNTER — OFFICE VISIT (OUTPATIENT)
Dept: FAMILY MEDICINE CLINIC | Facility: CLINIC | Age: 46
End: 2021-09-11
Payer: COMMERCIAL

## 2021-09-11 VITALS
WEIGHT: 209 LBS | HEIGHT: 63.9 IN | SYSTOLIC BLOOD PRESSURE: 108 MMHG | DIASTOLIC BLOOD PRESSURE: 75 MMHG | RESPIRATION RATE: 18 BRPM | BODY MASS INDEX: 36.12 KG/M2 | HEART RATE: 103 BPM | OXYGEN SATURATION: 97 %

## 2021-09-11 DIAGNOSIS — K13.70 MOUTH LESION: Primary | ICD-10-CM

## 2021-09-11 PROCEDURE — 3078F DIAST BP <80 MM HG: CPT | Performed by: PHYSICIAN ASSISTANT

## 2021-09-11 PROCEDURE — 3008F BODY MASS INDEX DOCD: CPT | Performed by: PHYSICIAN ASSISTANT

## 2021-09-11 PROCEDURE — 99213 OFFICE O/P EST LOW 20 MIN: CPT | Performed by: PHYSICIAN ASSISTANT

## 2021-09-11 PROCEDURE — 3074F SYST BP LT 130 MM HG: CPT | Performed by: PHYSICIAN ASSISTANT

## 2021-09-11 NOTE — PROGRESS NOTES
HPI:    Patient ID: Jg Payne is a 54year old female. Patient presents with lesion in mouth for the past 3 weeks. No pain or draining noted. Started out as small lesions and then grew.  They then became infected because she would bite on the lesio noted.    Neurological:      Mental Status: She is alert and oriented to person, place, and time. ASSESSMENT/PLAN:   1.  Mouth lesion  -After discussion with patient, advised the following:  -Told to take keflex  -Educated pt on how to take t

## 2021-11-02 RX ORDER — HYDROCHLOROTHIAZIDE 12.5 MG/1
CAPSULE, GELATIN COATED ORAL
Qty: 90 CAPSULE | Refills: 0 | Status: SHIPPED | OUTPATIENT
Start: 2021-11-02

## 2021-11-11 ENCOUNTER — TELEPHONE (OUTPATIENT)
Dept: FAMILY MEDICINE CLINIC | Facility: CLINIC | Age: 46
End: 2021-11-11

## 2021-11-11 DIAGNOSIS — E78.00 HYPERCHOLESTEREMIA: ICD-10-CM

## 2021-11-11 DIAGNOSIS — Z82.49 FAMILY HISTORY OF VALVULAR HEART DISEASE: Primary | ICD-10-CM

## 2021-11-11 RX ORDER — ATORVASTATIN CALCIUM 10 MG/1
10 TABLET, FILM COATED ORAL NIGHTLY
Qty: 30 TABLET | Refills: 0 | Status: SHIPPED | OUTPATIENT
Start: 2021-11-11 | End: 2021-11-11

## 2021-11-11 RX ORDER — ATORVASTATIN CALCIUM 10 MG/1
10 TABLET, FILM COATED ORAL NIGHTLY
Qty: 90 TABLET | Refills: 0 | Status: SHIPPED | OUTPATIENT
Start: 2021-11-11

## 2021-11-11 NOTE — TELEPHONE ENCOUNTER
Per patient her son was diagnosed with sub arotic valve dysfunction and per his cardiologist this could be heredity. Patient was told by his cardiologist she should get an echocardiogram and follow up with her PCP.      Patient is asking if she should have

## 2021-11-11 NOTE — TELEPHONE ENCOUNTER
Patient states she is still taking the medication and would like to have a refill.      Please review Protocol Failed/No Protocol        Requested Prescriptions   Pending Prescriptions Disp Refills    ATORVASTATIN 10 MG Oral Tab [Pharmacy Med Name: ATORVASTATIN 10MG TABLETS] 90 tablet 1     Sig: TAKE 1 TABLET(10 MG) BY MOUTH EVERY NIGHT        Cholesterol Medication Protocol Failed - 11/11/2021  1:29 PM        Failed - Last LDL < 130     Lab Results   Component Value Date     (H) 05/18/2021               Passed - ALT in past 12 months        Passed - LDL in past 12 months        Passed - Last ALT < 80       Lab Results   Component Value Date    ALT 28 05/18/2021             Passed - Appointment in past 12 or next 3 months              Future Appointments         Provider Department Appt Notes    In 3 weeks MD Keyona Ibarra , [place] [facility] PPO  NON SX F/U            Recent Outpatient Visits              2 months ago Mouth lesion    [address] [place], [address] [place], [place]    Office Visit    2 months ago Binge eating disorder    Lorenza Lal MD    Office Visit    3 months ago Submental lymphadenopathy    [facility], [address], Shadi Serrano MD    Office Visit    3 months ago Submental lymphadenopathy    [facility], [address], Shadi Serrano MD    Office Visit    3 months ago Alkaline phosphatase elevation    Gio Abdi MD    Telemedicine

## 2021-11-11 NOTE — TELEPHONE ENCOUNTER
Please review Protocol Failed/No Protocol        Requested Prescriptions   Pending Prescriptions Disp Refills    ATORVASTATIN 10 MG Oral Tab [Pharmacy Med Name: ATORVASTATIN 10MG TABLETS] 90 tablet 0     Sig: TAKE 1 TABLET(10 MG) BY MOUTH EVERY NIGHT

## 2021-11-12 NOTE — TELEPHONE ENCOUNTER
Advised son Israel Herrmann Jr.(on hipaa) and patient of Dr. Nance Mealing note and number given to scheduling. Son and patient verbalized understanding.

## 2021-11-14 DIAGNOSIS — R73.03 PRE-DIABETES: ICD-10-CM

## 2021-11-15 RX ORDER — METFORMIN HYDROCHLORIDE 500 MG/1
TABLET, EXTENDED RELEASE ORAL
Qty: 90 TABLET | Refills: 0 | Status: SHIPPED | OUTPATIENT
Start: 2021-11-15

## 2021-11-18 NOTE — TELEPHONE ENCOUNTER
Spoke with the patient and informed her of the message below.  Pt states that she will schedule an appointment thru her  my chart

## 2021-12-06 ENCOUNTER — OFFICE VISIT (OUTPATIENT)
Dept: SURGERY | Facility: CLINIC | Age: 46
End: 2021-12-06
Payer: COMMERCIAL

## 2021-12-06 VITALS
OXYGEN SATURATION: 99 % | HEART RATE: 88 BPM | DIASTOLIC BLOOD PRESSURE: 83 MMHG | HEIGHT: 63.9 IN | SYSTOLIC BLOOD PRESSURE: 135 MMHG | BODY MASS INDEX: 36.71 KG/M2 | WEIGHT: 212.38 LBS

## 2021-12-06 DIAGNOSIS — E78.00 HYPERCHOLESTEREMIA: ICD-10-CM

## 2021-12-06 DIAGNOSIS — F50.81 BINGE EATING DISORDER: ICD-10-CM

## 2021-12-06 DIAGNOSIS — Z51.81 ENCOUNTER FOR THERAPEUTIC DRUG MONITORING: ICD-10-CM

## 2021-12-06 DIAGNOSIS — R73.03 PRE-DIABETES: Primary | ICD-10-CM

## 2021-12-06 DIAGNOSIS — E66.9 OBESITY (BMI 30-39.9): ICD-10-CM

## 2021-12-06 DIAGNOSIS — G47.33 OSA (OBSTRUCTIVE SLEEP APNEA): ICD-10-CM

## 2021-12-06 PROCEDURE — 99214 OFFICE O/P EST MOD 30 MIN: CPT | Performed by: INTERNAL MEDICINE

## 2021-12-06 PROCEDURE — 3079F DIAST BP 80-89 MM HG: CPT | Performed by: INTERNAL MEDICINE

## 2021-12-06 PROCEDURE — 3008F BODY MASS INDEX DOCD: CPT | Performed by: INTERNAL MEDICINE

## 2021-12-06 PROCEDURE — 3075F SYST BP GE 130 - 139MM HG: CPT | Performed by: INTERNAL MEDICINE

## 2021-12-06 RX ORDER — PHENTERMINE HYDROCHLORIDE 37.5 MG/1
TABLET ORAL
Qty: 30 TABLET | Refills: 2 | Status: SHIPPED | OUTPATIENT
Start: 2021-12-06

## 2021-12-06 RX ORDER — TOPIRAMATE 25 MG/1
25 TABLET ORAL EVERY EVENING
Qty: 30 TABLET | Refills: 2 | Status: SHIPPED | OUTPATIENT
Start: 2021-12-06

## 2021-12-06 NOTE — PROGRESS NOTES
[address]  Dept: [phone]       Patient:  Shala Ram  :      1975  MRN:      [id number]    Chief Complaint:  Patient presents wi Take 1 tablet (10 mg total) by mouth nightly. 90 tablet 0   • HYDROCHLOROTHIAZIDE 12.5 MG Oral Cap TAKE 1 CAPSULE(12.5 MG) BY MOUTH DAILY 90 capsule 0   • PARoxetine 30 MG Oral Tab Take 1 tablet (30 mg total) by mouth every morning.  90 tablet 1   • [facility]  Financial Resource Strain: Not on file  Food Insecurity: Not on file  Transportation Needs: Not on file  Physical Activity: Not on file  Stress: Not on file  Social Connections: Not on file  Intimate Partner Violence: Not on file  Housing Stability meal    Exercise Goals Reviewed and Discussed    Continue to walk and do weights    ROS:    Constitutional: positive for fatigue  Respiratory: negative  Cardiovascular: negative  Gastrointestinal: positive for constipation and reflux symptoms  Musculoskele current medication. She was encouraged to avoid caffeine products, elevated head of bed and not eat for 1 hour before bedtime. No interval changes were made in her anti-reflux medications.      Pre diabetes: will restart metformin       Goals for next month

## 2021-12-17 ENCOUNTER — EKG ENCOUNTER (OUTPATIENT)
Dept: LAB | Age: 46
End: 2021-12-17
Attending: INTERNAL MEDICINE
Payer: COMMERCIAL

## 2021-12-17 DIAGNOSIS — Z51.81 ENCOUNTER FOR THERAPEUTIC DRUG MONITORING: ICD-10-CM

## 2021-12-17 DIAGNOSIS — E66.9 OBESITY (BMI 30-39.9): ICD-10-CM

## 2021-12-17 DIAGNOSIS — R73.03 PRE-DIABETES: ICD-10-CM

## 2021-12-17 DIAGNOSIS — F50.81 BINGE EATING DISORDER: ICD-10-CM

## 2021-12-17 DIAGNOSIS — G47.33 OSA (OBSTRUCTIVE SLEEP APNEA): ICD-10-CM

## 2021-12-17 DIAGNOSIS — E78.00 HYPERCHOLESTEREMIA: ICD-10-CM

## 2021-12-17 PROCEDURE — 93005 ELECTROCARDIOGRAM TRACING: CPT

## 2021-12-17 PROCEDURE — 93010 ELECTROCARDIOGRAM REPORT: CPT | Performed by: INTERNAL MEDICINE

## 2022-02-15 ENCOUNTER — TELEPHONE (OUTPATIENT)
Dept: FAMILY MEDICINE CLINIC | Facility: CLINIC | Age: 47
End: 2022-02-15

## 2022-02-15 NOTE — TELEPHONE ENCOUNTER
Virtual appointment made for tomorrow  2/16/22. The patient stated that her son's cardiologist stated he has a subaortic valve and bicuspid bridge. The cardiologist stated can be hereditary and stated she should be checked. She is asking for a test ordered that needs to be done to find out if she also has the defect.

## 2022-02-16 ENCOUNTER — TELEMEDICINE (OUTPATIENT)
Dept: FAMILY MEDICINE CLINIC | Facility: CLINIC | Age: 47
End: 2022-02-16
Payer: COMMERCIAL

## 2022-02-16 VITALS — HEIGHT: 63.9 IN | WEIGHT: 210 LBS | BODY MASS INDEX: 36.29 KG/M2

## 2022-02-16 DIAGNOSIS — Z82.79 FAMILY HISTORY OF CONGENITAL HEART DISEASE: Primary | ICD-10-CM

## 2022-02-16 DIAGNOSIS — E66.9 OBESITY (BMI 30-39.9): ICD-10-CM

## 2022-02-16 DIAGNOSIS — K59.09 CHRONIC CONSTIPATION: ICD-10-CM

## 2022-02-16 DIAGNOSIS — K21.9 GASTROESOPHAGEAL REFLUX DISEASE, UNSPECIFIED WHETHER ESOPHAGITIS PRESENT: ICD-10-CM

## 2022-02-16 PROCEDURE — 99214 OFFICE O/P EST MOD 30 MIN: CPT | Performed by: FAMILY MEDICINE

## 2022-02-16 PROCEDURE — 3008F BODY MASS INDEX DOCD: CPT | Performed by: FAMILY MEDICINE

## 2022-02-24 RX ORDER — TOPIRAMATE 25 MG/1
TABLET ORAL
Qty: 30 TABLET | Refills: 2 | Status: SHIPPED | OUTPATIENT
Start: 2022-02-24

## 2022-02-24 RX ORDER — PAROXETINE 30 MG/1
TABLET, FILM COATED ORAL
Qty: 90 TABLET | Refills: 1 | Status: SHIPPED | OUTPATIENT
Start: 2022-02-24

## 2022-02-24 RX ORDER — TOPIRAMATE 25 MG/1
TABLET ORAL
Qty: 30 TABLET | Refills: 2 | Status: CANCELLED | OUTPATIENT
Start: 2022-02-24

## 2022-02-24 NOTE — TELEPHONE ENCOUNTER
Paroxetine Refill passed per [address] protocol. Requested Prescriptions   Pending Prescriptions Disp Refills    PAROXETINE 30 MG Oral Tab [Pharmacy Med Name: PAROXETINE 30MG TABLETS] 90 tablet 1     Sig: TAKE 1 TABLET(30 MG) BY MOUTH EVERY MORNING        Psychiatric Non-Scheduled (Anti-Anxiety) Passed - 2/24/2022  5:44 AM        Passed - Appointment in last 6 or next 3 months           ATORVASTATIN 10 MG Oral Tab [Pharmacy Med Name: ATORVASTATIN 10MG TABLETS] 90 tablet 0     Sig: TAKE 1 TABLET(10 MG) BY MOUTH EVERY NIGHT        Cholesterol Medication Protocol Failed - 2/24/2022  5:44 AM        Failed - Last LDL < 130     Lab Results   Component Value Date     (H) 05/18/2021               Passed - ALT in past 12 months        Passed - LDL in past 12 months        Passed - Last ALT < 80       Lab Results   Component Value Date    ALT 28 05/18/2021             Passed - Appointment in past 12 or next 3 months            Recent Outpatient Visits              1 week ago Family history of congenital heart disease    [facility], [facility] Holley Garner MD    Telemedicine    2 months ago Pre-diabetes    Norman Bravo MD    Office Visit    5 months ago Mouth lesion    [address] [facility], [address] [place], [place] [address]etienne [place], [place]    Office Visit    5 months ago Binge eating disorder    Norman Bravo MD    Office Visit    6 months ago Submental lymphadenopathy    [facility] [place], [address] [facility],[address], Celso Parra MD    Office Visit          Future Appointments         Provider Department Appt Notes    In 1 week Alvarez Garcia MD [facility],[address], [place] [employer] PPO  NON SX F/U    In 2 months Baldomero Rivas  [facility] Been vomiting at least 5-6 days  a week .  Acid reflux is out of control

## 2022-02-25 RX ORDER — ATORVASTATIN CALCIUM 10 MG/1
10 TABLET, FILM COATED ORAL NIGHTLY
Qty: 90 TABLET | Refills: 0 | Status: SHIPPED | OUTPATIENT
Start: 2022-02-25

## 2022-03-02 ENCOUNTER — HOSPITAL ENCOUNTER (OUTPATIENT)
Dept: CV DIAGNOSTICS | Facility: HOSPITAL | Age: 47
Discharge: HOME | End: 2022-03-02
Attending: FAMILY MEDICINE
Payer: COMMERCIAL

## 2022-03-02 DIAGNOSIS — Z82.79 FAMILY HISTORY OF CONGENITAL HEART DISEASE: ICD-10-CM

## 2022-03-02 PROCEDURE — 93306 TTE W/DOPPLER COMPLETE: CPT | Performed by: FAMILY MEDICINE

## 2022-03-04 ENCOUNTER — TELEPHONE (OUTPATIENT)
Dept: CASE MANAGEMENT | Age: 47
End: 2022-03-04

## 2022-03-04 ENCOUNTER — TELEPHONE (OUTPATIENT)
Dept: FAMILY MEDICINE CLINIC | Facility: CLINIC | Age: 47
End: 2022-03-04

## 2022-03-04 NOTE — TELEPHONE ENCOUNTER
Amy Colunga,    Your request for pt Card Echo 2D doppler (54403) has been denied by the health plan. Pt had procedure done on 3.2.22. Pt was advised to reschedule.       Please do a peer to peer on behalf of the patient   Ph. [phone]  Order # [id number]  Tracking # [facility] [id number]      Please and Thank you,  Tiffany Powers

## 2022-03-04 NOTE — TELEPHONE ENCOUNTER
Pt viewed Echo results in [address] Pt would like further explanation. Pt read, \"No previous study was available for comparison. Grade I diastolic   dysfunction now present, otherwise no significant change. \"    Informed Pt results has not been viewed by Dr. Ibrahima Elizabeth as of today. Pt would like a phone call from nurse or Dr. Ibrahima Elizabeth instead of reading result notes on MyChart because it has to do with her heart. Pt denies chest pain or shortness of breath at this time. Please advise.

## 2022-03-05 RX ORDER — LEVOTHYROXINE SODIUM 88 UG/1
88 TABLET ORAL DAILY
Qty: 90 TABLET | Refills: 0 | Status: SHIPPED | OUTPATIENT
Start: 2022-03-05

## 2022-03-05 NOTE — TELEPHONE ENCOUNTER
Called patient and review test results. Patient feels reassured. Recommend to continue working on healthy diet and exercise and weight loss. Patient also requested refill on thyroid medication. Last blood test was in May 2021 and was normal.  Patient due for physical in May. Medication sent to pharmacy. Also inform patient that echo test was denied by insurance.   Will call Monday for [id number]

## 2022-03-08 NOTE — TELEPHONE ENCOUNTER
The [employer] and did [id number].   Echocardiogram was authorized and valid from March 1, 2022 till April 29, 2022  Authorization number is [id number]    Please call to inform patient that echocardiogram was approved

## 2022-03-10 ENCOUNTER — TELEPHONE (OUTPATIENT)
Dept: FAMILY MEDICINE CLINIC | Facility: CLINIC | Age: 47
End: 2022-03-10

## 2022-03-10 NOTE — TELEPHONE ENCOUNTER
Spoke with pt,  verified. Pt was informed of below recommendation, pt stated understanding. Pt stated her 2d echo was already done on 3-2-22, see result. See TE 3-4-22.

## 2022-05-03 DIAGNOSIS — E66.9 OBESITY (BMI 30-39.9): ICD-10-CM

## 2022-05-03 RX ORDER — PHENTERMINE HYDROCHLORIDE 37.5 MG/1
TABLET ORAL
Qty: 30 TABLET | Refills: 0 | Status: SHIPPED | OUTPATIENT
Start: 2022-05-03

## 2022-05-03 RX ORDER — PHENTERMINE HYDROCHLORIDE 37.5 MG/1
TABLET ORAL
Qty: 30 TABLET | Refills: 0 | OUTPATIENT
Start: 2022-05-03

## 2022-05-05 ENCOUNTER — OFFICE VISIT (OUTPATIENT)
Dept: GASTROENTEROLOGY | Facility: CLINIC | Age: 47
End: 2022-05-05
Payer: COMMERCIAL

## 2022-05-05 ENCOUNTER — TELEPHONE (OUTPATIENT)
Dept: GASTROENTEROLOGY | Facility: CLINIC | Age: 47
End: 2022-05-05

## 2022-05-05 VITALS
HEIGHT: 63.5 IN | BODY MASS INDEX: 38.15 KG/M2 | DIASTOLIC BLOOD PRESSURE: 76 MMHG | SYSTOLIC BLOOD PRESSURE: 116 MMHG | WEIGHT: 218 LBS

## 2022-05-05 DIAGNOSIS — K21.9 GASTROESOPHAGEAL REFLUX DISEASE, UNSPECIFIED WHETHER ESOPHAGITIS PRESENT: Primary | ICD-10-CM

## 2022-05-05 DIAGNOSIS — K59.02 DYSSYNERGIC DEFECATION: ICD-10-CM

## 2022-05-05 DIAGNOSIS — K59.00 CONSTIPATION, UNSPECIFIED CONSTIPATION TYPE: ICD-10-CM

## 2022-05-05 PROCEDURE — 3074F SYST BP LT 130 MM HG: CPT | Performed by: INTERNAL MEDICINE

## 2022-05-05 PROCEDURE — 3078F DIAST BP <80 MM HG: CPT | Performed by: INTERNAL MEDICINE

## 2022-05-05 PROCEDURE — 3008F BODY MASS INDEX DOCD: CPT | Performed by: INTERNAL MEDICINE

## 2022-05-05 PROCEDURE — 99214 OFFICE O/P EST MOD 30 MIN: CPT | Performed by: INTERNAL MEDICINE

## 2022-05-05 RX ORDER — LUBIPROSTONE 8 UG/1
8 CAPSULE, GELATIN COATED ORAL 2 TIMES DAILY WITH MEALS
Qty: 60 CAPSULE | Refills: 5 | Status: SHIPPED | OUTPATIENT
Start: 2022-05-05

## 2022-05-05 NOTE — TELEPHONE ENCOUNTER
Scheduled for:  EGD 39151  Provider Name:  Dr. Tu Duffy  Date:  6/29/2022  [facility]  Sedation:  MAC  Time:  11:45 am, (pt is aware that [facility] will call the day before to confirm arrival time)  Prep:  NPO after midnight  Meds/Allergies Reconciled?:  Physician reviewed  Diagnosis with codes:  GERD K21.9  Was patient informed to call insurance with codes (Y/N):  Yes  Referral sent?:  Yes  [address] [place] [address] or [address] [place] [address] notified?:  Electronic case request was sent to [facility] via [employer]. Medication Orders:  Hold your phentermine 1 week prior to the procedure  Hold your metformin the day of the procedure  Misc Orders:  Patient was informed that they will need a COVID 19 test prior to their procedure. Patient verbally understood & will await a phone call from [facility] to schedule. Further instructions given by staff:  I discussed the prep instructions with the patient which she verbally understood and provided patient with prep instruction sheet.

## 2022-05-05 NOTE — PATIENT INSTRUCTIONS
Endoscopy  1. Schedule upper endoscopy (EGD) with monitored anesthesia care (MAC)    Hold your phentermine 1 week prior to the procedure  Hold your metformin the day of the procedure    2. You will also need to get tested for COVID within 72 hours prior to the procedure. You should be contacted regarding the instructions for this. Acid reflux  1. Continue your current medication. We will likely make changes after your upper endoscopy    2. Continue to work on weight loss with Dr. Naila Ross  1. Make an appointment with the pelvic floor clinic at [place]    2. Work on the colon clean out    3.  After the clean out, start the lubiprostone I prescribed to your pharmacy

## 2022-05-25 DIAGNOSIS — E03.9 HYPOTHYROIDISM, UNSPECIFIED TYPE: ICD-10-CM

## 2022-05-25 DIAGNOSIS — R73.03 PRE-DIABETES: ICD-10-CM

## 2022-05-25 DIAGNOSIS — E78.00 HYPERCHOLESTEREMIA: ICD-10-CM

## 2022-05-25 RX ORDER — ATORVASTATIN CALCIUM 10 MG/1
10 TABLET, FILM COATED ORAL NIGHTLY
Qty: 30 TABLET | Refills: 0 | Status: SHIPPED | OUTPATIENT
Start: 2022-05-25 | End: 2022-06-26

## 2022-05-25 RX ORDER — TOPIRAMATE 25 MG/1
TABLET ORAL
Qty: 30 TABLET | Refills: 2 | Status: SHIPPED | OUTPATIENT
Start: 2022-05-25

## 2022-05-25 RX ORDER — METFORMIN HYDROCHLORIDE 500 MG/1
TABLET, EXTENDED RELEASE ORAL
Qty: 90 TABLET | Refills: 0 | Status: SHIPPED | OUTPATIENT
Start: 2022-05-25

## 2022-05-25 RX ORDER — LEVOTHYROXINE SODIUM 88 UG/1
88 TABLET ORAL DAILY
Qty: 30 TABLET | Refills: 0 | Status: SHIPPED | OUTPATIENT
Start: 2022-05-25 | End: 2022-06-26 | Stop reason: DRUGHIGH

## 2022-05-25 NOTE — TELEPHONE ENCOUNTER
Please review. Protocol failed or has no protocol.     Requested Prescriptions   Pending Prescriptions Disp Refills    ATORVASTATIN 10 MG Oral Tab [Pharmacy Med Name: ATORVASTATIN 10MG TABLETS] 90 tablet 0     Sig: TAKE 1 TABLET(10 MG) BY MOUTH EVERY NIGHT        Cholesterol Medication Protocol Failed - 5/25/2022  6:00 AM        Failed - ALT in past 12 months        Failed - LDL in past 12 months        Failed - Last ALT < 80       Lab Results   Component Value Date    ALT 28 05/18/2021             Failed - Last LDL < 130     Lab Results   Component Value Date     (H) 05/18/2021               Passed - Appointment in past 12 or next 3 months           LEVOTHYROXINE 88 MCG Oral Tab [Pharmacy Med Name: LEVOTHYROXINE 0.088MG (88MCG) TAB] 90 tablet 0     Sig: TAKE 1 TABLET(88 MCG) BY MOUTH DAILY        Thyroid Medication Protocol Failed - 5/25/2022  6:00 AM        Failed - TSH in past 12 months        Passed - Last TSH value is normal     Lab Results   Component Value Date    TSH 1.030 05/18/2021    THYROIDFUNC 19.58 (H) 09/10/2014                 Passed - Appointment in past 12 or next 3 months              Recent Outpatient Visits              2 weeks ago Gastroesophageal reflux disease, unspecified whether esophagitis present    [facility], [facility], Mali Abdi MD    Office Visit    3 months ago Family history of congenital heart disease    [address] [place], [place] [address]Suzanne MD    Telemedicine    5 months ago Nakul Sapp MD    Office Visit    8 months ago Mouth lesion    [address] [place]    Office Visit    8 months ago Binge eating disorder    [facility],2[address] Floor, Latoya Borrero MD    Office Visit            Future Appointments         Provider Department Appt Notes    In 1 month LUZ, PROCEDURE [address] GI PROCEDURE IDANIA @ [address]

## 2022-05-26 NOTE — TELEPHONE ENCOUNTER
1st attempt - [employer] message sent to patient to contact the office to schedule appointment.

## 2022-05-27 RX ORDER — LEVOTHYROXINE SODIUM 88 UG/1
TABLET ORAL
Qty: 90 TABLET | Refills: 0 | OUTPATIENT
Start: 2022-05-27

## 2022-05-27 RX ORDER — ATORVASTATIN CALCIUM 10 MG/1
TABLET, FILM COATED ORAL
Qty: 90 TABLET | Refills: 0 | OUTPATIENT
Start: 2022-05-27

## 2022-06-13 ENCOUNTER — OFFICE VISIT (OUTPATIENT)
Dept: FAMILY MEDICINE CLINIC | Facility: CLINIC | Age: 47
End: 2022-06-13
Payer: COMMERCIAL

## 2022-06-13 VITALS
SYSTOLIC BLOOD PRESSURE: 120 MMHG | WEIGHT: 221 LBS | HEIGHT: 63.5 IN | DIASTOLIC BLOOD PRESSURE: 82 MMHG | BODY MASS INDEX: 38.67 KG/M2 | HEART RATE: 88 BPM | TEMPERATURE: 97 F

## 2022-06-13 DIAGNOSIS — E03.9 HYPOTHYROIDISM, UNSPECIFIED TYPE: ICD-10-CM

## 2022-06-13 DIAGNOSIS — F50.81 BINGE EATING DISORDER: ICD-10-CM

## 2022-06-13 DIAGNOSIS — F41.9 ANXIETY AND DEPRESSION: ICD-10-CM

## 2022-06-13 DIAGNOSIS — Z12.31 ENCOUNTER FOR SCREENING MAMMOGRAM FOR BREAST CANCER: ICD-10-CM

## 2022-06-13 DIAGNOSIS — E78.00 HYPERCHOLESTEREMIA: Primary | ICD-10-CM

## 2022-06-13 DIAGNOSIS — R73.03 PRE-DIABETES: ICD-10-CM

## 2022-06-13 DIAGNOSIS — F32.A ANXIETY AND DEPRESSION: ICD-10-CM

## 2022-06-13 DIAGNOSIS — E66.9 OBESITY (BMI 30-39.9): ICD-10-CM

## 2022-06-13 DIAGNOSIS — K59.09 CHRONIC CONSTIPATION: ICD-10-CM

## 2022-06-13 PROCEDURE — 99214 OFFICE O/P EST MOD 30 MIN: CPT | Performed by: FAMILY MEDICINE

## 2022-06-13 PROCEDURE — 3079F DIAST BP 80-89 MM HG: CPT | Performed by: FAMILY MEDICINE

## 2022-06-13 PROCEDURE — 3074F SYST BP LT 130 MM HG: CPT | Performed by: FAMILY MEDICINE

## 2022-06-13 PROCEDURE — 3008F BODY MASS INDEX DOCD: CPT | Performed by: FAMILY MEDICINE

## 2022-06-13 RX ORDER — PAROXETINE HYDROCHLORIDE 40 MG/1
40 TABLET, FILM COATED ORAL EVERY MORNING
Qty: 90 TABLET | Refills: 0 | Status: SHIPPED | OUTPATIENT
Start: 2022-06-13

## 2022-06-20 ENCOUNTER — LAB ENCOUNTER (OUTPATIENT)
Dept: LAB | Age: 47
End: 2022-06-20
Attending: FAMILY MEDICINE
Payer: COMMERCIAL

## 2022-06-20 DIAGNOSIS — R73.03 PRE-DIABETES: ICD-10-CM

## 2022-06-20 DIAGNOSIS — E78.00 HYPERCHOLESTEREMIA: ICD-10-CM

## 2022-06-20 DIAGNOSIS — E66.9 OBESITY (BMI 30-39.9): ICD-10-CM

## 2022-06-20 DIAGNOSIS — E03.9 HYPOTHYROIDISM, UNSPECIFIED TYPE: ICD-10-CM

## 2022-06-20 LAB
ALBUMIN SERPL-MCNC: 3.7 G/DL (ref 3.4–5)
ALBUMIN/GLOB SERPL: 0.9 {RATIO} (ref 1–2)
ALP LIVER SERPL-CCNC: 156 U/L
ALT SERPL-CCNC: 46 U/L
ANION GAP SERPL CALC-SCNC: 4 MMOL/L (ref 0–18)
AST SERPL-CCNC: 15 U/L (ref 15–37)
BASOPHILS # BLD AUTO: 0.05 X10(3) UL (ref 0–0.2)
BASOPHILS NFR BLD AUTO: 0.6 %
BILIRUB SERPL-MCNC: 0.3 MG/DL (ref 0.1–2)
BUN BLD-MCNC: 12 MG/DL (ref 7–18)
BUN/CREAT SERPL: 13.5 (ref 10–20)
CALCIUM BLD-MCNC: 9.3 MG/DL (ref 8.5–10.1)
CHLORIDE SERPL-SCNC: 104 MMOL/L (ref 98–112)
CHOLEST SERPL-MCNC: 155 MG/DL (ref ?–200)
CO2 SERPL-SCNC: 30 MMOL/L (ref 21–32)
CREAT BLD-MCNC: 0.89 MG/DL
DEPRECATED RDW RBC AUTO: 42.2 FL (ref 35.1–46.3)
EOSINOPHIL # BLD AUTO: 0.14 X10(3) UL (ref 0–0.7)
EOSINOPHIL NFR BLD AUTO: 1.7 %
ERYTHROCYTE [DISTWIDTH] IN BLOOD BY AUTOMATED COUNT: 13.2 % (ref 11–15)
EST. AVERAGE GLUCOSE BLD GHB EST-MCNC: 137 MG/DL (ref 68–126)
FASTING PATIENT LIPID ANSWER: YES
FASTING STATUS PATIENT QL REPORTED: YES
GLOBULIN PLAS-MCNC: 4 G/DL (ref 2.8–4.4)
GLUCOSE BLD-MCNC: 126 MG/DL (ref 70–99)
HBA1C MFR BLD: 6.4 % (ref ?–5.7)
HCT VFR BLD AUTO: 42.2 %
HDLC SERPL-MCNC: 49 MG/DL (ref 40–59)
HGB BLD-MCNC: 13.8 G/DL
IMM GRANULOCYTES # BLD AUTO: 0.03 X10(3) UL (ref 0–1)
IMM GRANULOCYTES NFR BLD: 0.4 %
LDLC SERPL CALC-MCNC: 87 MG/DL (ref ?–100)
LYMPHOCYTES # BLD AUTO: 2.07 X10(3) UL (ref 1–4)
LYMPHOCYTES NFR BLD AUTO: 24.4 %
MCH RBC QN AUTO: 28.8 PG (ref 26–34)
MCHC RBC AUTO-ENTMCNC: 32.7 G/DL (ref 31–37)
MCV RBC AUTO: 88.1 FL
MONOCYTES # BLD AUTO: 0.68 X10(3) UL (ref 0.1–1)
MONOCYTES NFR BLD AUTO: 8 %
NEUTROPHILS # BLD AUTO: 5.51 X10 (3) UL (ref 1.5–7.7)
NEUTROPHILS # BLD AUTO: 5.51 X10(3) UL (ref 1.5–7.7)
NEUTROPHILS NFR BLD AUTO: 64.9 %
NONHDLC SERPL-MCNC: 106 MG/DL (ref ?–130)
OSMOLALITY SERPL CALC.SUM OF ELEC: 287 MOSM/KG (ref 275–295)
PLATELET # BLD AUTO: 389 10(3)UL (ref 150–450)
POTASSIUM SERPL-SCNC: 4.1 MMOL/L (ref 3.5–5.1)
PROT SERPL-MCNC: 7.7 G/DL (ref 6.4–8.2)
RBC # BLD AUTO: 4.79 X10(6)UL
SODIUM SERPL-SCNC: 138 MMOL/L (ref 136–145)
T3FREE SERPL-MCNC: 2.64 PG/ML (ref 2.4–4.2)
T4 FREE SERPL-MCNC: 1.3 NG/DL (ref 0.8–1.7)
TRIGL SERPL-MCNC: 104 MG/DL (ref 30–149)
TSI SER-ACNC: 0.04 MIU/ML (ref 0.36–3.74)
VLDLC SERPL CALC-MCNC: 17 MG/DL (ref 0–30)
WBC # BLD AUTO: 8.5 X10(3) UL (ref 4–11)

## 2022-06-20 PROCEDURE — 84443 ASSAY THYROID STIM HORMONE: CPT

## 2022-06-20 PROCEDURE — 85025 COMPLETE CBC W/AUTO DIFF WBC: CPT

## 2022-06-20 PROCEDURE — 83036 HEMOGLOBIN GLYCOSYLATED A1C: CPT

## 2022-06-20 PROCEDURE — 84439 ASSAY OF FREE THYROXINE: CPT

## 2022-06-20 PROCEDURE — 84481 FREE ASSAY (FT-3): CPT

## 2022-06-20 PROCEDURE — 36415 COLL VENOUS BLD VENIPUNCTURE: CPT

## 2022-06-20 PROCEDURE — 80061 LIPID PANEL: CPT

## 2022-06-20 PROCEDURE — 80053 COMPREHEN METABOLIC PANEL: CPT

## 2022-06-24 DIAGNOSIS — E03.9 HYPOTHYROIDISM, UNSPECIFIED TYPE: ICD-10-CM

## 2022-06-24 DIAGNOSIS — E78.00 HYPERCHOLESTEREMIA: ICD-10-CM

## 2022-06-26 DIAGNOSIS — E03.9 HYPOTHYROIDISM, UNSPECIFIED TYPE: Primary | ICD-10-CM

## 2022-06-26 DIAGNOSIS — E78.00 HYPERCHOLESTEREMIA: ICD-10-CM

## 2022-06-26 DIAGNOSIS — R73.03 PRE-DIABETES: ICD-10-CM

## 2022-06-26 RX ORDER — ATORVASTATIN CALCIUM 10 MG/1
10 TABLET, FILM COATED ORAL NIGHTLY
Qty: 90 TABLET | Refills: 3 | Status: SHIPPED | OUTPATIENT
Start: 2022-06-26

## 2022-06-26 RX ORDER — LEVOTHYROXINE SODIUM 0.07 MG/1
75 TABLET ORAL
Qty: 90 TABLET | Refills: 0 | Status: SHIPPED | OUTPATIENT
Start: 2022-06-26

## 2022-06-26 RX ORDER — ATORVASTATIN CALCIUM 10 MG/1
TABLET, FILM COATED ORAL
Qty: 90 TABLET | Refills: 0 | OUTPATIENT
Start: 2022-06-26

## 2022-06-26 RX ORDER — LEVOTHYROXINE SODIUM 88 UG/1
TABLET ORAL
Qty: 90 TABLET | Refills: 0 | OUTPATIENT
Start: 2022-06-26

## 2022-06-27 ENCOUNTER — LAB REQUISITION (OUTPATIENT)
Dept: SURGERY | Age: 47
End: 2022-06-27
Payer: COMMERCIAL

## 2022-06-27 DIAGNOSIS — Z01.818 PREOP EXAMINATION: ICD-10-CM

## 2022-06-27 LAB — SARS-COV-2 RNA RESP QL NAA+PROBE: NOT DETECTED

## 2022-06-29 ENCOUNTER — LAB REQUISITION (OUTPATIENT)
Dept: SURGERY | Age: 47
End: 2022-06-29
Payer: COMMERCIAL

## 2022-06-29 ENCOUNTER — SURGERY CENTER DOCUMENTATION (OUTPATIENT)
Dept: SURGERY | Age: 47
End: 2022-06-29

## 2022-06-29 ENCOUNTER — TELEPHONE (OUTPATIENT)
Dept: GASTROENTEROLOGY | Facility: CLINIC | Age: 47
End: 2022-06-29

## 2022-06-29 DIAGNOSIS — K21.9 GASTROESOPHAGEAL REFLUX DISEASE, UNSPECIFIED WHETHER ESOPHAGITIS PRESENT: ICD-10-CM

## 2022-06-29 DIAGNOSIS — K21.9 GASTROESOPHAGEAL REFLUX DISEASE: ICD-10-CM

## 2022-06-29 PROCEDURE — 88305 TISSUE EXAM BY PATHOLOGIST: CPT | Performed by: INTERNAL MEDICINE

## 2022-06-29 PROCEDURE — 88312 SPECIAL STAINS GROUP 1: CPT | Performed by: INTERNAL MEDICINE

## 2022-06-29 PROCEDURE — 43239 EGD BIOPSY SINGLE/MULTIPLE: CPT | Performed by: INTERNAL MEDICINE

## 2022-06-29 RX ORDER — OMEPRAZOLE 40 MG/1
40 CAPSULE, DELAYED RELEASE ORAL DAILY
Qty: 90 CAPSULE | Refills: 1 | Status: SHIPPED | OUTPATIENT
Start: 2022-06-29

## 2022-06-29 NOTE — PROCEDURES
Esophagogastroduodenoscopy (EGD) Report    Randa Vargas     1975 Age 51year old   PCP Bernard Plunkett MD Endoscopist Liz Marie MD     Date of procedure: 22    Procedure: EGD w/ biopsy     Pre-operative diagnosis: acid reflux    Post-operative diagnosis: esophagitis, hiatal herina    Sedation:  monitored anesthesia care (MAC)    Consent: We discussed the risks/benefits and alternatives to this procedure, as well as the planned sedation. Informed consent was obtained from the patient after the risks of the procedure were discussed, including but not limited to bleeding, perforation, aspiration, infection, or possibility of a missed lesion as well as the risks of anesthesia including but not limited to cardiopulmonary complications. The patient signed informed consent and elected to proceed with EGD with intervention [i.e. Biopsy, control of bleeding, dilatation, polypectomy, endoscopic mucosal resection, etc.] as indicated. EGD procedure: The patient was placed in the left lateral decubitus position and begun on continuous blood pressure pulse oximetry and EKG monitoring and this was maintained throughout the procedure. Once an adequate level of sedation was obtained a bite block was placed. Then the lubricated tip of the [id number] diagnostic video upper endoscope was carefully inserted and advanced using direct visualization into the posterior pharynx and ultimately into the esophagus, stomach, and duodenum. Air was then withdrawn and the endoscope was removed. The patient tolerated the procedure well. Estimated blood loss: insignificant    Specimens collected: esophageal biopsies    Complications: none    EGD findings:      1. Esophagus: The squamocolumnar junction was noted at 37 cm and appeared regular. There were two linear erosions in the distal esophagus and a superficial erosion/ulceration at the gastroesophageal junction. Multiple cold forceps biopsies were obtained.  The esophageal mucosa appeared unremarkable otherwise. 2. Stomach: There was a 2 cm sliding hiatal hernia. The stomach distended normally. Normal rugal folds were seen. The pylorus was patent. The gastric mucosa appeared unremarkable otherwise. Retroflexion revealed a normal fundus and cardia. 3. Duodenum: The duodenal mucosa appeared normal in the 1st and 2nd portion of the duodenum. Impression:  1. Esophagitis - LA grade B  2. Small hiatal hernia  3. Otherwise, unremarkable upper endsocopy    Recommend:  1. Await pathology results  2. Increase proton pump inhibitory therapy dose  3. Behavioral modifications as outlined in office visit from May    >>>If biopsies were performed and you have not received your pathology results either by phone or letter within 2 weeks, please call our office at [phone].     Jessica Mancuso MD  [facility], [facility] - Gastroenterology  6/29/2022

## 2022-06-29 NOTE — TELEPHONE ENCOUNTER
Discussed with patient post procedure findings. She has been taking omeprazole 20 mg daily. Only stopped for the last 2-3 days. Discussed recommendation to increase dose to 40 mg based on this. Will send script. Asking for different medication than amitiza prescribed last time given expense. I reviewed my office note which notes the multiple over the counter medications she has tried. Also notes trial of linzess with Dr. Pedraza Neigh reported caused abdominal pain and I have prescribed trulance in the past which she said caused diarrhea. There are a couple options we can consider though also may not be covered or could be expensive. These options would be tenapanor/Ibsrela or motegrity/prucalopride.     Please let her know I've sent in the new prescription for omeprazole and let her know about the other potential options for her constipation/IBS and possible issues we may run in with insurance/cost.    Omar Dempsey MD  [facility], [facility] - Gastroenterology  6/29/2022  3:27 PM

## 2022-07-13 ENCOUNTER — HOSPITAL ENCOUNTER (OUTPATIENT)
Dept: MAMMOGRAPHY | Age: 47
Discharge: HOME | End: 2022-07-13
Attending: FAMILY MEDICINE
Payer: COMMERCIAL

## 2022-07-13 ENCOUNTER — OFFICE VISIT (OUTPATIENT)
Dept: FAMILY MEDICINE CLINIC | Facility: CLINIC | Age: 47
End: 2022-07-13
Payer: COMMERCIAL

## 2022-07-13 VITALS
HEART RATE: 70 BPM | WEIGHT: 223 LBS | HEIGHT: 63.5 IN | BODY MASS INDEX: 39.02 KG/M2 | DIASTOLIC BLOOD PRESSURE: 81 MMHG | SYSTOLIC BLOOD PRESSURE: 134 MMHG | TEMPERATURE: 98 F

## 2022-07-13 DIAGNOSIS — R53.83 FATIGUE, UNSPECIFIED TYPE: ICD-10-CM

## 2022-07-13 DIAGNOSIS — Z12.31 ENCOUNTER FOR SCREENING MAMMOGRAM FOR BREAST CANCER: ICD-10-CM

## 2022-07-13 DIAGNOSIS — F32.A ANXIETY AND DEPRESSION: ICD-10-CM

## 2022-07-13 DIAGNOSIS — F41.9 ANXIETY AND DEPRESSION: ICD-10-CM

## 2022-07-13 DIAGNOSIS — R40.0 SOMNOLENCE: ICD-10-CM

## 2022-07-13 DIAGNOSIS — E66.9 OBESITY (BMI 30-39.9): ICD-10-CM

## 2022-07-13 DIAGNOSIS — E03.9 HYPOTHYROIDISM, UNSPECIFIED TYPE: Primary | ICD-10-CM

## 2022-07-13 DIAGNOSIS — G47.33 MILD OBSTRUCTIVE SLEEP APNEA: ICD-10-CM

## 2022-07-13 PROBLEM — E66.01 MORBID (SEVERE) OBESITY DUE TO EXCESS CALORIES (HCC): Status: ACTIVE | Noted: 2022-07-13

## 2022-07-13 PROCEDURE — 99214 OFFICE O/P EST MOD 30 MIN: CPT | Performed by: FAMILY MEDICINE

## 2022-07-13 PROCEDURE — 3008F BODY MASS INDEX DOCD: CPT | Performed by: FAMILY MEDICINE

## 2022-07-13 PROCEDURE — 3079F DIAST BP 80-89 MM HG: CPT | Performed by: FAMILY MEDICINE

## 2022-07-13 PROCEDURE — 77067 SCR MAMMO BI INCL CAD: CPT | Performed by: FAMILY MEDICINE

## 2022-07-13 PROCEDURE — 3075F SYST BP GE 130 - 139MM HG: CPT | Performed by: FAMILY MEDICINE

## 2022-07-13 PROCEDURE — 77063 BREAST TOMOSYNTHESIS BI: CPT | Performed by: FAMILY MEDICINE

## 2022-08-02 ENCOUNTER — TELEPHONE (OUTPATIENT)
Dept: CASE MANAGEMENT | Age: 47
End: 2022-08-02

## 2022-08-03 ENCOUNTER — TELEPHONE (OUTPATIENT)
Dept: CASE MANAGEMENT | Age: 47
End: 2022-08-03

## 2022-08-04 ENCOUNTER — TELEPHONE (OUTPATIENT)
Dept: CASE MANAGEMENT | Age: 47
End: 2022-08-04

## 2022-08-15 ENCOUNTER — HOSPITAL ENCOUNTER (OUTPATIENT)
Dept: ULTRASOUND IMAGING | Age: 47
Discharge: HOME | End: 2022-08-15
Attending: FAMILY MEDICINE
Payer: COMMERCIAL

## 2022-08-15 DIAGNOSIS — E03.9 HYPOTHYROIDISM, UNSPECIFIED TYPE: ICD-10-CM

## 2022-08-15 PROCEDURE — 76536 US EXAM OF HEAD AND NECK: CPT | Performed by: FAMILY MEDICINE

## 2022-08-24 DIAGNOSIS — R73.03 PRE-DIABETES: ICD-10-CM

## 2022-08-24 RX ORDER — METFORMIN HYDROCHLORIDE 500 MG/1
TABLET, EXTENDED RELEASE ORAL
Qty: 90 TABLET | Refills: 0 | OUTPATIENT
Start: 2022-08-24

## 2022-09-23 DIAGNOSIS — E03.9 HYPOTHYROIDISM, UNSPECIFIED TYPE: ICD-10-CM

## 2022-09-23 NOTE — TELEPHONE ENCOUNTER
Please review. Protocol failed / No protocol.     Requested Prescriptions   Pending Prescriptions Disp Refills    LEVOTHYROXINE 75 MCG Oral Tab [Pharmacy Med Name: LEVOTHYROXINE 0.075MG (75MCG) TABS] 90 tablet 0     Sig: TAKE 1 TABLET(75 MCG) BY MOUTH BEFORE BREAKFAST        Thyroid Medication Protocol Failed - 9/23/2022  8:43 AM        Failed - Last TSH value is normal     Lab Results   Component Value Date    TSH 0.037 (L) 06/20/2022    THYROIDFUNC 19.58 (H) 09/10/2014                 Passed - TSH in past 12 months        Passed - In person appointment or virtual visit in the past 12 mos or appointment in next 3 mos       Recent Outpatient Visits              2 months ago Hypothyroidism, unspecified type    Jyoti Vu MD    Office Visit    3 months ago Augustine Wall MD    Office Visit    4 months ago Gastroesophageal reflux disease, unspecified whether esophagitis present    Pamela Rose Vallorie Bollard, MD    Office Visit    7 months ago Family history of congenital heart disease    Jyoti Vu MD    Telemedicine    9 months ago Rita Zhao MD    Office Visit                       Recent Outpatient Visits              2 months ago Hypothyroidism, unspecified type    Jyoti Vu MD    Office Visit    3 months ago Augustine Wall MD    Office Visit    4 months ago Gastroesophageal reflux disease, unspecified whether esophagitis present    Pamela Rose Vallorie Bollard, MD    Office Visit    7 months ago Family history of congenital heart disease    Jyoti Vu MD    Telemedicine    9 months ago Pre-diabetes    Lorenza Lal MD    Office Visit

## 2022-09-25 RX ORDER — LEVOTHYROXINE SODIUM 0.07 MG/1
75 TABLET ORAL
Qty: 30 TABLET | Refills: 0 | Status: SHIPPED | OUTPATIENT
Start: 2022-09-25

## 2022-09-26 DIAGNOSIS — E03.9 HYPOTHYROIDISM, UNSPECIFIED TYPE: ICD-10-CM

## 2022-09-26 RX ORDER — LEVOTHYROXINE SODIUM 0.07 MG/1
75 TABLET ORAL
Qty: 90 TABLET | Refills: 0 | OUTPATIENT
Start: 2022-09-26

## 2022-09-27 NOTE — TELEPHONE ENCOUNTER
Please review. Protocol failed / No Protocol. Requested Prescriptions   Pending Prescriptions Disp Refills    LEVOTHYROXINE 75 MCG Oral Tab [Pharmacy Med Name: LEVOTHYROXINE 0.075MG (75MCG) TABS] 90 tablet 0     Sig: TAKE 1 TABLET(75 MCG) BY MOUTH BEFORE BREAKFAST.  REPEAT THYROID BLOOD TEST        Thyroid Medication Protocol Failed - 9/26/2022  6:49 AM        Failed - Last TSH value is normal     Lab Results   Component Value Date    TSH 0.037 (L) 06/20/2022    THYROIDFUNC 19.58 (H) 09/10/2014                 Passed - TSH in past 12 months        Passed - In person appointment or virtual visit in the past 12 mos or appointment in next 3 mos       Recent Outpatient Visits              2 months ago Hypothyroidism, unspecified type    Nuha Corbin MD    Office Visit    3 months ago Lj Bravo MD    Office Visit    4 months ago Gastroesophageal reflux disease, unspecified whether esophagitis present    [facility], Vania Taylor Junior, MD    Office Visit    7 months ago Family history of congenital heart disease    Nuha Corbin MD    Telemedicine    9 months ago Nicholas Sport, Ashok Harkins MD    Office Visit                        Recent Outpatient Visits              2 months ago Hypothyroidism, unspecified type    Nuha Corbin MD    Office Visit    3 months ago [address] [place] [address], [facility], MD    Office Visit    4 months ago Gastroesophageal reflux disease, unspecified whether esophagitis present    [facility], Vania Taylor Junior, MD    Office Visit    7 months ago Family history of congenital heart disease    [facility], [address] [place] [address] MD SIXTO    Telemedicine    9 months ago Pre-diabetes    Rigoberto Leep, Daymon Gaucher, MD    Office Visit

## 2022-09-28 ENCOUNTER — NURSE TRIAGE (OUTPATIENT)
Dept: FAMILY MEDICINE CLINIC | Facility: CLINIC | Age: 47
End: 2022-09-28

## 2022-09-29 ENCOUNTER — TELEMEDICINE (OUTPATIENT)
Dept: FAMILY MEDICINE CLINIC | Facility: CLINIC | Age: 47
End: 2022-09-29

## 2022-09-29 DIAGNOSIS — T14.8XXA ABRASION OF SKIN: Primary | ICD-10-CM

## 2022-10-05 DIAGNOSIS — R73.03 PRE-DIABETES: ICD-10-CM

## 2022-10-06 RX ORDER — METFORMIN HYDROCHLORIDE 500 MG/1
TABLET, EXTENDED RELEASE ORAL
Qty: 90 TABLET | Refills: 0 | OUTPATIENT
Start: 2022-10-06

## 2022-10-27 ENCOUNTER — TELEMEDICINE (OUTPATIENT)
Dept: FAMILY MEDICINE CLINIC | Facility: CLINIC | Age: 47
End: 2022-10-27

## 2022-10-27 ENCOUNTER — NURSE TRIAGE (OUTPATIENT)
Dept: FAMILY MEDICINE CLINIC | Facility: CLINIC | Age: 47
End: 2022-10-27

## 2022-10-27 ENCOUNTER — TELEPHONE (OUTPATIENT)
Dept: FAMILY MEDICINE CLINIC | Facility: CLINIC | Age: 47
End: 2022-10-27

## 2022-10-27 DIAGNOSIS — U07.1 COVID-19: Primary | ICD-10-CM

## 2022-10-27 LAB — AMB EXT COVID-19 RESULT: DETECTED

## 2022-10-27 PROCEDURE — 99213 OFFICE O/P EST LOW 20 MIN: CPT | Performed by: FAMILY MEDICINE

## 2022-10-27 RX ORDER — NIRMATRELVIR AND RITONAVIR 300-100 MG
KIT ORAL
Qty: 30 TABLET | Refills: 0 | Status: SHIPPED | OUTPATIENT
Start: 2022-10-27 | End: 2022-11-01

## 2022-10-27 RX ORDER — ALBUTEROL SULFATE 90 UG/1
2 AEROSOL, METERED RESPIRATORY (INHALATION) EVERY 6 HOURS PRN
Qty: 1 EACH | Refills: 1 | Status: SHIPPED | OUTPATIENT
Start: 2022-10-27

## 2022-10-27 NOTE — TELEPHONE ENCOUNTER
Jese Ferrell phone call regarding Paxlovid:  Spoke with Quintin Parker    Symptoms started 10/26/22  Video visit today with PCP  GFR-77 on 6/20/22    Per triage note:  Reports that she was tested positive for COVID today,infection banner updated,  symptoms started yesterday,she's been sick, sore throat, headache, runny nose, cough, body aches, chills, did not check her temperature,little short of breath on activity, not in distress, able to complete full sentences, no chest pain. CDC isolation guidelines provided.

## 2022-10-27 NOTE — TELEPHONE ENCOUNTER
Pharmacy needs to know when patient's COVID symptoms started and last GFR before dispensing paxlovid

## 2022-11-01 ENCOUNTER — LAB ENCOUNTER (OUTPATIENT)
Dept: LAB | Age: 47
End: 2022-11-01
Attending: FAMILY MEDICINE
Payer: COMMERCIAL

## 2022-11-01 DIAGNOSIS — E78.00 HYPERCHOLESTEREMIA: ICD-10-CM

## 2022-11-01 DIAGNOSIS — E03.9 HYPOTHYROIDISM, UNSPECIFIED TYPE: ICD-10-CM

## 2022-11-01 DIAGNOSIS — R73.03 PRE-DIABETES: ICD-10-CM

## 2022-11-01 LAB
ALBUMIN SERPL-MCNC: 4 G/DL (ref 3.4–5)
ALBUMIN/GLOB SERPL: 1 {RATIO} (ref 1–2)
ALP LIVER SERPL-CCNC: 164 U/L
ALT SERPL-CCNC: 57 U/L
ANION GAP SERPL CALC-SCNC: 7 MMOL/L (ref 0–18)
AST SERPL-CCNC: 21 U/L (ref 15–37)
BILIRUB SERPL-MCNC: 0.4 MG/DL (ref 0.1–2)
BUN BLD-MCNC: 12 MG/DL (ref 7–18)
BUN/CREAT SERPL: 11.7 (ref 10–20)
CALCIUM BLD-MCNC: 9.2 MG/DL (ref 8.5–10.1)
CHLORIDE SERPL-SCNC: 103 MMOL/L (ref 98–112)
CO2 SERPL-SCNC: 27 MMOL/L (ref 21–32)
CREAT BLD-MCNC: 1.03 MG/DL
EST. AVERAGE GLUCOSE BLD GHB EST-MCNC: 146 MG/DL (ref 68–126)
FASTING STATUS PATIENT QL REPORTED: NO
GFR SERPLBLD BASED ON 1.73 SQ M-ARVRAT: 67 ML/MIN/1.73M2 (ref 60–?)
GLOBULIN PLAS-MCNC: 4.1 G/DL (ref 2.8–4.4)
GLUCOSE BLD-MCNC: 129 MG/DL (ref 70–99)
HBA1C MFR BLD: 6.7 % (ref ?–5.7)
OSMOLALITY SERPL CALC.SUM OF ELEC: 285 MOSM/KG (ref 275–295)
POTASSIUM SERPL-SCNC: 3.7 MMOL/L (ref 3.5–5.1)
PROT SERPL-MCNC: 8.1 G/DL (ref 6.4–8.2)
SODIUM SERPL-SCNC: 137 MMOL/L (ref 136–145)
T4 FREE SERPL-MCNC: 0.9 NG/DL (ref 0.8–1.7)
TSI SER-ACNC: 5.39 MIU/ML (ref 0.36–3.74)

## 2022-11-01 PROCEDURE — 84439 ASSAY OF FREE THYROXINE: CPT

## 2022-11-01 PROCEDURE — 80053 COMPREHEN METABOLIC PANEL: CPT

## 2022-11-01 PROCEDURE — 83036 HEMOGLOBIN GLYCOSYLATED A1C: CPT

## 2022-11-01 PROCEDURE — 36415 COLL VENOUS BLD VENIPUNCTURE: CPT

## 2022-11-01 PROCEDURE — 84443 ASSAY THYROID STIM HORMONE: CPT

## 2022-11-02 DIAGNOSIS — E03.9 HYPOTHYROIDISM, UNSPECIFIED TYPE: ICD-10-CM

## 2022-11-02 RX ORDER — LEVOTHYROXINE SODIUM 88 UG/1
TABLET ORAL
Qty: 30 TABLET | Refills: 0 | Status: SHIPPED | OUTPATIENT
Start: 2022-11-02

## 2022-11-02 RX ORDER — LEVOTHYROXINE SODIUM 0.07 MG/1
TABLET ORAL
Qty: 90 TABLET | Refills: 0 | Status: SHIPPED | OUTPATIENT
Start: 2022-11-02

## 2022-11-28 ENCOUNTER — NURSE TRIAGE (OUTPATIENT)
Dept: FAMILY MEDICINE CLINIC | Facility: CLINIC | Age: 47
End: 2022-11-28

## 2022-11-29 ENCOUNTER — OFFICE VISIT (OUTPATIENT)
Dept: FAMILY MEDICINE CLINIC | Facility: CLINIC | Age: 47
End: 2022-11-29
Payer: COMMERCIAL

## 2022-11-29 VITALS
WEIGHT: 233 LBS | RESPIRATION RATE: 18 BRPM | HEIGHT: 63.5 IN | HEART RATE: 73 BPM | TEMPERATURE: 97 F | SYSTOLIC BLOOD PRESSURE: 136 MMHG | DIASTOLIC BLOOD PRESSURE: 87 MMHG | BODY MASS INDEX: 40.77 KG/M2

## 2022-11-29 DIAGNOSIS — I88.9 SUBMENTAL LYMPHADENITIS: Primary | ICD-10-CM

## 2022-11-29 DIAGNOSIS — T14.8XXA ABRASION OF SKIN: ICD-10-CM

## 2022-11-29 PROCEDURE — 3075F SYST BP GE 130 - 139MM HG: CPT | Performed by: FAMILY MEDICINE

## 2022-11-29 PROCEDURE — 3079F DIAST BP 80-89 MM HG: CPT | Performed by: FAMILY MEDICINE

## 2022-11-29 PROCEDURE — 3008F BODY MASS INDEX DOCD: CPT | Performed by: FAMILY MEDICINE

## 2022-11-29 PROCEDURE — 99213 OFFICE O/P EST LOW 20 MIN: CPT | Performed by: FAMILY MEDICINE

## 2022-11-29 RX ORDER — AZITHROMYCIN 250 MG/1
TABLET, FILM COATED ORAL
Qty: 6 TABLET | Refills: 0 | Status: SHIPPED | OUTPATIENT
Start: 2022-11-29 | End: 2022-12-04

## 2022-12-06 ENCOUNTER — TELEMEDICINE (OUTPATIENT)
Dept: FAMILY MEDICINE CLINIC | Facility: CLINIC | Age: 47
End: 2022-12-06

## 2022-12-06 DIAGNOSIS — R74.01 ELEVATED ALT MEASUREMENT: Primary | ICD-10-CM

## 2022-12-06 DIAGNOSIS — R10.11 RIGHT UPPER QUADRANT ABDOMINAL PAIN: ICD-10-CM

## 2022-12-06 DIAGNOSIS — E11.65 TYPE 2 DIABETES MELLITUS WITH HYPERGLYCEMIA, WITHOUT LONG-TERM CURRENT USE OF INSULIN (HCC): ICD-10-CM

## 2022-12-06 PROCEDURE — 99214 OFFICE O/P EST MOD 30 MIN: CPT | Performed by: FAMILY MEDICINE

## 2022-12-06 RX ORDER — METFORMIN HYDROCHLORIDE 500 MG/1
500 TABLET, EXTENDED RELEASE ORAL DAILY
Qty: 90 TABLET | Refills: 3 | Status: SHIPPED | OUTPATIENT
Start: 2022-12-06

## 2022-12-07 ENCOUNTER — LAB ENCOUNTER (OUTPATIENT)
Dept: LAB | Age: 47
End: 2022-12-07
Attending: FAMILY MEDICINE
Payer: COMMERCIAL

## 2022-12-07 DIAGNOSIS — R10.11 RIGHT UPPER QUADRANT ABDOMINAL PAIN: ICD-10-CM

## 2022-12-07 DIAGNOSIS — R74.01 ELEVATED ALT MEASUREMENT: ICD-10-CM

## 2022-12-07 DIAGNOSIS — E11.65 TYPE 2 DIABETES MELLITUS WITH HYPERGLYCEMIA, WITHOUT LONG-TERM CURRENT USE OF INSULIN (HCC): ICD-10-CM

## 2022-12-07 LAB
BASOPHILS # BLD AUTO: 0.06 X10(3) UL (ref 0–0.2)
BASOPHILS NFR BLD AUTO: 0.6 %
DEPRECATED RDW RBC AUTO: 42.1 FL (ref 35.1–46.3)
EOSINOPHIL # BLD AUTO: 0.12 X10(3) UL (ref 0–0.7)
EOSINOPHIL NFR BLD AUTO: 1.2 %
ERYTHROCYTE [DISTWIDTH] IN BLOOD BY AUTOMATED COUNT: 13 % (ref 11–15)
HCT VFR BLD AUTO: 38.5 %
HGB BLD-MCNC: 12.8 G/DL
IMM GRANULOCYTES # BLD AUTO: 0.02 X10(3) UL (ref 0–1)
IMM GRANULOCYTES NFR BLD: 0.2 %
LYMPHOCYTES # BLD AUTO: 2.57 X10(3) UL (ref 1–4)
LYMPHOCYTES NFR BLD AUTO: 26.7 %
MCH RBC QN AUTO: 29.4 PG (ref 26–34)
MCHC RBC AUTO-ENTMCNC: 33.2 G/DL (ref 31–37)
MCV RBC AUTO: 88.5 FL
MONOCYTES # BLD AUTO: 0.8 X10(3) UL (ref 0.1–1)
MONOCYTES NFR BLD AUTO: 8.3 %
NEUTROPHILS # BLD AUTO: 6.07 X10 (3) UL (ref 1.5–7.7)
NEUTROPHILS # BLD AUTO: 6.07 X10(3) UL (ref 1.5–7.7)
NEUTROPHILS NFR BLD AUTO: 63 %
PLATELET # BLD AUTO: 416 10(3)UL (ref 150–450)
RBC # BLD AUTO: 4.35 X10(6)UL
WBC # BLD AUTO: 9.6 X10(3) UL (ref 4–11)

## 2022-12-07 PROCEDURE — 82043 UR ALBUMIN QUANTITATIVE: CPT

## 2022-12-07 PROCEDURE — 36415 COLL VENOUS BLD VENIPUNCTURE: CPT

## 2022-12-07 PROCEDURE — 81001 URINALYSIS AUTO W/SCOPE: CPT

## 2022-12-07 PROCEDURE — 80053 COMPREHEN METABOLIC PANEL: CPT

## 2022-12-07 PROCEDURE — 85025 COMPLETE CBC W/AUTO DIFF WBC: CPT

## 2022-12-07 PROCEDURE — 81015 MICROSCOPIC EXAM OF URINE: CPT

## 2022-12-07 PROCEDURE — 82570 ASSAY OF URINE CREATININE: CPT

## 2022-12-08 LAB
ALBUMIN SERPL-MCNC: 3.7 G/DL (ref 3.4–5)
ALBUMIN/GLOB SERPL: 0.9 {RATIO} (ref 1–2)
ALP LIVER SERPL-CCNC: 168 U/L
ALT SERPL-CCNC: 48 U/L
ANION GAP SERPL CALC-SCNC: 5 MMOL/L (ref 0–18)
AST SERPL-CCNC: 14 U/L (ref 15–37)
BILIRUB SERPL-MCNC: 0.3 MG/DL (ref 0.1–2)
BILIRUB UR QL: NEGATIVE
BUN BLD-MCNC: 14 MG/DL (ref 7–18)
BUN/CREAT SERPL: 12.7 (ref 10–20)
CALCIUM BLD-MCNC: 9.3 MG/DL (ref 8.5–10.1)
CHLORIDE SERPL-SCNC: 104 MMOL/L (ref 98–112)
CO2 SERPL-SCNC: 29 MMOL/L (ref 21–32)
COLOR UR: YELLOW
CREAT BLD-MCNC: 1.1 MG/DL
CREAT UR-SCNC: 255 MG/DL
FASTING STATUS PATIENT QL REPORTED: NO
GFR SERPLBLD BASED ON 1.73 SQ M-ARVRAT: 62 ML/MIN/1.73M2 (ref 60–?)
GLOBULIN PLAS-MCNC: 4 G/DL (ref 2.8–4.4)
GLUCOSE BLD-MCNC: 147 MG/DL (ref 70–99)
GLUCOSE UR-MCNC: NEGATIVE MG/DL
HGB UR QL STRIP.AUTO: NEGATIVE
KETONES UR-MCNC: NEGATIVE MG/DL
LEUKOCYTE ESTERASE UR QL STRIP.AUTO: NEGATIVE
MICROALBUMIN UR-MCNC: 2.68 MG/DL
MICROALBUMIN/CREAT 24H UR-RTO: 10.5 UG/MG (ref ?–30)
NITRITE UR QL STRIP.AUTO: NEGATIVE
OSMOLALITY SERPL CALC.SUM OF ELEC: 289 MOSM/KG (ref 275–295)
PH UR: 6 [PH] (ref 5–8)
POTASSIUM SERPL-SCNC: 3.7 MMOL/L (ref 3.5–5.1)
PROT SERPL-MCNC: 7.7 G/DL (ref 6.4–8.2)
PROT UR-MCNC: NEGATIVE MG/DL
SODIUM SERPL-SCNC: 138 MMOL/L (ref 136–145)
SP GR UR STRIP: >=1.03 (ref 1–1.03)
UROBILINOGEN UR STRIP-ACNC: 0.2

## 2022-12-11 DIAGNOSIS — R82.90 URINE ABNORMALITY: Primary | ICD-10-CM

## 2022-12-15 ENCOUNTER — HOSPITAL ENCOUNTER (OUTPATIENT)
Dept: ENDOCRINOLOGY | Facility: HOSPITAL | Age: 47
Discharge: HOME | End: 2022-12-15
Attending: FAMILY MEDICINE
Payer: COMMERCIAL

## 2022-12-15 VITALS — BODY MASS INDEX: 41 KG/M2 | WEIGHT: 233.31 LBS

## 2022-12-15 DIAGNOSIS — E11.65 TYPE 2 DIABETES MELLITUS WITH HYPERGLYCEMIA, WITHOUT LONG-TERM CURRENT USE OF INSULIN (HCC): ICD-10-CM

## 2022-12-15 PROCEDURE — 97802 MEDICAL NUTRITION INDIV IN: CPT

## 2023-01-03 RX ORDER — OMEPRAZOLE 40 MG/1
CAPSULE, DELAYED RELEASE ORAL
Qty: 90 CAPSULE | Refills: 1 | Status: SHIPPED | OUTPATIENT
Start: 2023-01-03

## 2023-02-28 ENCOUNTER — OFFICE VISIT (OUTPATIENT)
Dept: OPHTHALMOLOGY | Facility: CLINIC | Age: 48
End: 2023-02-28

## 2023-02-28 DIAGNOSIS — H52.4 PRESBYOPIA OF BOTH EYES: ICD-10-CM

## 2023-02-28 DIAGNOSIS — E11.9 DIABETES MELLITUS TYPE 2 WITHOUT RETINOPATHY (HCC): Primary | ICD-10-CM

## 2023-02-28 PROCEDURE — 92004 COMPRE OPH EXAM NEW PT 1/>: CPT | Performed by: OPHTHALMOLOGY

## 2023-02-28 PROCEDURE — 2023F DILAT RTA XM W/O RTNOPTHY: CPT | Performed by: OPHTHALMOLOGY

## 2023-02-28 NOTE — PATIENT INSTRUCTIONS
Presbyopia of both eyes  Suggest +1.25 over the counter glasses for reading. Diabetes mellitus type 2 without retinopathy ([address]  Diabetes type II: no background of retinopathy, no signs of neovascularization noted. Discussed ocular and systemic benefits of blood sugar control. Diagnosis and treatment discussed in detail with patient.

## 2023-03-01 ENCOUNTER — TELEPHONE (OUTPATIENT)
Dept: FAMILY MEDICINE CLINIC | Facility: CLINIC | Age: 48
End: 2023-03-01

## 2023-03-01 DIAGNOSIS — E66.9 OBESITY (BMI 30-39.9): Primary | ICD-10-CM

## 2023-03-01 DIAGNOSIS — G47.33 MILD OBSTRUCTIVE SLEEP APNEA: Primary | ICD-10-CM

## 2023-03-01 NOTE — TELEPHONE ENCOUNTER
Per Jerry Quezada with the [address] [place] [address] Sleep center,  Patient's insurance denied CPAP titration study and they are requesting an order for an auto pap machine.  Patient can be referred to pulmonology to obtain order or order can be placed by Dr. Trevon Adamson

## 2023-03-02 RX ORDER — PERPHENAZINE 16 MG/1
TABLET, FILM COATED ORAL
Qty: 100 STRIP | Refills: 3 | Status: SHIPPED | OUTPATIENT
Start: 2023-03-02

## 2023-03-02 RX ORDER — LANCETS
EACH MISCELLANEOUS
Qty: 100 EACH | Refills: 3 | Status: SHIPPED | OUTPATIENT
Start: 2023-03-02

## 2023-03-02 NOTE — TELEPHONE ENCOUNTER
Refill passed per [employer], [facility] protocol    Requested Prescriptions   Pending Prescriptions Disp Refills    CONTOUR NEXT TEST In Vitro Strip [Pharmacy Med Name: CONTOUR NEXT TEST STRIPS 100S] 100 strip 0     Sig: USE TO CHECK BLOOD SUGAR ONCE DAILY       Diabetic Supplies Protocol Passed - 3/2/2023  8:23 AM        Passed - In person appointment or virtual visit in the past 12 mos or appointment in next 3 mos     Recent Outpatient Visits              2 days ago Diabetes mellitus type 2 without retinopathy ([facility] [address]    Shane Marquez, [address] Ines Moody MD    Office Visit    2 months ago Elevated ALT measurement    [facility]ane Marquez, [facility]    Telemedicine    3 months ago Submental lymphadenitis    Franco Santos MD    Office Visit    4 months ago COVID-19    Shane Marquez, [address] [place], [place] [address] Jordana Drake MD    Telemedicine    5 months ago Abrasion of skin    Shane Marquez, [address], [place] CHRISTOPHER Peres    Telemedicine          Future Appointments         Provider Department Appt Notes    In 1 week Neeraj WSilverio Anderson cpap test on 3/17    In 2 weeks [address] [address] DENIED case# [id number]  covid test scheduled    In 2 weeks [place]     In 3 weeks Nicki Matos MD [facility], [address] [facility], [place] [employer]BS PPO  NON SX F/U    In 1 month Víctor Liz MD [facility], [facility]

## 2023-03-02 NOTE — TELEPHONE ENCOUNTER
Refill passed per [place] [employer], [facility] protocol.      Requested Prescriptions   Pending Prescriptions Disp Refills    MICROLET LANCETS Does not apply Misc [Pharmacy Med Name: Jorje Nicolas 100] 100 each 0     Sig: USE TO TEST BLOOD SUGAR ONCE DAILY       Diabetic Supplies Protocol Passed - 3/2/2023  9:44 AM        Passed - In person appointment or virtual visit in the past 12 mos or appointment in next 3 mos     Recent Outpatient Visits              2 days ago Diabetes mellitus type 2 without retinopathy ([facility] [address]    [address], [address], Quang Oscar MD    Office Visit    2 months ago Elevated ALT measurement    [address], [facility]    Telemedicine    3 months ago Submental lymphadenitis    Bk More MD    Office Visit    4 months ago COVID-19    [address], [address] [facility] Kandace Stein MD    Telemedicine    5 months ago Abrasion of skin    [address], [address], [place] Rose AustinCHRISTOPHER    Telemedicine          Future Appointments         Provider Department Appt Notes    In 1 week [address] cpap test on 3/17    In 2 weeks [facility] [address] DENIED case# [id number]  covid test scheduled    In 2 weeks [place]     In 3 weeks Pasquale Alba MD [address], [address], [place] [employer] PPO  NON SX F/U    In 1 month Noreen Us MD [address], [address] [facility] & Thyroid                     Recent Outpatient Visits              2 days ago Diabetes mellitus type 2 without retinopathy [facility])    [address], [address], Quang Oscar MD    Office Visit    2 months ago Elevated ALT measurement [address] [facility] Janiya Muir MD    Telemedicine    3 months ago Submental lymphadenitis    Sarah Luther MD    Office Visit    4 months ago COVID-19    [address], [address] [facility] MD Trip    Telemedicine    5 months ago Abrasion of skin    [address], [address] [facility], [place] Theda CHRISTOPHER Fields    Telemedicine             Future Appointments         Provider Department Appt Notes    In 1 week [address] cpap test on 3/17    In 2 weeks [address] [facility] [address] DENIED case# [id number]  covid test scheduled    In 2 weeks [place]     In 3 weeks [facility], [address] [place] [address],[address], [place] [employer] PPO  NON SX F/U    In 1 month Eliz Zarate MD [address], 602 [facility], [place] [facility] Thyroid

## 2023-03-13 DIAGNOSIS — E03.9 HYPOTHYROIDISM, UNSPECIFIED TYPE: ICD-10-CM

## 2023-03-14 RX ORDER — LEVOTHYROXINE SODIUM 88 UG/1
TABLET ORAL
Qty: 30 TABLET | Refills: 0 | OUTPATIENT
Start: 2023-03-14

## 2023-03-19 ENCOUNTER — HOSPITAL ENCOUNTER (OUTPATIENT)
Dept: ULTRASOUND IMAGING | Age: 48
End: 2023-03-19
Attending: FAMILY MEDICINE
Payer: COMMERCIAL

## 2023-03-19 ENCOUNTER — HOSPITAL ENCOUNTER (OUTPATIENT)
Dept: ULTRASOUND IMAGING | Age: 48
Discharge: HOME | End: 2023-03-19
Attending: FAMILY MEDICINE
Payer: COMMERCIAL

## 2023-03-19 DIAGNOSIS — R10.11 RIGHT UPPER QUADRANT ABDOMINAL PAIN: ICD-10-CM

## 2023-03-19 PROCEDURE — 76705 ECHO EXAM OF ABDOMEN: CPT | Performed by: FAMILY MEDICINE

## 2023-03-22 ENCOUNTER — TELEPHONE (OUTPATIENT)
Dept: SURGERY | Facility: CLINIC | Age: 48
End: 2023-03-22

## 2023-03-23 ENCOUNTER — OFFICE VISIT (OUTPATIENT)
Dept: SURGERY | Facility: CLINIC | Age: 48
End: 2023-03-23
Payer: COMMERCIAL

## 2023-03-23 VITALS
OXYGEN SATURATION: 99 % | WEIGHT: 233.81 LBS | HEART RATE: 121 BPM | DIASTOLIC BLOOD PRESSURE: 69 MMHG | HEIGHT: 63.9 IN | BODY MASS INDEX: 40.41 KG/M2 | SYSTOLIC BLOOD PRESSURE: 108 MMHG

## 2023-03-23 DIAGNOSIS — E66.01 MORBID OBESITY WITH BMI OF 40.0-44.9, ADULT (HCC): ICD-10-CM

## 2023-03-23 DIAGNOSIS — E78.00 HYPERCHOLESTEREMIA: ICD-10-CM

## 2023-03-23 DIAGNOSIS — Z51.81 ENCOUNTER FOR THERAPEUTIC DRUG MONITORING: ICD-10-CM

## 2023-03-23 DIAGNOSIS — E11.9 TYPE 2 DIABETES MELLITUS WITHOUT COMPLICATION, WITHOUT LONG-TERM CURRENT USE OF INSULIN (HCC): Primary | ICD-10-CM

## 2023-03-23 DIAGNOSIS — F50.81 BINGE EATING DISORDER: ICD-10-CM

## 2023-03-23 DIAGNOSIS — G47.33 OSA (OBSTRUCTIVE SLEEP APNEA): ICD-10-CM

## 2023-03-23 DIAGNOSIS — E66.9 OBESITY (BMI 30-39.9): ICD-10-CM

## 2023-03-23 PROCEDURE — 3008F BODY MASS INDEX DOCD: CPT | Performed by: INTERNAL MEDICINE

## 2023-03-23 PROCEDURE — 3074F SYST BP LT 130 MM HG: CPT | Performed by: INTERNAL MEDICINE

## 2023-03-23 PROCEDURE — 99214 OFFICE O/P EST MOD 30 MIN: CPT | Performed by: INTERNAL MEDICINE

## 2023-03-23 PROCEDURE — 3078F DIAST BP <80 MM HG: CPT | Performed by: INTERNAL MEDICINE

## 2023-03-23 RX ORDER — TOPIRAMATE 25 MG/1
25 TABLET ORAL 2 TIMES DAILY
Qty: 180 TABLET | Refills: 1 | Status: SHIPPED | OUTPATIENT
Start: 2023-03-23 | End: 2023-06-21

## 2023-03-23 RX ORDER — TIRZEPATIDE 2.5 MG/.5ML
2.5 INJECTION, SOLUTION SUBCUTANEOUS WEEKLY
Qty: 2 ML | Refills: 1 | Status: SHIPPED | OUTPATIENT
Start: 2023-03-23

## 2023-03-23 RX ORDER — PHENTERMINE HYDROCHLORIDE 37.5 MG/1
37.5 TABLET ORAL
Qty: 30 TABLET | Refills: 2 | Status: SHIPPED | OUTPATIENT
Start: 2023-03-23

## 2023-04-14 ENCOUNTER — LAB ENCOUNTER (OUTPATIENT)
Dept: LAB | Age: 48
End: 2023-04-14
Attending: FAMILY MEDICINE
Payer: COMMERCIAL

## 2023-04-14 DIAGNOSIS — E03.9 HYPOTHYROIDISM, UNSPECIFIED TYPE: ICD-10-CM

## 2023-04-14 LAB — TSI SER-ACNC: 1.21 MIU/ML (ref 0.36–3.74)

## 2023-04-14 PROCEDURE — 36415 COLL VENOUS BLD VENIPUNCTURE: CPT

## 2023-04-14 PROCEDURE — 84443 ASSAY THYROID STIM HORMONE: CPT

## 2023-04-15 ENCOUNTER — OFFICE VISIT (OUTPATIENT)
Dept: ENDOCRINOLOGY CLINIC | Facility: CLINIC | Age: 48
End: 2023-04-15

## 2023-04-15 VITALS
DIASTOLIC BLOOD PRESSURE: 75 MMHG | HEART RATE: 98 BPM | BODY MASS INDEX: 40.25 KG/M2 | SYSTOLIC BLOOD PRESSURE: 115 MMHG | HEIGHT: 63.5 IN | WEIGHT: 230 LBS

## 2023-04-15 DIAGNOSIS — I10 HYPERTENSION, UNSPECIFIED TYPE: ICD-10-CM

## 2023-04-15 DIAGNOSIS — E11.65 TYPE 2 DIABETES MELLITUS WITH HYPERGLYCEMIA, WITHOUT LONG-TERM CURRENT USE OF INSULIN (HCC): Primary | ICD-10-CM

## 2023-04-15 DIAGNOSIS — E03.9 HYPOTHYROIDISM, UNSPECIFIED TYPE: ICD-10-CM

## 2023-04-15 DIAGNOSIS — E78.5 DYSLIPIDEMIA: ICD-10-CM

## 2023-04-15 LAB
GLUCOSE BLOOD: 128
TEST STRIP LOT #: NORMAL NUMERIC

## 2023-04-15 PROCEDURE — 3074F SYST BP LT 130 MM HG: CPT | Performed by: INTERNAL MEDICINE

## 2023-04-15 PROCEDURE — 3008F BODY MASS INDEX DOCD: CPT | Performed by: INTERNAL MEDICINE

## 2023-04-15 PROCEDURE — 99244 OFF/OP CNSLTJ NEW/EST MOD 40: CPT | Performed by: INTERNAL MEDICINE

## 2023-04-15 PROCEDURE — 3078F DIAST BP <80 MM HG: CPT | Performed by: INTERNAL MEDICINE

## 2023-04-15 PROCEDURE — 82947 ASSAY GLUCOSE BLOOD QUANT: CPT | Performed by: INTERNAL MEDICINE

## 2023-04-15 RX ORDER — TIRZEPATIDE 2.5 MG/.5ML
2.5 INJECTION, SOLUTION SUBCUTANEOUS WEEKLY
Qty: 2 ML | Refills: 0 | Status: SHIPPED | OUTPATIENT
Start: 2023-04-15 | End: 2023-05-15

## 2023-04-17 DIAGNOSIS — E03.9 HYPOTHYROIDISM, UNSPECIFIED TYPE: ICD-10-CM

## 2023-04-17 RX ORDER — LEVOTHYROXINE SODIUM 88 UG/1
TABLET ORAL
Qty: 90 TABLET | Refills: 0 | Status: SHIPPED | OUTPATIENT
Start: 2023-04-17

## 2023-04-17 RX ORDER — LEVOTHYROXINE SODIUM 0.07 MG/1
TABLET ORAL
Qty: 90 TABLET | Refills: 3 | Status: SHIPPED | OUTPATIENT
Start: 2023-04-17

## 2023-05-13 DIAGNOSIS — E11.65 TYPE 2 DIABETES MELLITUS WITH HYPERGLYCEMIA, WITHOUT LONG-TERM CURRENT USE OF INSULIN (HCC): ICD-10-CM

## 2023-05-18 NOTE — TELEPHONE ENCOUNTER
Pt calling for status on refill for Mounjaro. She is also requesting additional refills to be added. She is currently out of medication.   Please call

## 2023-05-19 RX ORDER — TIRZEPATIDE 5 MG/.5ML
5 INJECTION, SOLUTION SUBCUTANEOUS WEEKLY
Qty: 2 ML | Refills: 0 | Status: SHIPPED | OUTPATIENT
Start: 2023-05-19 | End: 2023-06-18

## 2023-05-19 RX ORDER — TIRZEPATIDE 2.5 MG/.5ML
INJECTION, SOLUTION SUBCUTANEOUS
Qty: 2 ML | Refills: 0 | OUTPATIENT
Start: 2023-05-19

## 2023-05-19 NOTE — TELEPHONE ENCOUNTER
Hi!  Please let patient know that I have increased her Mounjaro dose to 5.0mg qweekly. Please ask her to let me know how she is doing in 3 weeks. Tajacobo you!

## 2023-05-19 NOTE — TELEPHONE ENCOUNTER
DRUG: MOUNJARO 2.5MG/0.5ML PF PEN INJ  SIG: ADMINISTER 2.5 MG (0.5ML) UNDER THE SKIN 1 TIME A WEEK  QTY: 2

## 2023-06-15 RX ORDER — TIRZEPATIDE 5 MG/.5ML
5 INJECTION, SOLUTION SUBCUTANEOUS WEEKLY
Qty: 2 ML | Refills: 0 | Status: SHIPPED | OUTPATIENT
Start: 2023-06-15 | End: 2023-07-15

## 2023-06-22 ENCOUNTER — LAB ENCOUNTER (OUTPATIENT)
Dept: LAB | Age: 48
End: 2023-06-22
Attending: FAMILY MEDICINE
Payer: COMMERCIAL

## 2023-06-22 ENCOUNTER — PATIENT MESSAGE (OUTPATIENT)
Dept: FAMILY MEDICINE CLINIC | Facility: CLINIC | Age: 48
End: 2023-06-22

## 2023-06-22 DIAGNOSIS — Z00.00 WELL ADULT EXAM: ICD-10-CM

## 2023-06-22 DIAGNOSIS — E11.65 TYPE 2 DIABETES MELLITUS WITH HYPERGLYCEMIA, WITHOUT LONG-TERM CURRENT USE OF INSULIN (HCC): ICD-10-CM

## 2023-06-22 DIAGNOSIS — E66.9 OBESITY (BMI 30-39.9): ICD-10-CM

## 2023-06-22 LAB
ALBUMIN SERPL-MCNC: 3.8 G/DL (ref 3.4–5)
ALBUMIN/GLOB SERPL: 0.9 {RATIO} (ref 1–2)
ALP LIVER SERPL-CCNC: 164 U/L
ALT SERPL-CCNC: 45 U/L
ANION GAP SERPL CALC-SCNC: 5 MMOL/L (ref 0–18)
AST SERPL-CCNC: 19 U/L (ref 15–37)
BASOPHILS # BLD AUTO: 0.05 X10(3) UL (ref 0–0.2)
BASOPHILS NFR BLD AUTO: 0.6 %
BILIRUB SERPL-MCNC: 0.4 MG/DL (ref 0.1–2)
BUN BLD-MCNC: 15 MG/DL (ref 7–18)
BUN/CREAT SERPL: 14.3 (ref 10–20)
CALCIUM BLD-MCNC: 9.8 MG/DL (ref 8.5–10.1)
CHLORIDE SERPL-SCNC: 104 MMOL/L (ref 98–112)
CHOLEST SERPL-MCNC: 180 MG/DL (ref ?–200)
CO2 SERPL-SCNC: 29 MMOL/L (ref 21–32)
CREAT BLD-MCNC: 1.05 MG/DL
CREAT UR-SCNC: 273 MG/DL
DEPRECATED RDW RBC AUTO: 43.4 FL (ref 35.1–46.3)
EOSINOPHIL # BLD AUTO: 0.11 X10(3) UL (ref 0–0.7)
EOSINOPHIL NFR BLD AUTO: 1.3 %
ERYTHROCYTE [DISTWIDTH] IN BLOOD BY AUTOMATED COUNT: 13.9 % (ref 11–15)
EST. AVERAGE GLUCOSE BLD GHB EST-MCNC: 120 MG/DL (ref 68–126)
FASTING PATIENT LIPID ANSWER: YES
FASTING STATUS PATIENT QL REPORTED: YES
GFR SERPLBLD BASED ON 1.73 SQ M-ARVRAT: 66 ML/MIN/1.73M2 (ref 60–?)
GLOBULIN PLAS-MCNC: 4.4 G/DL (ref 2.8–4.4)
GLUCOSE BLD-MCNC: 98 MG/DL (ref 70–99)
HBA1C MFR BLD: 5.8 % (ref ?–5.7)
HCT VFR BLD AUTO: 42.1 %
HDLC SERPL-MCNC: 39 MG/DL (ref 40–59)
HGB BLD-MCNC: 14 G/DL
IMM GRANULOCYTES # BLD AUTO: 0.02 X10(3) UL (ref 0–1)
IMM GRANULOCYTES NFR BLD: 0.2 %
LDLC SERPL CALC-MCNC: 111 MG/DL (ref ?–100)
LYMPHOCYTES # BLD AUTO: 2.14 X10(3) UL (ref 1–4)
LYMPHOCYTES NFR BLD AUTO: 25.9 %
MCH RBC QN AUTO: 28.4 PG (ref 26–34)
MCHC RBC AUTO-ENTMCNC: 33.3 G/DL (ref 31–37)
MCV RBC AUTO: 85.4 FL
MICROALBUMIN UR-MCNC: 2.65 MG/DL
MICROALBUMIN/CREAT 24H UR-RTO: 9.7 UG/MG (ref ?–30)
MONOCYTES # BLD AUTO: 0.63 X10(3) UL (ref 0.1–1)
MONOCYTES NFR BLD AUTO: 7.6 %
NEUTROPHILS # BLD AUTO: 5.32 X10 (3) UL (ref 1.5–7.7)
NEUTROPHILS # BLD AUTO: 5.32 X10(3) UL (ref 1.5–7.7)
NEUTROPHILS NFR BLD AUTO: 64.4 %
NONHDLC SERPL-MCNC: 141 MG/DL (ref ?–130)
OSMOLALITY SERPL CALC.SUM OF ELEC: 287 MOSM/KG (ref 275–295)
PLATELET # BLD AUTO: 476 10(3)UL (ref 150–450)
POTASSIUM SERPL-SCNC: 4.8 MMOL/L (ref 3.5–5.1)
PROT SERPL-MCNC: 8.2 G/DL (ref 6.4–8.2)
RBC # BLD AUTO: 4.93 X10(6)UL
SODIUM SERPL-SCNC: 138 MMOL/L (ref 136–145)
T4 FREE SERPL-MCNC: 1 NG/DL (ref 0.8–1.7)
TRIGL SERPL-MCNC: 170 MG/DL (ref 30–149)
TSI SER-ACNC: 8.04 MIU/ML (ref 0.36–3.74)
VIT B12 SERPL-MCNC: 854 PG/ML (ref 193–986)
VIT D+METAB SERPL-MCNC: 27.3 NG/ML (ref 30–100)
VLDLC SERPL CALC-MCNC: 29 MG/DL (ref 0–30)
WBC # BLD AUTO: 8.3 X10(3) UL (ref 4–11)

## 2023-06-22 PROCEDURE — 82043 UR ALBUMIN QUANTITATIVE: CPT

## 2023-06-22 PROCEDURE — 82570 ASSAY OF URINE CREATININE: CPT

## 2023-06-22 PROCEDURE — 3044F HG A1C LEVEL LT 7.0%: CPT | Performed by: FAMILY MEDICINE

## 2023-06-22 PROCEDURE — 80061 LIPID PANEL: CPT

## 2023-06-22 PROCEDURE — 82306 VITAMIN D 25 HYDROXY: CPT

## 2023-06-22 PROCEDURE — 85025 COMPLETE CBC W/AUTO DIFF WBC: CPT

## 2023-06-22 PROCEDURE — 36415 COLL VENOUS BLD VENIPUNCTURE: CPT

## 2023-06-22 PROCEDURE — 83036 HEMOGLOBIN GLYCOSYLATED A1C: CPT

## 2023-06-22 PROCEDURE — 84443 ASSAY THYROID STIM HORMONE: CPT

## 2023-06-22 PROCEDURE — 84439 ASSAY OF FREE THYROXINE: CPT

## 2023-06-22 PROCEDURE — 82607 VITAMIN B-12: CPT

## 2023-06-22 PROCEDURE — 80053 COMPREHEN METABOLIC PANEL: CPT

## 2023-06-23 NOTE — TELEPHONE ENCOUNTER
Patricia Aden RN 6/23/2023 3:09 PM CDT        ----- Message -----  From: Fitz Orantes  Sent: 6/23/2023 10:06 AM CDT  To: Em Triage Support  Subject: Amadeo robles

## 2023-06-27 DIAGNOSIS — D75.839 THROMBOCYTOSIS: ICD-10-CM

## 2023-06-27 DIAGNOSIS — R74.8 ELEVATED ALKALINE PHOSPHATASE LEVEL: ICD-10-CM

## 2023-06-27 DIAGNOSIS — E78.00 HYPERCHOLESTEREMIA: ICD-10-CM

## 2023-06-27 DIAGNOSIS — E03.9 HYPOTHYROIDISM, UNSPECIFIED TYPE: Primary | ICD-10-CM

## 2023-06-27 RX ORDER — ATORVASTATIN CALCIUM 10 MG/1
10 TABLET, FILM COATED ORAL NIGHTLY
Qty: 90 TABLET | Refills: 3 | Status: SHIPPED | OUTPATIENT
Start: 2023-06-27

## 2023-06-27 RX ORDER — LEVOTHYROXINE SODIUM 88 UG/1
88 TABLET ORAL
Qty: 90 TABLET | Refills: 3 | Status: SHIPPED | OUTPATIENT
Start: 2023-06-27 | End: 2024-06-21

## 2023-06-29 ENCOUNTER — LAB ENCOUNTER (OUTPATIENT)
Dept: LAB | Age: 48
End: 2023-06-29
Attending: FAMILY MEDICINE
Payer: COMMERCIAL

## 2023-06-29 ENCOUNTER — OFFICE VISIT (OUTPATIENT)
Dept: FAMILY MEDICINE CLINIC | Facility: CLINIC | Age: 48
End: 2023-06-29

## 2023-06-29 VITALS
SYSTOLIC BLOOD PRESSURE: 111 MMHG | DIASTOLIC BLOOD PRESSURE: 76 MMHG | HEART RATE: 109 BPM | HEIGHT: 63.9 IN | WEIGHT: 209 LBS | TEMPERATURE: 97 F | BODY MASS INDEX: 36.12 KG/M2

## 2023-06-29 DIAGNOSIS — E11.65 TYPE 2 DIABETES MELLITUS WITH HYPERGLYCEMIA, WITHOUT LONG-TERM CURRENT USE OF INSULIN (HCC): ICD-10-CM

## 2023-06-29 DIAGNOSIS — D35.2 PITUITARY MICROADENOMA (HCC): ICD-10-CM

## 2023-06-29 DIAGNOSIS — E03.9 HYPOTHYROIDISM, UNSPECIFIED TYPE: ICD-10-CM

## 2023-06-29 DIAGNOSIS — Z97.5 IUD CONTRACEPTION: ICD-10-CM

## 2023-06-29 DIAGNOSIS — G47.33 MILD OBSTRUCTIVE SLEEP APNEA: ICD-10-CM

## 2023-06-29 DIAGNOSIS — Z00.00 WELL ADULT EXAM: Primary | ICD-10-CM

## 2023-06-29 DIAGNOSIS — E78.00 HYPERCHOLESTEREMIA: ICD-10-CM

## 2023-06-29 DIAGNOSIS — Z11.3 SCREENING EXAMINATION FOR STD (SEXUALLY TRANSMITTED DISEASE): ICD-10-CM

## 2023-06-29 DIAGNOSIS — D75.839 THROMBOCYTOSIS: ICD-10-CM

## 2023-06-29 DIAGNOSIS — Z01.419 ENCOUNTER FOR GYNECOLOGICAL EXAMINATION: ICD-10-CM

## 2023-06-29 DIAGNOSIS — F32.A ANXIETY AND DEPRESSION: ICD-10-CM

## 2023-06-29 DIAGNOSIS — J45.998 SEASONAL ASTHMA: ICD-10-CM

## 2023-06-29 DIAGNOSIS — R74.8 ELEVATED ALKALINE PHOSPHATASE LEVEL: ICD-10-CM

## 2023-06-29 DIAGNOSIS — E66.9 OBESITY (BMI 30-39.9): ICD-10-CM

## 2023-06-29 DIAGNOSIS — F41.9 ANXIETY AND DEPRESSION: ICD-10-CM

## 2023-06-29 LAB
ALBUMIN SERPL-MCNC: 3.7 G/DL (ref 3.4–5)
ALP LIVER SERPL-CCNC: 164 U/L
ALT SERPL-CCNC: 50 U/L
AST SERPL-CCNC: 23 U/L (ref 15–37)
BASOPHILS # BLD AUTO: 0.07 X10(3) UL (ref 0–0.2)
BASOPHILS NFR BLD AUTO: 0.7 %
BILIRUB DIRECT SERPL-MCNC: 0.1 MG/DL (ref 0–0.2)
BILIRUB SERPL-MCNC: 0.6 MG/DL (ref 0.1–2)
DEPRECATED RDW RBC AUTO: 42.4 FL (ref 35.1–46.3)
EOSINOPHIL # BLD AUTO: 0.12 X10(3) UL (ref 0–0.7)
EOSINOPHIL NFR BLD AUTO: 1.2 %
ERYTHROCYTE [DISTWIDTH] IN BLOOD BY AUTOMATED COUNT: 13.8 % (ref 11–15)
GGT SERPL-CCNC: 219 U/L
HBV SURFACE AG SER-ACNC: <0.1 [IU]/L
HBV SURFACE AG SERPL QL IA: NONREACTIVE
HCT VFR BLD AUTO: 41.8 %
HCV AB SERPL QL IA: NONREACTIVE
HGB BLD-MCNC: 14.2 G/DL
IMM GRANULOCYTES # BLD AUTO: 0.02 X10(3) UL (ref 0–1)
IMM GRANULOCYTES NFR BLD: 0.2 %
LYMPHOCYTES # BLD AUTO: 2.38 X10(3) UL (ref 1–4)
LYMPHOCYTES NFR BLD AUTO: 23.1 %
MCH RBC QN AUTO: 28.3 PG (ref 26–34)
MCHC RBC AUTO-ENTMCNC: 34 G/DL (ref 31–37)
MCV RBC AUTO: 83.4 FL
MONOCYTES # BLD AUTO: 0.73 X10(3) UL (ref 0.1–1)
MONOCYTES NFR BLD AUTO: 7.1 %
NEUTROPHILS # BLD AUTO: 6.97 X10 (3) UL (ref 1.5–7.7)
NEUTROPHILS # BLD AUTO: 6.97 X10(3) UL (ref 1.5–7.7)
NEUTROPHILS NFR BLD AUTO: 67.7 %
PLATELET # BLD AUTO: 525 10(3)UL (ref 150–450)
PROLACTIN SERPL-MCNC: 34.1 NG/ML
PROT SERPL-MCNC: 8.2 G/DL (ref 6.4–8.2)
RBC # BLD AUTO: 5.01 X10(6)UL
WBC # BLD AUTO: 10.3 X10(3) UL (ref 4–11)

## 2023-06-29 PROCEDURE — 3074F SYST BP LT 130 MM HG: CPT | Performed by: FAMILY MEDICINE

## 2023-06-29 PROCEDURE — 3078F DIAST BP <80 MM HG: CPT | Performed by: FAMILY MEDICINE

## 2023-06-29 PROCEDURE — 85025 COMPLETE CBC W/AUTO DIFF WBC: CPT

## 2023-06-29 PROCEDURE — 87389 HIV-1 AG W/HIV-1&-2 AB AG IA: CPT

## 2023-06-29 PROCEDURE — 82977 ASSAY OF GGT: CPT

## 2023-06-29 PROCEDURE — 99214 OFFICE O/P EST MOD 30 MIN: CPT | Performed by: FAMILY MEDICINE

## 2023-06-29 PROCEDURE — 86780 TREPONEMA PALLIDUM: CPT

## 2023-06-29 PROCEDURE — 80076 HEPATIC FUNCTION PANEL: CPT

## 2023-06-29 PROCEDURE — 86803 HEPATITIS C AB TEST: CPT

## 2023-06-29 PROCEDURE — 36415 COLL VENOUS BLD VENIPUNCTURE: CPT

## 2023-06-29 PROCEDURE — 99396 PREV VISIT EST AGE 40-64: CPT | Performed by: FAMILY MEDICINE

## 2023-06-29 PROCEDURE — 3008F BODY MASS INDEX DOCD: CPT | Performed by: FAMILY MEDICINE

## 2023-06-29 PROCEDURE — 87340 HEPATITIS B SURFACE AG IA: CPT

## 2023-06-29 PROCEDURE — 84080 ASSAY ALKALINE PHOSPHATASES: CPT

## 2023-06-29 PROCEDURE — 84146 ASSAY OF PROLACTIN: CPT

## 2023-06-29 RX ORDER — MELATONIN
1000 DAILY
COMMUNITY

## 2023-06-29 RX ORDER — ALBUTEROL SULFATE 90 UG/1
2 AEROSOL, METERED RESPIRATORY (INHALATION) EVERY 6 HOURS PRN
Qty: 1 EACH | Refills: 1 | Status: SHIPPED | OUTPATIENT
Start: 2023-06-29

## 2023-06-30 LAB
C TRACH DNA SPEC QL NAA+PROBE: NEGATIVE
N GONORRHOEA DNA SPEC QL NAA+PROBE: NEGATIVE
T PALLIDUM AB SER QL: NEGATIVE

## 2023-07-03 ENCOUNTER — PATIENT MESSAGE (OUTPATIENT)
Dept: ENDOCRINOLOGY CLINIC | Facility: CLINIC | Age: 48
End: 2023-07-03

## 2023-07-05 ENCOUNTER — OFFICE VISIT (OUTPATIENT)
Dept: ENDOCRINOLOGY CLINIC | Facility: CLINIC | Age: 48
End: 2023-07-05

## 2023-07-05 VITALS
SYSTOLIC BLOOD PRESSURE: 107 MMHG | DIASTOLIC BLOOD PRESSURE: 73 MMHG | HEIGHT: 63.5 IN | WEIGHT: 210 LBS | BODY MASS INDEX: 36.75 KG/M2 | HEART RATE: 96 BPM

## 2023-07-05 DIAGNOSIS — E03.9 HYPOTHYROIDISM, UNSPECIFIED TYPE: ICD-10-CM

## 2023-07-05 DIAGNOSIS — E11.65 TYPE 2 DIABETES MELLITUS WITH HYPERGLYCEMIA, WITHOUT LONG-TERM CURRENT USE OF INSULIN (HCC): Primary | ICD-10-CM

## 2023-07-05 DIAGNOSIS — D35.2 PITUITARY MICROADENOMA (HCC): ICD-10-CM

## 2023-07-05 DIAGNOSIS — E78.5 DYSLIPIDEMIA: ICD-10-CM

## 2023-07-05 DIAGNOSIS — I10 HYPERTENSION, UNSPECIFIED TYPE: ICD-10-CM

## 2023-07-05 LAB
GLUCOSE BLOOD: 108
TEST STRIP LOT #: NORMAL NUMERIC

## 2023-07-05 PROCEDURE — 3078F DIAST BP <80 MM HG: CPT | Performed by: INTERNAL MEDICINE

## 2023-07-05 PROCEDURE — 3008F BODY MASS INDEX DOCD: CPT | Performed by: INTERNAL MEDICINE

## 2023-07-05 PROCEDURE — 82947 ASSAY GLUCOSE BLOOD QUANT: CPT | Performed by: INTERNAL MEDICINE

## 2023-07-05 PROCEDURE — 99214 OFFICE O/P EST MOD 30 MIN: CPT | Performed by: INTERNAL MEDICINE

## 2023-07-05 PROCEDURE — 3074F SYST BP LT 130 MM HG: CPT | Performed by: INTERNAL MEDICINE

## 2023-07-05 NOTE — PROGRESS NOTES
Name: Gricel Fletcher  Date: 7/05/23    Referring Physician: No ref. provider found    HISTORY OF PRESENT ILLNESS   Gricel Fletcher is a 49year old female who presents for evaluation and management of type 2 diabetes. She was diagnosed with diabetes recently, but had pre-diabetes for some time. At the last visit, I had started her on Mounjaro 2.5mg qweekly and kept her on the metformin. She is currently taking 5mg of this qweekly. She does feel occasional nausea when taking the [facility]. She feels that she is losing weight appropriately. She is also taking phentermine as well as topiramate. She also has a history of a pituitary microadenoma that was diagnosed in 2015 when she was having irregular menstrual cycles and elevated prolactin was discovered. She has never had a repeat pituitary microadenoma.  She has an IUD now and does think that she is close to menopausal.     Blood Glucose Today: 108  HbA1C or glycohemoglobin is 5.8 on 6/22/23 and it was 6.7 on 11/01/22  Type 1 or Type 2?: Type 2  Medications for DM: metformin 500mg PO qday; Mounjaro 5mg qweekly   Checking a few times per month  Fasting- below 100  2 hours after eating- 2 hours after eating- 120s  Episodes of hypoglycemia: none    Dietary compliance: she is eating healthy    Exercise: she is trying to exercise    Polyuria/polydipsia:     Blurred vision: no    Flu Vaccine This Season: yes    Covid Vaccine: yes    REVIEW OF SYSTEMS  CV: Cardiovascular disease present: none         Hypertension present: yes, at goal, on meds         Hyperlipidemia present: not at goal, on meds (6/22/23)         Peripheral Vascular Disease present: none    : Nephropathy present: MAC: none (6/22/23) Creatinine: 1.05     Neuro: Neuropathy present: none    Eyes: Diabetic retinopathy present: no            Most recent visit to eye doctor in last 12 months: 2/28/23    Skin: Infection or ulceration: none    Osteoporosis/ Osteopenia: none Vitamin D: 27.3 (6/22/23)    Thyroid disease: yes TSH: 8.040 (6/22/23); LT4 88mcg on Saturday and Sunday and 75mcg Monday-Friday- after these labs, her PCP asked her to just take 88mcg daily. Medications:     Current Outpatient Medications:     cholecalciferol 25 MCG (1000 UT) Oral Tab, Take 1 tablet (1,000 Units total) by mouth daily. , Disp: , Rfl:     albuterol (PROVENTIL HFA) 108 (90 Base) MCG/ACT Inhalation Aero Soln, Inhale 2 puffs into the lungs every 6 (six) hours as needed for Wheezing., Disp: 1 each, Rfl: 1    atorvastatin 10 MG Oral Tab, Take 1 tablet (10 mg total) by mouth nightly., Disp: 90 tablet, Rfl: 3    levothyroxine 88 MCG Oral Tab, Take 1 tablet (88 mcg total) by mouth before breakfast., Disp: 90 tablet, Rfl: 3    PARoxetine HCl 40 MG Oral Tab, Take 1 tablet (40 mg total) by mouth every morning., Disp: 90 tablet, Rfl: 0    MOUNJARO 5 MG/0.5ML Subcutaneous Solution Pen-injector, INJECT 5 MG INTO THE SKIN ONCE A WEEK, Disp: 2 mL, Rfl: 0    Phentermine HCl 37.5 MG Oral Tab, Take 1 tablet (37.5 mg total) by mouth before breakfast., Disp: 30 tablet, Rfl: 2    Glucose Blood (CONTOUR NEXT TEST) In Vitro Strip, Check blood sugar once daily, Disp: 100 strip, Rfl: 3    Microlet Lancets Does not apply Misc, Use to test blood sugar once daily. , Disp: 100 each, Rfl: 3    OMEPRAZOLE 40 MG Oral Capsule Delayed Release, TAKE 1 CAPSULE(40 MG) BY MOUTH DAILY, Disp: 90 capsule, Rfl: 1    metFORMIN  MG Oral Tablet 24 Hr, Take 1 tablet (500 mg total) by mouth daily. , Disp: 90 tablet, Rfl: 3    lubiprostone 8 MCG Oral Cap, Take 1 capsule (8 mcg total) by mouth 2 (two) times daily with meals. , Disp: 60 capsule, Rfl: 5    HYDROCHLOROTHIAZIDE 12.5 MG Oral Cap, TAKE 1 CAPSULE(12.5 MG) BY MOUTH DAILY, Disp: 90 capsule, Rfl: 0    Plecanatide (TRULANCE) 3 MG Oral Tab, Take 1 tablet by mouth daily. , Disp: 90 tablet, Rfl: 3    MIRENA, 52 MG, 20 MCG/24HR Intrauterine IUD, , Disp: , Rfl: 0     Allergies:     Amoxicillin HIVES  Potassium               HIVES  Vancomycin              ITCHING    Social History:   Social History     Socioeconomic History    Marital status: Single   Tobacco Use    Smoking status: Never    Smokeless tobacco: Never   Vaping Use    Vaping Use: Never used   Substance and Sexual Activity    Alcohol use: Yes     Comment: socially     Drug use: No    Sexual activity: Yes     Birth control/protection: I.U.D. Other Topics Concern    Caffeine Concern Yes     Comment: soda, tea, 8 oz daily    Pt has a pacemaker No    Pt has a defibrillator No    Reaction to local anesthetic No       Medical History:   Past Medical History:   Diagnosis Date    Anxiety 2013    Zoloft    Asthma     Benign tumor of pituitary gland ([address]     Bulimia nervosa in remission     Chest pain 2011    hospitalize    Depression     mes--stopped on own    GERD (gastroesophageal reflux disease)     Hypercholesteremia     Hypercholesteremia     IBS (irritable bowel syndrome)     Insomnia 2013    Zoloft    MRSA (methicillin resistant Staphylococcus aureus) 2012    Obesity (BMI 30-39.9)     MATILDE (obstructive sleep apnea)     PMDD (premenstrual dysphoric disorder) 2013    Zoloft    Pre-diabetes     Thyroid disease     Vertigo        Surgical history:   Past Surgical History:   Procedure Laterality Date    COLONOSCOPY  2017    [place]    EGD  06/29/2022    TUBAL LIGATION      UPPER GI ENDOSCOPY,EXAM  2011    EGD       PHYSICAL EXAM   07/05/23  1636   BP: 107/73   Pulse: 96   Weight: 210 lb (95.3 kg)   Height: 5' 3.5\" (1.613 m)         General Appearance:  alert, well developed, in no acute distress  Eyes:  normal conjunctivae, sclera. , normal sclera and normal pupils  Neuro:  sensory grossly intact and motor grossly intact, normal monofilament exam  Psychiatric:  oriented to time, self, and place  Nutritional:  no abnormal weight gain or loss    Lab Data:   Lab Results   Component Value Date     06/22/2023    A1C 5.8 (H) 06/22/2023     Lab Results Component Value Date    GLU 98 06/22/2023    BUN 15 06/22/2023    BUNCREA 14.3 06/22/2023    CREATSERUM 1.05 (H) 06/22/2023    ANIONGAP 5 06/22/2023    GFRNAA 77 06/20/2022    GFRAA 89 06/20/2022    CA 9.8 06/22/2023    OSMOCALC 287 06/22/2023    ALKPHO 164 (H) 06/29/2023    AST 23 06/29/2023    ALT 50 06/29/2023    ALKPHOS 72 03/11/2014    BILT 0.6 06/29/2023    TP 8.2 06/29/2023    ALB 3.7 06/29/2023    GLOBULIN 4.4 06/22/2023    AGRATIO 1.2 03/11/2014     06/22/2023    K 4.8 06/22/2023     06/22/2023    CO2 29.0 06/22/2023     Lab Results   Component Value Date    CHOLEST 180 06/22/2023    TRIG 170 (H) 06/22/2023    HDL 39 (L) 06/22/2023     (H) 06/22/2023    VLDL 29 06/22/2023    NONHDLC 141 (H) 06/22/2023    CALCNONHDL 183 (H) 08/26/2013     Lab Results   Component Value Date    MALBP 2.65 06/22/2023    CREUR 273.00 06/22/2023       ASSESSMENT/PLAN:  This is a 49year-old woman here for evaluation and management of type 2 diabetes. We discussed the ABCs of DM.     1.) Hyperglycemia Management- We discussed the importance of glycemic control to prevent complications of diabetes. We discussed the importance of SBGM. I offered and provided patient education materials and offered a blood glucose log book. - Stop metformin to see if this helps the nausea  - Continue Mounjaro 5.0mg qweekly     2.) Management of Diabetic Complications- We discussed the complications of diabetes include retinopathy, neuropathy, nephropathy and cardiovascular disease. - Ophtho- up to date  - Flu and Covid vaccine- up to date  - BP- at goal, on meds  - Lipids- check in 3 months  - MAC- check in 3 months  - CMP- check in 3 months  - Neuropathy- none  - CAD- none    3.) Lifestyle Management for Diabetes- We discussed importance of a low CHO diet, and recommend 45gm per meal or 135gm per day.  We discussed the importance of trying to follow a Mediterranean diet, with an emphasis on vegetables at every meal, with lots whole grains, and protein from either plant-based sources, or poultry and fish. - Diet- I have given her the 'the diabetes plate'  - Exercise- she will try to increase this     4.) Hypothyroidism- I have confirmed that she is taking the medication in the correct manner and she is not missing doses. I would like to change the levothyroxine to Unithroid and keep the same dose and check again in 3 months.     5.) History of Pituitary Microadenoma with mildly elevated prolactin  - She will have a repeat pituitary MRI soon  - I would like to repeat all pituitary labs in 3 months    Return to clinic in 3 months    Prior to this encounter, I spent over 15 minutes with preparing for the visit, including reviewing documents from other specialties as well as from PCP and going over test results and imaging studies. During the face to face encounter, I spent an additional 15 minutes which were determined for follow-up. Greater than 50% of the time was spent in counseling, anticipatory guidance, and coordination of care. Patient concerns were answered to the best of my knowledge. 7/05/23  Hanna Cobos MD

## 2023-07-06 LAB — ALKALINE PHOSPHATASE BONE SPECIFIC: 23.1 UG/L

## 2023-07-06 RX ORDER — TIRZEPATIDE 5 MG/.5ML
5 INJECTION, SOLUTION SUBCUTANEOUS WEEKLY
Qty: 6 ML | Refills: 0 | Status: SHIPPED | OUTPATIENT
Start: 2023-07-06 | End: 2023-10-04

## 2023-07-06 RX ORDER — LEVOTHYROXINE SODIUM 88 UG/1
88 TABLET ORAL
Qty: 90 TABLET | Refills: 0 | Status: SHIPPED | OUTPATIENT
Start: 2023-07-06 | End: 2023-10-04

## 2023-07-12 ENCOUNTER — HOSPITAL ENCOUNTER (OUTPATIENT)
Dept: ULTRASOUND IMAGING | Age: 48
Discharge: HOME | End: 2023-07-12
Attending: FAMILY MEDICINE
Payer: MEDICAID

## 2023-07-12 DIAGNOSIS — R74.8 ELEVATED ALKALINE PHOSPHATASE LEVEL: ICD-10-CM

## 2023-07-12 PROCEDURE — 76705 ECHO EXAM OF ABDOMEN: CPT | Performed by: FAMILY MEDICINE

## 2023-07-13 ENCOUNTER — NURSE TRIAGE (OUTPATIENT)
Dept: FAMILY MEDICINE CLINIC | Facility: CLINIC | Age: 48
End: 2023-07-13

## 2023-07-13 DIAGNOSIS — R74.8 ELEVATED ALKALINE PHOSPHATASE LEVEL: Primary | ICD-10-CM

## 2023-07-13 DIAGNOSIS — D75.839 THROMBOCYTOSIS: ICD-10-CM

## 2023-07-13 LAB
HPV I/H RISK 1 DNA SPEC QL NAA+PROBE: NEGATIVE
LAST PAP RESULT: NORMAL

## 2023-07-13 NOTE — TELEPHONE ENCOUNTER
Action Requested: Summary for Provider     []  Critical Lab, Recommendations Needed  [x] Need Additional Advice  []   FYI    []   Need Orders  [] Need Medications Sent to Pharmacy  []  Other     SUMMARY: Patient states she has only slept 7 hours since Monday. Patient states she is afraid to take any over the counter Melatonin or sleep aids unless  recommends it. Please advise. Patient states she was recently seen in the office    Discussed with patient to avoid screen time(phone, tv) 1 hour before bedtime. Limit caffeine and alcohol. Discussed trying to do something relaxing before bed,white noise. Reason for call: Insomnia    Reason for Disposition   Difficulty falling to sleep or staying asleep    Protocols used:  Insomnia-A-[place]

## 2023-07-13 NOTE — TELEPHONE ENCOUNTER
Patient notified of provider's recommendation. Patient verbalized understanding.     F/u appt made 7/18/23

## 2023-07-14 ENCOUNTER — TELEPHONE (OUTPATIENT)
Dept: FAMILY MEDICINE CLINIC | Facility: CLINIC | Age: 48
End: 2023-07-14

## 2023-07-14 DIAGNOSIS — E66.9 OBESITY (BMI 30-39.9): ICD-10-CM

## 2023-07-14 NOTE — TELEPHONE ENCOUNTER
Patient contacted and informed of results as stated below. States she has already completed liver US on 7/12/23. Asking if based on findings, recommendation still the same to see GI or if any new advice? Patient also states as advised she purchased OTC melatonin 5mg. States the bottle states to only take one at night. Patient did and states she was still unable to sleep last night. Asking if she can take 2 tablets (10mg) tonight? Does have f/u appt for Tuesday to further discuss.   Future Appointments   Date Time Provider Rosemarie Fam   7/18/2023 11:00 AM Alex Javed MD [facility]

## 2023-07-14 NOTE — TELEPHONE ENCOUNTER
----- Message -----  From: Kiki Martins MD  Sent: 7/13/2023  11:39 PM CDT  To: Em Rn Triage    Alkaline phosphatase levels elevated and GGT elevated which are both from liver. Please complete liver ultrasound and follow up with gastroenterology  Avoid fatty foods and alcohol. Platelets elevated?  Unclear etiology  Recommend recheck 1 month  I see you saw endocrinology already  Other labs good

## 2023-07-17 RX ORDER — PHENTERMINE HYDROCHLORIDE 37.5 MG/1
37.5 TABLET ORAL
Qty: 30 TABLET | Refills: 0 | Status: SHIPPED | OUTPATIENT
Start: 2023-07-17

## 2023-07-19 ENCOUNTER — TELEPHONE (OUTPATIENT)
Dept: ENDOCRINOLOGY CLINIC | Facility: CLINIC | Age: 48
End: 2023-07-19

## 2023-07-19 DIAGNOSIS — E11.65 TYPE 2 DIABETES MELLITUS WITH HYPERGLYCEMIA, WITHOUT LONG-TERM CURRENT USE OF INSULIN (HCC): Primary | ICD-10-CM

## 2023-07-19 NOTE — TELEPHONE ENCOUNTER
PRIOR AUTH STARTED FOR       Tirzepatide [facility]) 5 MG/0.5ML Subcutaneous Solution Pen-injector, Inject 5 mg into the skin once a week., Disp: 6 mL, Rfl: 0    KEY: [id number]

## 2023-07-20 NOTE — TELEPHONE ENCOUNTER
Medication PA Requested: irzepatide (MOUNJARO) 5 MG/0.5ML Subcutaneous Solution Pen-injector                                                          CoverMyMeds Used:  Key:   Quantity: 6 mL   Day Supply: 90 days   Sig: Inject 5 mg into the skin once a week   DX Code: E11.65                                     CPT code (if applicable):   Case Number/Pending Ref#:

## 2023-07-24 NOTE — TELEPHONE ENCOUNTER
Patient checking status on prior auth. Patient states she is completely out of Mounjaro. Please call. Thank you.

## 2023-07-24 NOTE — TELEPHONE ENCOUNTER
Medication PA Requested: irzepatide Lorene Amato 5 MG/0.5ML Subcutaneous Solution Pen-injector                                                          CoverMyMeds Used:  Key:   Quantity: 6 mL   Day Supply: 90 days   Sig: Inject 5 mg into the skin once a week   DX Code: E11.65                                         FAXED HFS pa form with LOV 7/5, A1c 11/2022 & 6/2023  Awaiting determination

## 2023-07-25 NOTE — TELEPHONE ENCOUNTER
Received a fax from [facility] in regards to the [facility] - JENI Inj 5 MG/0.5, the PA was denied. They stated, \"Deny Reason --- The request was denied because other equally effective therapies are available without prior authorization. Use Preferred Agent: Metformin, Glipizide, Glyburide, Tradjenta, Januvia, Pioglitazone, Invokana, Farxiga, Jardiance, Victoza, Trulicity, and Insulins (Lantus, Levemir, Humalog, Humilin). \"      Routing to provider to inform of denial. Denial letter placed in red denial folder. [facility] was sent to pt

## 2023-07-26 RX ORDER — DULAGLUTIDE 1.5 MG/.5ML
1.5 INJECTION, SOLUTION SUBCUTANEOUS WEEKLY
Qty: 6 ML | Refills: 0 | Status: SHIPPED | OUTPATIENT
Start: 2023-07-26 | End: 2023-10-24

## 2023-07-26 NOTE — TELEPHONE ENCOUNTER
Current Outpatient Medications   Medication Sig Dispense Refill    OMEPRAZOLE 40 MG Oral Capsule Delayed Release TAKE 1 CAPSULE(40 MG) BY MOUTH DAILY 90 capsule 1

## 2023-07-26 NOTE — TELEPHONE ENCOUNTER
Hi!  Please let patient know that I would like to start patient on Trulicity 0.5KE qweekly. This may not be as effective as Mounjaro, but it will definitely be more effective than metformin and will be very effective in helping her meet her weight loss goals. Thank you!

## 2023-07-26 NOTE — TELEPHONE ENCOUNTER
Medication Quantity Refills Start End   OMEPRAZOLE 40 MG Oral Capsule Delayed Release 90 capsule 1 1/3/2023    Sig:   TAKE 1 CAPSULE(40 MG) BY MOUTH DAILY     Route:   (none)     Order #:   [id number]         LOV: 5/5/22    LR: 1/3/23

## 2023-07-27 RX ORDER — OMEPRAZOLE 40 MG/1
40 CAPSULE, DELAYED RELEASE ORAL DAILY
Qty: 90 CAPSULE | Refills: 1 | Status: SHIPPED | OUTPATIENT
Start: 2023-07-27

## 2023-07-30 ENCOUNTER — HOSPITAL ENCOUNTER (OUTPATIENT)
Dept: MRI IMAGING | Facility: HOSPITAL | Age: 48
Discharge: HOME | End: 2023-07-30
Attending: FAMILY MEDICINE
Payer: MEDICAID

## 2023-07-30 ENCOUNTER — HOSPITAL ENCOUNTER (OUTPATIENT)
Dept: MRI IMAGING | Facility: HOSPITAL | Age: 48
End: 2023-07-30
Attending: FAMILY MEDICINE
Payer: MEDICAID

## 2023-07-30 DIAGNOSIS — D35.2 PITUITARY MICROADENOMA (HCC): ICD-10-CM

## 2023-07-30 PROCEDURE — 70553 MRI BRAIN STEM W/O & W/DYE: CPT | Performed by: FAMILY MEDICINE

## 2023-07-30 PROCEDURE — A9575 INJ GADOTERATE MEGLUMI 0.1ML: HCPCS | Performed by: FAMILY MEDICINE

## 2023-07-30 RX ORDER — GADOTERATE MEGLUMINE 376.9 MG/ML
20 INJECTION INTRAVENOUS
Status: COMPLETED | OUTPATIENT
Start: 2023-07-30 | End: 2023-07-30

## 2023-07-30 RX ADMIN — GADOTERATE MEGLUMINE 20 ML: 376.9 INJECTION INTRAVENOUS at 13:25:00

## 2023-08-18 DIAGNOSIS — E66.9 OBESITY (BMI 30-39.9): ICD-10-CM

## 2023-08-18 RX ORDER — PHENTERMINE HYDROCHLORIDE 37.5 MG/1
37.5 TABLET ORAL
Qty: 30 TABLET | Refills: 0 | Status: SHIPPED | OUTPATIENT
Start: 2023-08-18

## 2023-08-28 ENCOUNTER — APPOINTMENT (OUTPATIENT)
Dept: CT IMAGING | Facility: HOSPITAL | Age: 48
End: 2023-08-28
Attending: EMERGENCY MEDICINE
Payer: MEDICAID

## 2023-08-28 ENCOUNTER — HOSPITAL ENCOUNTER (EMERGENCY)
Facility: HOSPITAL | Age: 48
Discharge: HOME | End: 2023-08-28
Attending: EMERGENCY MEDICINE
Payer: MEDICAID

## 2023-08-28 ENCOUNTER — APPOINTMENT (OUTPATIENT)
Dept: GENERAL RADIOLOGY | Facility: HOSPITAL | Age: 48
End: 2023-08-28
Attending: EMERGENCY MEDICINE
Payer: MEDICAID

## 2023-08-28 VITALS
HEART RATE: 96 BPM | RESPIRATION RATE: 20 BRPM | SYSTOLIC BLOOD PRESSURE: 124 MMHG | DIASTOLIC BLOOD PRESSURE: 75 MMHG | HEIGHT: 63.5 IN | OXYGEN SATURATION: 99 % | BODY MASS INDEX: 36.22 KG/M2 | TEMPERATURE: 97 F | WEIGHT: 207 LBS

## 2023-08-28 DIAGNOSIS — R42 DIZZINESS: ICD-10-CM

## 2023-08-28 DIAGNOSIS — E86.0 DEHYDRATION: ICD-10-CM

## 2023-08-28 DIAGNOSIS — R11.2 NAUSEA AND VOMITING, UNSPECIFIED VOMITING TYPE: Primary | ICD-10-CM

## 2023-08-28 LAB
ALBUMIN SERPL-MCNC: 3.7 G/DL (ref 3.4–5)
ALBUMIN/GLOB SERPL: 0.9 {RATIO} (ref 1–2)
ALP LIVER SERPL-CCNC: 175 U/L
ALT SERPL-CCNC: 41 U/L
ANION GAP SERPL CALC-SCNC: 8 MMOL/L (ref 0–18)
AST SERPL-CCNC: 15 U/L (ref 15–37)
ATRIAL RATE: 85 BPM
BASE EXCESS BLD CALC-SCNC: 2.5 MMOL/L (ref ?–2)
BASOPHILS # BLD AUTO: 0.05 X10(3) UL (ref 0–0.2)
BASOPHILS NFR BLD AUTO: 0.6 %
BILIRUB SERPL-MCNC: 0.5 MG/DL (ref 0.1–2)
BUN BLD-MCNC: 12 MG/DL (ref 7–18)
BUN/CREAT SERPL: 10.7 (ref 10–20)
CALCIUM BLD-MCNC: 9 MG/DL (ref 8.5–10.1)
CHLORIDE SERPL-SCNC: 109 MMOL/L (ref 98–112)
CO2 SERPL-SCNC: 24 MMOL/L (ref 21–32)
CREAT BLD-MCNC: 1.12 MG/DL
DEPRECATED RDW RBC AUTO: 43.3 FL (ref 35.1–46.3)
EGFRCR SERPLBLD CKD-EPI 2021: 61 ML/MIN/1.73M2 (ref 60–?)
EOSINOPHIL # BLD AUTO: 0.07 X10(3) UL (ref 0–0.7)
EOSINOPHIL NFR BLD AUTO: 0.8 %
ERYTHROCYTE [DISTWIDTH] IN BLOOD BY AUTOMATED COUNT: 13.6 % (ref 11–15)
ETHANOL SERPL-MCNC: <3 MG/DL (ref ?–3)
GLOBULIN PLAS-MCNC: 3.9 G/DL (ref 2.8–4.4)
GLUCOSE BLD-MCNC: 114 MG/DL (ref 70–99)
GLUCOSE BLDC GLUCOMTR-MCNC: 108 MG/DL (ref 70–99)
HCO3 BLDA-SCNC: 26.7 MEQ/L (ref 21–27)
HCT VFR BLD AUTO: 39.7 %
HGB BLD-MCNC: 13.4 G/DL
IMM GRANULOCYTES # BLD AUTO: 0.03 X10(3) UL (ref 0–1)
IMM GRANULOCYTES NFR BLD: 0.3 %
LIPASE SERPL-CCNC: 26 U/L (ref 13–75)
LYMPHOCYTES # BLD AUTO: 1.51 X10(3) UL (ref 1–4)
LYMPHOCYTES NFR BLD AUTO: 17.3 %
MAGNESIUM SERPL-MCNC: 1.9 MG/DL (ref 1.6–2.6)
MCH RBC QN AUTO: 29.6 PG (ref 26–34)
MCHC RBC AUTO-ENTMCNC: 33.8 G/DL (ref 31–37)
MCV RBC AUTO: 87.6 FL
MODIFIED ALLEN TEST: POSITIVE
MONOCYTES # BLD AUTO: 0.56 X10(3) UL (ref 0.1–1)
MONOCYTES NFR BLD AUTO: 6.4 %
NEUTROPHILS # BLD AUTO: 6.53 X10 (3) UL (ref 1.5–7.7)
NEUTROPHILS # BLD AUTO: 6.53 X10(3) UL (ref 1.5–7.7)
NEUTROPHILS NFR BLD AUTO: 74.6 %
O2 CT BLD-SCNC: 16.8 VOL% (ref 15–23)
OSMOLALITY SERPL CALC.SUM OF ELEC: 293 MOSM/KG (ref 275–295)
P AXIS: 44 DEGREES
P-R INTERVAL: 196 MS
PCO2 BLDA: 37 MM HG (ref 35–45)
PH BLDA: 7.46 [PH] (ref 7.35–7.45)
PLATELET # BLD AUTO: 367 10(3)UL (ref 150–450)
PO2 BLDA: 61 MM HG (ref 80–100)
POTASSIUM SERPL-SCNC: 3.4 MMOL/L (ref 3.5–5.1)
PROT SERPL-MCNC: 7.6 G/DL (ref 6.4–8.2)
PUNCTURE CHARGE: YES
Q-T INTERVAL: 382 MS
QRS DURATION: 84 MS
QTC CALCULATION (BEZET): 454 MS
R AXIS: 51 DEGREES
RBC # BLD AUTO: 4.53 X10(6)UL
SAO2 % BLDA: 95.5 % (ref 94–100)
SODIUM SERPL-SCNC: 141 MMOL/L (ref 136–145)
T AXIS: 62 DEGREES
VENTRICULAR RATE: 85 BPM
WBC # BLD AUTO: 8.8 X10(3) UL (ref 4–11)

## 2023-08-28 PROCEDURE — 99285 EMERGENCY DEPT VISIT HI MDM: CPT

## 2023-08-28 PROCEDURE — 93005 ELECTROCARDIOGRAM TRACING: CPT

## 2023-08-28 PROCEDURE — 70450 CT HEAD/BRAIN W/O DYE: CPT | Performed by: EMERGENCY MEDICINE

## 2023-08-28 PROCEDURE — C9113 INJ PANTOPRAZOLE SODIUM, VIA: HCPCS | Performed by: EMERGENCY MEDICINE

## 2023-08-28 PROCEDURE — 71260 CT THORAX DX C+: CPT | Performed by: EMERGENCY MEDICINE

## 2023-08-28 PROCEDURE — 93010 ELECTROCARDIOGRAM REPORT: CPT

## 2023-08-28 PROCEDURE — 82077 ASSAY SPEC XCP UR&BREATH IA: CPT | Performed by: EMERGENCY MEDICINE

## 2023-08-28 PROCEDURE — 85025 COMPLETE CBC W/AUTO DIFF WBC: CPT | Performed by: EMERGENCY MEDICINE

## 2023-08-28 PROCEDURE — 82805 BLOOD GASES W/O2 SATURATION: CPT | Performed by: EMERGENCY MEDICINE

## 2023-08-28 PROCEDURE — 80053 COMPREHEN METABOLIC PANEL: CPT | Performed by: EMERGENCY MEDICINE

## 2023-08-28 PROCEDURE — 36600 WITHDRAWAL OF ARTERIAL BLOOD: CPT | Performed by: EMERGENCY MEDICINE

## 2023-08-28 PROCEDURE — 71045 X-RAY EXAM CHEST 1 VIEW: CPT | Performed by: EMERGENCY MEDICINE

## 2023-08-28 PROCEDURE — 83690 ASSAY OF LIPASE: CPT | Performed by: EMERGENCY MEDICINE

## 2023-08-28 PROCEDURE — 96374 THER/PROPH/DIAG INJ IV PUSH: CPT

## 2023-08-28 PROCEDURE — 83735 ASSAY OF MAGNESIUM: CPT | Performed by: EMERGENCY MEDICINE

## 2023-08-28 PROCEDURE — 82962 GLUCOSE BLOOD TEST: CPT

## 2023-08-28 PROCEDURE — 96375 TX/PRO/DX INJ NEW DRUG ADDON: CPT

## 2023-08-28 PROCEDURE — 74177 CT ABD & PELVIS W/CONTRAST: CPT | Performed by: EMERGENCY MEDICINE

## 2023-08-28 RX ORDER — ONDANSETRON 2 MG/ML
INJECTION INTRAMUSCULAR; INTRAVENOUS
Status: COMPLETED
Start: 2023-08-28 | End: 2023-08-28

## 2023-08-28 RX ORDER — ONDANSETRON 2 MG/ML
4 INJECTION INTRAMUSCULAR; INTRAVENOUS ONCE
Status: COMPLETED | OUTPATIENT
Start: 2023-08-28 | End: 2023-08-28

## 2023-08-28 RX ORDER — DROPERIDOL 2.5 MG/ML
2.5 INJECTION, SOLUTION INTRAMUSCULAR; INTRAVENOUS ONCE
Status: COMPLETED | OUTPATIENT
Start: 2023-08-28 | End: 2023-08-28

## 2023-08-28 RX ORDER — OMEPRAZOLE 20 MG/1
20 CAPSULE, DELAYED RELEASE ORAL DAILY
Qty: 14 CAPSULE | Refills: 0 | Status: SHIPPED | OUTPATIENT
Start: 2023-08-28 | End: 2023-09-11

## 2023-08-28 RX ORDER — ONDANSETRON 4 MG/1
4 TABLET, ORALLY DISINTEGRATING ORAL EVERY 8 HOURS PRN
Qty: 6 TABLET | Refills: 0 | Status: SHIPPED | OUTPATIENT
Start: 2023-08-28 | End: 2023-09-01

## 2023-08-28 RX ORDER — DIAZEPAM 5 MG/ML
2.5 INJECTION, SOLUTION INTRAMUSCULAR; INTRAVENOUS ONCE
Status: COMPLETED | OUTPATIENT
Start: 2023-08-28 | End: 2023-08-28

## 2023-08-28 RX ORDER — MECLIZINE HCL 25MG 25 MG/1
25 TABLET, CHEWABLE ORAL 3 TIMES DAILY PRN
Qty: 15 TABLET | Refills: 0 | Status: SHIPPED | OUTPATIENT
Start: 2023-08-28 | End: 2023-09-01

## 2023-08-28 RX ORDER — METOCLOPRAMIDE HYDROCHLORIDE 5 MG/ML
5 INJECTION INTRAMUSCULAR; INTRAVENOUS ONCE
Status: COMPLETED | OUTPATIENT
Start: 2023-08-28 | End: 2023-08-28

## 2023-08-28 NOTE — ED QUICK NOTES
Patient ambulated to and from bathroom, and down the tan with EDT @ patient side in case of imbalance. Patient's only complaint was nausea.

## 2023-08-28 NOTE — ED INITIAL ASSESSMENT (HPI)
Pt to ED from home for c/o weakness and vomiting for approx. 2-3 days. En route to hospital pt was in and out of consciousness and became hypotensive. 1000mL bolus given en route. Accucheck 97mg/dL. Pt arrives diaphoretic, A/Ox4. Pt states she \"thinks she may have passed out and fallen today. \" Denies taking blood thinners. Denies hitting head.    Hx Liver problems, diabetes

## 2023-08-28 NOTE — ED QUICK NOTES
Pt unable to tolerate CT imaging d/t vomiting. Pt brought back to ED room. MD made aware; medications given. Will attempt CT scan again if tolerating medications.

## 2023-08-29 ENCOUNTER — HOSPITAL ENCOUNTER (EMERGENCY)
Facility: HOSPITAL | Age: 48
Discharge: HOME | End: 2023-08-29
Attending: EMERGENCY MEDICINE
Payer: MEDICAID

## 2023-08-29 ENCOUNTER — NURSE TRIAGE (OUTPATIENT)
Dept: FAMILY MEDICINE CLINIC | Facility: CLINIC | Age: 48
End: 2023-08-29

## 2023-08-29 VITALS
BODY MASS INDEX: 36 KG/M2 | OXYGEN SATURATION: 95 % | WEIGHT: 207 LBS | RESPIRATION RATE: 19 BRPM | TEMPERATURE: 98 F | DIASTOLIC BLOOD PRESSURE: 64 MMHG | SYSTOLIC BLOOD PRESSURE: 129 MMHG | HEART RATE: 84 BPM

## 2023-08-29 DIAGNOSIS — T50.905A MEDICATION REACTION, INITIAL ENCOUNTER: ICD-10-CM

## 2023-08-29 DIAGNOSIS — R11.2 NAUSEA AND VOMITING IN ADULT: Primary | ICD-10-CM

## 2023-08-29 LAB
ALBUMIN SERPL-MCNC: 3.9 G/DL (ref 3.4–5)
ALBUMIN/GLOB SERPL: 0.9 {RATIO} (ref 1–2)
ALP LIVER SERPL-CCNC: 176 U/L
ALT SERPL-CCNC: 36 U/L
ANION GAP SERPL CALC-SCNC: 10 MMOL/L (ref 0–18)
AST SERPL-CCNC: 15 U/L (ref 15–37)
BASOPHILS # BLD AUTO: 0.04 X10(3) UL (ref 0–0.2)
BASOPHILS NFR BLD AUTO: 0.4 %
BILIRUB SERPL-MCNC: 0.6 MG/DL (ref 0.1–2)
BUN BLD-MCNC: 6 MG/DL (ref 7–18)
BUN/CREAT SERPL: 5.8 (ref 10–20)
CALCIUM BLD-MCNC: 9.8 MG/DL (ref 8.5–10.1)
CHLORIDE SERPL-SCNC: 107 MMOL/L (ref 98–112)
CO2 SERPL-SCNC: 23 MMOL/L (ref 21–32)
CREAT BLD-MCNC: 1.03 MG/DL
DEPRECATED RDW RBC AUTO: 41.7 FL (ref 35.1–46.3)
EGFRCR SERPLBLD CKD-EPI 2021: 67 ML/MIN/1.73M2 (ref 60–?)
EOSINOPHIL # BLD AUTO: 0.04 X10(3) UL (ref 0–0.7)
EOSINOPHIL NFR BLD AUTO: 0.4 %
ERYTHROCYTE [DISTWIDTH] IN BLOOD BY AUTOMATED COUNT: 13.6 % (ref 11–15)
GLOBULIN PLAS-MCNC: 4.4 G/DL (ref 2.8–4.4)
GLUCOSE BLD-MCNC: 148 MG/DL (ref 70–99)
HCT VFR BLD AUTO: 41.1 %
HGB BLD-MCNC: 14.1 G/DL
IMM GRANULOCYTES # BLD AUTO: 0.03 X10(3) UL (ref 0–1)
IMM GRANULOCYTES NFR BLD: 0.3 %
LYMPHOCYTES # BLD AUTO: 1.71 X10(3) UL (ref 1–4)
LYMPHOCYTES NFR BLD AUTO: 15.2 %
MCH RBC QN AUTO: 29.1 PG (ref 26–34)
MCHC RBC AUTO-ENTMCNC: 34.3 G/DL (ref 31–37)
MCV RBC AUTO: 84.7 FL
MONOCYTES # BLD AUTO: 0.68 X10(3) UL (ref 0.1–1)
MONOCYTES NFR BLD AUTO: 6 %
NEUTROPHILS # BLD AUTO: 8.77 X10 (3) UL (ref 1.5–7.7)
NEUTROPHILS # BLD AUTO: 8.77 X10(3) UL (ref 1.5–7.7)
NEUTROPHILS NFR BLD AUTO: 77.7 %
OSMOLALITY SERPL CALC.SUM OF ELEC: 290 MOSM/KG (ref 275–295)
PLATELET # BLD AUTO: 408 10(3)UL (ref 150–450)
POTASSIUM SERPL-SCNC: 3 MMOL/L (ref 3.5–5.1)
PROT SERPL-MCNC: 8.3 G/DL (ref 6.4–8.2)
RBC # BLD AUTO: 4.85 X10(6)UL
SODIUM SERPL-SCNC: 140 MMOL/L (ref 136–145)
WBC # BLD AUTO: 11.3 X10(3) UL (ref 4–11)

## 2023-08-29 PROCEDURE — 96375 TX/PRO/DX INJ NEW DRUG ADDON: CPT

## 2023-08-29 PROCEDURE — 96374 THER/PROPH/DIAG INJ IV PUSH: CPT

## 2023-08-29 PROCEDURE — 99285 EMERGENCY DEPT VISIT HI MDM: CPT

## 2023-08-29 PROCEDURE — 85025 COMPLETE CBC W/AUTO DIFF WBC: CPT | Performed by: EMERGENCY MEDICINE

## 2023-08-29 PROCEDURE — 99284 EMERGENCY DEPT VISIT MOD MDM: CPT

## 2023-08-29 PROCEDURE — 80053 COMPREHEN METABOLIC PANEL: CPT | Performed by: EMERGENCY MEDICINE

## 2023-08-29 RX ORDER — LORAZEPAM 2 MG/ML
0.5 INJECTION INTRAMUSCULAR ONCE
Status: COMPLETED | OUTPATIENT
Start: 2023-08-29 | End: 2023-08-29

## 2023-08-29 RX ORDER — LORAZEPAM 1 MG/1
1 TABLET ORAL 2 TIMES DAILY PRN
Qty: 12 TABLET | Refills: 0 | Status: SHIPPED | OUTPATIENT
Start: 2023-08-29 | End: 2023-09-01

## 2023-08-29 RX ORDER — ONDANSETRON 4 MG/1
4 TABLET, ORALLY DISINTEGRATING ORAL EVERY 4 HOURS PRN
Qty: 15 TABLET | Refills: 0 | Status: SHIPPED | OUTPATIENT
Start: 2023-08-29 | End: 2023-09-01

## 2023-08-29 RX ORDER — ONDANSETRON 2 MG/ML
4 INJECTION INTRAMUSCULAR; INTRAVENOUS ONCE
Status: COMPLETED | OUTPATIENT
Start: 2023-08-29 | End: 2023-08-29

## 2023-08-29 NOTE — ED INITIAL ASSESSMENT (HPI)
Patient arrived to ED from home via EMS for Vomiting and SSRI withdrawal. patient reports vomiting the past 3 days. Unable to keep anything down, including SSRI medication. Patient has been off medication for approximately 3 days now and is experiencing symptoms of SSRI withdrawal including increased anxiety and hyperventilation. Patient states she was in our ED yesterday and received anti-nausea medication which did help. EMS administered 2mg versed en route and obtained BG of 164    Patient is A/O x 4    Patient changed into gown. Side rails up x2, call light within reach, blanket provided.

## 2023-08-31 ENCOUNTER — TELEMEDICINE (OUTPATIENT)
Dept: INTERNAL MEDICINE CLINIC | Facility: CLINIC | Age: 48
End: 2023-08-31

## 2023-08-31 ENCOUNTER — TELEPHONE (OUTPATIENT)
Dept: FAMILY MEDICINE CLINIC | Facility: CLINIC | Age: 48
End: 2023-08-31

## 2023-08-31 DIAGNOSIS — R20.2 PARESTHESIA OF ARM: ICD-10-CM

## 2023-08-31 DIAGNOSIS — Z02.89 ENCOUNTER FOR COMPLETION OF FORM WITH PATIENT: ICD-10-CM

## 2023-08-31 DIAGNOSIS — F41.9 ANXIETY: ICD-10-CM

## 2023-08-31 DIAGNOSIS — R11.2 NAUSEA AND VOMITING, UNSPECIFIED VOMITING TYPE: Primary | ICD-10-CM

## 2023-08-31 PROCEDURE — 99203 OFFICE O/P NEW LOW 30 MIN: CPT | Performed by: INTERNAL MEDICINE

## 2023-09-01 ENCOUNTER — OFFICE VISIT (OUTPATIENT)
Dept: FAMILY MEDICINE CLINIC | Facility: CLINIC | Age: 48
End: 2023-09-01

## 2023-09-01 VITALS
BODY MASS INDEX: 35.17 KG/M2 | WEIGHT: 201 LBS | DIASTOLIC BLOOD PRESSURE: 79 MMHG | HEIGHT: 63.5 IN | HEART RATE: 91 BPM | SYSTOLIC BLOOD PRESSURE: 126 MMHG

## 2023-09-01 DIAGNOSIS — Z12.31 SCREENING MAMMOGRAM, ENCOUNTER FOR: Primary | ICD-10-CM

## 2023-09-01 RX ORDER — CELECOXIB 200 MG/1
200 CAPSULE ORAL DAILY
Qty: 30 CAPSULE | Refills: 1 | Status: SHIPPED | OUTPATIENT
Start: 2023-09-01

## 2023-09-12 DIAGNOSIS — F32.A ANXIETY AND DEPRESSION: ICD-10-CM

## 2023-09-12 DIAGNOSIS — F41.9 ANXIETY AND DEPRESSION: ICD-10-CM

## 2023-09-13 RX ORDER — PAROXETINE HYDROCHLORIDE 40 MG/1
40 TABLET, FILM COATED ORAL EVERY MORNING
Qty: 90 TABLET | Refills: 3 | Status: SHIPPED | OUTPATIENT
Start: 2023-09-13

## 2023-10-11 ENCOUNTER — OFFICE VISIT (OUTPATIENT)
Dept: ENDOCRINOLOGY CLINIC | Facility: CLINIC | Age: 48
End: 2023-10-11

## 2023-10-11 VITALS
HEART RATE: 94 BPM | HEIGHT: 63.5 IN | SYSTOLIC BLOOD PRESSURE: 123 MMHG | BODY MASS INDEX: 37.1 KG/M2 | DIASTOLIC BLOOD PRESSURE: 86 MMHG | WEIGHT: 212 LBS

## 2023-10-11 DIAGNOSIS — D35.2 PITUITARY MICROADENOMA (HCC): ICD-10-CM

## 2023-10-11 DIAGNOSIS — E03.9 HYPOTHYROIDISM, UNSPECIFIED TYPE: ICD-10-CM

## 2023-10-11 DIAGNOSIS — E78.5 DYSLIPIDEMIA: ICD-10-CM

## 2023-10-11 DIAGNOSIS — I10 HYPERTENSION, UNSPECIFIED TYPE: ICD-10-CM

## 2023-10-11 DIAGNOSIS — E11.65 TYPE 2 DIABETES MELLITUS WITH HYPERGLYCEMIA, WITHOUT LONG-TERM CURRENT USE OF INSULIN (HCC): Primary | ICD-10-CM

## 2023-10-11 LAB
CARTRIDGE LOT#: ABNORMAL NUMERIC
GLUCOSE BLOOD: 157
HEMOGLOBIN A1C: 5.8 % (ref 4.3–5.6)
TEST STRIP LOT #: NORMAL NUMERIC

## 2023-10-11 PROCEDURE — 3044F HG A1C LEVEL LT 7.0%: CPT | Performed by: INTERNAL MEDICINE

## 2023-10-11 PROCEDURE — 3008F BODY MASS INDEX DOCD: CPT | Performed by: INTERNAL MEDICINE

## 2023-10-11 PROCEDURE — 99214 OFFICE O/P EST MOD 30 MIN: CPT | Performed by: INTERNAL MEDICINE

## 2023-10-11 PROCEDURE — 83036 HEMOGLOBIN GLYCOSYLATED A1C: CPT | Performed by: INTERNAL MEDICINE

## 2023-10-11 PROCEDURE — 3074F SYST BP LT 130 MM HG: CPT | Performed by: INTERNAL MEDICINE

## 2023-10-11 PROCEDURE — 3079F DIAST BP 80-89 MM HG: CPT | Performed by: INTERNAL MEDICINE

## 2023-10-11 PROCEDURE — 82947 ASSAY GLUCOSE BLOOD QUANT: CPT | Performed by: INTERNAL MEDICINE

## 2023-10-11 RX ORDER — DULAGLUTIDE 3 MG/.5ML
3 INJECTION, SOLUTION SUBCUTANEOUS WEEKLY
Qty: 6 ML | Refills: 0 | Status: SHIPPED | OUTPATIENT
Start: 2023-10-11 | End: 2024-01-09

## 2023-10-11 NOTE — PROGRESS NOTES
Name: Alvino Dasilva  Date: 10/11/23    Referring Physician: No ref. provider found    HISTORY OF PRESENT ILLNESS   Alvino Dasilva is a 51year old female who presents for evaluation and management of type 2 diabetes. She was diagnosed with diabetes prior to her seeing me, and I had started her on Mounjaro initially. She was doing well on this. However, her insurance stopped covering this and I had to switch her to Trulicity. She also has a history of a pituitary microadenoma that was diagnosed in 2015 when she was having irregular menstrual cycles and elevated prolactin was discovered. She has had a repeat pituitary MRI, which shows that the microadenoma has decreased in size. She has an IUD now and does think that she is close to menopausal.     She is concerned that she has started to gain weight since switching to Trulicity.      Blood Glucose Today: 157  HbA1C or glycohemoglobin is 5.8 today (and it was 5.8 on 6/22/23 and it was 6.7 on 11/01/22)  Type 1 or Type 2?: Type 2  Medications for DM: Trulicity 1.2GW qweekly  Checking a few times per month  Fasting- below 100  2 hours after eating- 2 hours after eating- 120s  Episodes of hypoglycemia: none    Dietary compliance: she is eating healthy    Exercise: she is trying to exercise    Polyuria/polydipsia:     Blurred vision: no    Flu Vaccine This Season: yes    Covid Vaccine: yes    REVIEW OF SYSTEMS  CV: Cardiovascular disease present: none         Hypertension present: yes, at goal, on meds         Hyperlipidemia present: not at goal, on meds (6/22/23)         Peripheral Vascular Disease present: none    : Nephropathy present: MAC: none (6/22/23) Creatinine: 1.05     Neuro: Neuropathy present: none    Eyes: Diabetic retinopathy present: no            Most recent visit to eye doctor in last 12 months: 2/28/23    Skin: Infection or ulceration: none    Osteoporosis/ Osteopenia: none Vitamin D: 27.3 (6/22/23)    Thyroid disease: yes  TSH: 8.040 (6/22/23); LT4 88mcg on Saturday and Sunday and 75mcg Monday-Friday- after these labs, her PCP asked her to just take 88mcg daily. Medications:     Current Outpatient Medications:     Dulaglutide (TRULICITY) 3 YN/5.4QV Subcutaneous Solution Pen-injector, Inject 3 mg into the skin once a week., Disp: 6 mL, Rfl: 0    PARoxetine HCl 40 MG Oral Tab, Take 1 tablet (40 mg total) by mouth every morning., Disp: 90 tablet, Rfl: 3    celecoxib (CELEBREX) 200 MG Oral Cap, Take 1 capsule (200 mg total) by mouth daily. , Disp: 30 capsule, Rfl: 1    Phentermine HCl 37.5 MG Oral Tab, Take 1 tablet (37.5 mg total) by mouth before breakfast., Disp: 30 tablet, Rfl: 0    Dulaglutide (TRULICITY) 1.5 XN/8.3DY Subcutaneous Solution Pen-injector, Inject 1.5 mg into the skin once a week., Disp: 6 mL, Rfl: 0    cholecalciferol 25 MCG (1000 UT) Oral Tab, Take 1 tablet (1,000 Units total) by mouth daily. , Disp: , Rfl:     albuterol (PROVENTIL HFA) 108 (90 Base) MCG/ACT Inhalation Aero Soln, Inhale 2 puffs into the lungs every 6 (six) hours as needed for Wheezing., Disp: 1 each, Rfl: 1    atorvastatin 10 MG Oral Tab, Take 1 tablet (10 mg total) by mouth nightly., Disp: 90 tablet, Rfl: 3    Glucose Blood (CONTOUR NEXT TEST) In Vitro Strip, Check blood sugar once daily, Disp: 100 strip, Rfl: 3    Microlet Lancets Does not apply Misc, Use to test blood sugar once daily. , Disp: 100 each, Rfl: 3    MIRENA, 52 MG, 20 MCG/24HR Intrauterine IUD, , Disp: , Rfl: 0     Allergies:     Amoxicillin             HIVES  Potassium               HIVES  Vancomycin              ITCHING    Social History:   Social History     Socioeconomic History    Marital status: Single   Tobacco Use    Smoking status: Never    Smokeless tobacco: Never   Vaping Use    Vaping Use: Never used   Substance and Sexual Activity    Alcohol use: Yes     Comment: socially     Drug use: No    Sexual activity: Yes     Birth control/protection: I.U.D.    Other Topics Concern    Caffeine Concern Yes     Comment: soda, tea, 8 oz daily    Pt has a pacemaker No    Pt has a defibrillator No    Reaction to local anesthetic No       Medical History:   Past Medical History:   Diagnosis Date    Anxiety 2013    Zoloft    Asthma     Benign tumor of pituitary gland ([address]     Bulimia nervosa in remission     Chest pain 2011    hospitalize    Depression     mes--stopped on own    GERD (gastroesophageal reflux disease)     Hypercholesteremia     Hypercholesteremia     IBS (irritable bowel syndrome)     Insomnia 2013    Zoloft    MRSA (methicillin resistant Staphylococcus aureus) 2012    Obesity (BMI 30-39.9)     MATILDE (obstructive sleep apnea)     PMDD (premenstrual dysphoric disorder) 2013    Zoloft    Pre-diabetes     Thyroid disease     Vertigo        Surgical history:   Past Surgical History:   Procedure Laterality Date    COLONOSCOPY  2017    [facility]    EGD  06/29/2022    TUBAL LIGATION      UPPER GI ENDOSCOPY,EXAM  2011    EGD       PHYSICAL EXAM   10/11/23  1802   BP: 123/86   Pulse: 94   Weight: 212 lb (96.2 kg)   Height: 5' 3.5\" (1.613 m)     General Appearance:  alert, well developed, in no acute distress  Eyes:  normal conjunctivae, sclera. , normal sclera and normal pupils  Neuro:  sensory grossly intact and motor grossly intact, normal monofilament exam  Psychiatric:  oriented to time, self, and place  Nutritional:  no abnormal weight gain or loss    Lab Data:   Lab Results   Component Value Date     06/22/2023    A1C 5.8 (A) 10/11/2023     Lab Results   Component Value Date     (H) 08/29/2023    BUN 6 (L) 08/29/2023    BUNCREA 5.8 (L) 08/29/2023    CREATSERUM 1.03 (H) 08/29/2023    ANIONGAP 10 08/29/2023    GFRNAA 77 06/20/2022    GFRAA 89 06/20/2022    CA 9.8 08/29/2023    OSMOCALC 290 08/29/2023    ALKPHO 176 (H) 08/29/2023    AST 15 08/29/2023    ALT 36 08/29/2023    ALKPHOS 72 03/11/2014    BILT 0.6 08/29/2023    TP 8.3 (H) 08/29/2023    ALB 3.9 08/29/2023    GLOBULIN 4.4 08/29/2023 AGRATIO 1.2 03/11/2014     08/29/2023    K 3.0 (L) 08/29/2023     08/29/2023    CO2 23.0 08/29/2023     Lab Results   Component Value Date    CHOLEST 180 06/22/2023    TRIG 170 (H) 06/22/2023    HDL 39 (L) 06/22/2023     (H) 06/22/2023    VLDL 29 06/22/2023    NONHDLC 141 (H) 06/22/2023    CALCNONHDL 183 (H) 08/26/2013     Lab Results   Component Value Date    MALBP 2.65 06/22/2023    CREUR 273.00 06/22/2023       ASSESSMENT/PLAN:  This is a 49year-old woman here for evaluation and management of type 2 diabetes. We discussed the ABCs of DM.     1.) Hyperglycemia Management- We discussed the importance of glycemic control to prevent complications of diabetes. We discussed the importance of SBGM. I offered and provided patient education materials and offered a blood glucose log book. - Increase Trulicity from 1.5 to 6.8OA qweekly  - If this does not help with weight loss, then we will switch back to [facility]     2.) Management of Diabetic Complications- We discussed the complications of diabetes include retinopathy, neuropathy, nephropathy and cardiovascular disease. - Ophtho- up to date  - Flu and Covid vaccine- up to date  - BP- at goal, on meds  - Lipids- check in 3 months  - MAC- check in 3 months  - CMP- check in 3 months  - Neuropathy- none  - CAD- none    3.) Lifestyle Management for Diabetes- We discussed importance of a low CHO diet, and recommend 45gm per meal or 135gm per day. We discussed the importance of trying to follow a Mediterranean diet, with an emphasis on vegetables at every meal, with lots whole grains, and protein from either plant-based sources, or poultry and fish. - Diet- I have given her the 'the diabetes plate'  - Exercise- she will try to increase this     4.) Hypothyroidism- I have confirmed that she is taking the medication in the correct manner and she is not missing doses.  I would like to change the levothyroxine to Unithroid and keep the same dose and check again in 3 months.     5.) History of Pituitary Microadenoma with mildly elevated prolactin  - Repeat MRI shows that that pituitary adenoma has decreased in size  - I would like to repeat all pituitary labs in 3 months    Return to clinic in 3 months    Prior to this encounter, I spent over 15 minutes with preparing for the visit, including reviewing documents from other specialties as well as from PCP and going over test results. During the face to face encounter, I spent an additional 15 minutes which were determined for follow-up. Greater than 50% of the time was spent in counseling, anticipatory guidance, and coordination of care. Patient concerns were answered to the best of my knowledge. 10/11/23  Hanna Person MD

## 2023-10-19 DIAGNOSIS — E78.00 HYPERCHOLESTEREMIA: ICD-10-CM

## 2023-10-19 DIAGNOSIS — E66.9 OBESITY (BMI 30-39.9): ICD-10-CM

## 2023-10-19 RX ORDER — PHENTERMINE HYDROCHLORIDE 37.5 MG/1
37.5 TABLET ORAL
Qty: 30 TABLET | Refills: 0 | OUTPATIENT
Start: 2023-10-19

## 2023-10-19 RX ORDER — ATORVASTATIN CALCIUM 10 MG/1
10 TABLET, FILM COATED ORAL NIGHTLY
Qty: 90 TABLET | Refills: 3 | OUTPATIENT
Start: 2023-10-19

## 2023-10-19 NOTE — TELEPHONE ENCOUNTER
LOV: 10/11/23    RTC:  3 Months    FU: No FU Appt Scheduled    Last Refill: 7/6/23    Last Labs: 08/2023     3 Month Supply Pending

## 2023-10-19 NOTE — TELEPHONE ENCOUNTER
Duplicate request, previously addressed. Disp Refills Start End    atorvastatin 10 MG Oral Tab 90 tablet 3 6/27/2023     Sig - Route: Take 1 tablet (10 mg total) by mouth nightly. - Oral    Sent to pharmacy as:  Atorvastatin Calcium 10 MG Oral Tablet (Lipitor)    E-Prescribing Status: Receipt confirmed by pharmacy (6/27/2023  8:33 AM CDT)      Associated Diagnoses    [facility] [id number], [place] - [address] [place] [address] [facility], [phone], [phone]

## 2023-10-19 NOTE — TELEPHONE ENCOUNTER
Not on Med List:    Unithroid 0.88MG ROXIE [facility]) Tablets  SIG: Take 1 Tablet (88MCG) By Mouth Before Breakfast  QTY: 90  LAST REFILL: 7/18/2023

## 2023-10-19 NOTE — TELEPHONE ENCOUNTER
Dr. Jean,     Please advise regarding rx pending for approval    LOV: 10/11/23  RTC: 3 months

## 2023-10-20 RX ORDER — LEVOTHYROXINE SODIUM 88 UG/1
88 TABLET ORAL
Qty: 90 TABLET | Refills: 0 | OUTPATIENT
Start: 2023-10-20

## 2023-10-20 RX ORDER — LEVOTHYROXINE SODIUM 88 UG/1
88 TABLET ORAL
Qty: 90 TABLET | Refills: 0 | Status: SHIPPED | OUTPATIENT
Start: 2023-10-20

## 2023-12-12 ENCOUNTER — LAB ENCOUNTER (OUTPATIENT)
Dept: LAB | Age: 48
End: 2023-12-12
Attending: INTERNAL MEDICINE
Payer: COMMERCIAL

## 2023-12-12 DIAGNOSIS — E78.5 DYSLIPIDEMIA: ICD-10-CM

## 2023-12-12 DIAGNOSIS — D35.2 PITUITARY MICROADENOMA (HCC): ICD-10-CM

## 2023-12-12 DIAGNOSIS — E66.9 OBESITY (BMI 30-39.9): ICD-10-CM

## 2023-12-12 DIAGNOSIS — E11.65 TYPE 2 DIABETES MELLITUS WITH HYPERGLYCEMIA, WITHOUT LONG-TERM CURRENT USE OF INSULIN (HCC): ICD-10-CM

## 2023-12-12 DIAGNOSIS — D75.839 THROMBOCYTOSIS: ICD-10-CM

## 2023-12-12 DIAGNOSIS — E03.9 HYPOTHYROIDISM, UNSPECIFIED TYPE: ICD-10-CM

## 2023-12-12 LAB
ALBUMIN SERPL-MCNC: 4.6 G/DL (ref 3.2–4.8)
ALBUMIN/GLOB SERPL: 1.4 {RATIO} (ref 1–2)
ALP LIVER SERPL-CCNC: 227 U/L
ALT SERPL-CCNC: 89 U/L
ANION GAP SERPL CALC-SCNC: 7 MMOL/L (ref 0–18)
AST SERPL-CCNC: 34 U/L (ref ?–34)
BASOPHILS # BLD AUTO: 0.05 X10(3) UL (ref 0–0.2)
BASOPHILS NFR BLD AUTO: 0.7 %
BILIRUB SERPL-MCNC: 0.5 MG/DL (ref 0.3–1.2)
BUN BLD-MCNC: 16 MG/DL (ref 9–23)
BUN/CREAT SERPL: 17.4 (ref 10–20)
CALCIUM BLD-MCNC: 10.1 MG/DL (ref 8.7–10.4)
CHLORIDE SERPL-SCNC: 103 MMOL/L (ref 98–112)
CHOLEST SERPL-MCNC: 182 MG/DL (ref ?–200)
CO2 SERPL-SCNC: 29 MMOL/L (ref 21–32)
CORTIS SERPL-MCNC: 26.3 UG/DL
CREAT BLD-MCNC: 0.92 MG/DL
CREAT UR-SCNC: 178 MG/DL
DEPRECATED RDW RBC AUTO: 39.3 FL (ref 35.1–46.3)
EGFRCR SERPLBLD CKD-EPI 2021: 76 ML/MIN/1.73M2 (ref 60–?)
EOSINOPHIL # BLD AUTO: 0.14 X10(3) UL (ref 0–0.7)
EOSINOPHIL NFR BLD AUTO: 1.8 %
ERYTHROCYTE [DISTWIDTH] IN BLOOD BY AUTOMATED COUNT: 13 % (ref 11–15)
FASTING PATIENT LIPID ANSWER: YES
FASTING STATUS PATIENT QL REPORTED: YES
GLOBULIN PLAS-MCNC: 3.2 G/DL (ref 2.8–4.4)
GLUCOSE BLD-MCNC: 164 MG/DL (ref 70–99)
HCT VFR BLD AUTO: 41.6 %
HDLC SERPL-MCNC: 56 MG/DL (ref 40–59)
HGB BLD-MCNC: 14.3 G/DL
IMM GRANULOCYTES # BLD AUTO: 0.01 X10(3) UL (ref 0–1)
IMM GRANULOCYTES NFR BLD: 0.1 %
LDLC SERPL CALC-MCNC: 105 MG/DL (ref ?–100)
LYMPHOCYTES # BLD AUTO: 2.26 X10(3) UL (ref 1–4)
LYMPHOCYTES NFR BLD AUTO: 29.4 %
MCH RBC QN AUTO: 28.8 PG (ref 26–34)
MCHC RBC AUTO-ENTMCNC: 34.4 G/DL (ref 31–37)
MCV RBC AUTO: 83.9 FL
MICROALBUMIN UR-MCNC: 1.2 MG/DL
MICROALBUMIN/CREAT 24H UR-RTO: 6.7 UG/MG (ref ?–30)
MONOCYTES # BLD AUTO: 0.63 X10(3) UL (ref 0.1–1)
MONOCYTES NFR BLD AUTO: 8.2 %
NEUTROPHILS # BLD AUTO: 4.6 X10 (3) UL (ref 1.5–7.7)
NEUTROPHILS # BLD AUTO: 4.6 X10(3) UL (ref 1.5–7.7)
NEUTROPHILS NFR BLD AUTO: 59.8 %
NONHDLC SERPL-MCNC: 126 MG/DL (ref ?–130)
OSMOLALITY SERPL CALC.SUM OF ELEC: 293 MOSM/KG (ref 275–295)
PLATELET # BLD AUTO: 406 10(3)UL (ref 150–450)
POTASSIUM SERPL-SCNC: 4.2 MMOL/L (ref 3.5–5.1)
PROLACTIN SERPL-MCNC: 19.1 NG/ML
PROT SERPL-MCNC: 7.8 G/DL (ref 5.7–8.2)
RBC # BLD AUTO: 4.96 X10(6)UL
SODIUM SERPL-SCNC: 139 MMOL/L (ref 136–145)
T4 FREE SERPL-MCNC: 0.9 NG/DL (ref 0.8–1.7)
TRIGL SERPL-MCNC: 117 MG/DL (ref 30–149)
TSI SER-ACNC: 2.54 MIU/ML (ref 0.55–4.78)
VLDLC SERPL CALC-MCNC: 20 MG/DL (ref 0–30)
WBC # BLD AUTO: 7.7 X10(3) UL (ref 4–11)

## 2023-12-12 PROCEDURE — 84146 ASSAY OF PROLACTIN: CPT

## 2023-12-12 PROCEDURE — 36415 COLL VENOUS BLD VENIPUNCTURE: CPT

## 2023-12-12 PROCEDURE — 82043 UR ALBUMIN QUANTITATIVE: CPT

## 2023-12-12 PROCEDURE — 84443 ASSAY THYROID STIM HORMONE: CPT

## 2023-12-12 PROCEDURE — 84439 ASSAY OF FREE THYROXINE: CPT

## 2023-12-12 PROCEDURE — 82533 TOTAL CORTISOL: CPT

## 2023-12-12 PROCEDURE — 85025 COMPLETE CBC W/AUTO DIFF WBC: CPT

## 2023-12-12 PROCEDURE — 80053 COMPREHEN METABOLIC PANEL: CPT

## 2023-12-12 PROCEDURE — 80061 LIPID PANEL: CPT

## 2023-12-12 PROCEDURE — 82570 ASSAY OF URINE CREATININE: CPT

## 2023-12-12 PROCEDURE — 84305 ASSAY OF SOMATOMEDIN: CPT

## 2023-12-12 PROCEDURE — 82024 ASSAY OF ACTH: CPT

## 2023-12-12 RX ORDER — PHENTERMINE HYDROCHLORIDE 37.5 MG/1
37.5 TABLET ORAL
Qty: 30 TABLET | Refills: 0 | OUTPATIENT
Start: 2023-12-12

## 2023-12-13 LAB
ACTH: 41.3 PG/ML
IGF I: 143 NG/ML
IGF-1, Z SCORE: 0.2 S.D.

## 2024-03-06 ENCOUNTER — OFFICE VISIT (OUTPATIENT)
Dept: ENDOCRINOLOGY CLINIC | Facility: CLINIC | Age: 49
End: 2024-03-06
Payer: COMMERCIAL

## 2024-03-06 VITALS
DIASTOLIC BLOOD PRESSURE: 68 MMHG | HEART RATE: 78 BPM | WEIGHT: 222 LBS | BODY MASS INDEX: 38.85 KG/M2 | HEIGHT: 63.5 IN | SYSTOLIC BLOOD PRESSURE: 108 MMHG

## 2024-03-06 DIAGNOSIS — E11.65 TYPE 2 DIABETES MELLITUS WITH HYPERGLYCEMIA, WITHOUT LONG-TERM CURRENT USE OF INSULIN (HCC): Primary | ICD-10-CM

## 2024-03-06 DIAGNOSIS — E78.5 DYSLIPIDEMIA: ICD-10-CM

## 2024-03-06 DIAGNOSIS — E55.9 VITAMIN D DEFICIENCY: ICD-10-CM

## 2024-03-06 DIAGNOSIS — D35.2 PITUITARY MICROADENOMA (HCC): ICD-10-CM

## 2024-03-06 DIAGNOSIS — E66.01 CLASS 2 SEVERE OBESITY DUE TO EXCESS CALORIES WITH SERIOUS COMORBIDITY AND BODY MASS INDEX (BMI) OF 38.0 TO 38.9 IN ADULT (HCC): ICD-10-CM

## 2024-03-06 DIAGNOSIS — E03.9 HYPOTHYROIDISM, UNSPECIFIED TYPE: ICD-10-CM

## 2024-03-06 PROBLEM — E66.812 CLASS 2 SEVERE OBESITY DUE TO EXCESS CALORIES WITH SERIOUS COMORBIDITY AND BODY MASS INDEX (BMI) OF 38.0 TO 38.9 IN ADULT (HCC): Status: ACTIVE | Noted: 2022-07-13

## 2024-03-06 LAB
CARTRIDGE LOT#: ABNORMAL NUMERIC
GLUCOSE BLOOD: 77
HEMOGLOBIN A1C: 6.2 % (ref 4.3–5.6)
TEST STRIP EXPIRATION DATE: NORMAL DATE
TEST STRIP LOT #: NORMAL NUMERIC

## 2024-03-06 RX ORDER — TIRZEPATIDE 7.5 MG/.5ML
7.5 INJECTION, SOLUTION SUBCUTANEOUS WEEKLY
COMMUNITY
Start: 2024-02-26

## 2024-03-06 RX ORDER — TIRZEPATIDE 7.5 MG/.5ML
7.5 INJECTION, SOLUTION SUBCUTANEOUS WEEKLY
Qty: 2 ML | Refills: 2 | Status: SHIPPED | OUTPATIENT
Start: 2024-03-06 | End: 2024-04-05

## 2024-03-06 RX ORDER — PHENTERMINE HYDROCHLORIDE 37.5 MG/1
18.75 TABLET ORAL
Qty: 15 TABLET | Refills: 0 | Status: SHIPPED | OUTPATIENT
Start: 2024-03-06 | End: 2024-04-05

## 2024-03-06 RX ORDER — LEVOTHYROXINE SODIUM 100 UG/1
100 TABLET ORAL
Qty: 90 TABLET | Refills: 0 | Status: SHIPPED | OUTPATIENT
Start: 2024-03-06 | End: 2024-06-04

## 2024-03-06 NOTE — PROGRESS NOTES
Name: Evon Liriano  Date: 10/11/23    Referring Physician: No ref. provider found    HISTORY OF PRESENT ILLNESS   Evon Liriano is a 48 year old female who presents for evaluation and management of type 2 diabetes. She was diagnosed with diabetes prior to her seeing me, and I had started her on Mounjaro initially. She was doing well on this. However, her insurance stopped covering this and I had to switch her to Trulicity.     She also has a history of a pituitary microadenoma that was diagnosed in 2015 when she was having irregular menstrual cycles and elevated prolactin was discovered. She has had a repeat pituitary MRI, which shows that the microadenoma has decreased in size. She has an IUD now and does think that she is close to menopausal.     Since the last visit, we have switched her back to Mounjaro 7.5mg and she is doing well on this.       Blood Glucose Today: 77  HbA1C or glycohemoglobin is 6.2 today (and it was 5.8 on 10/11/23 and it was 5.8 on 6/22/23 and it was 6.7 on 11/01/22)  Type 1 or Type 2?: Type 2  Medications for DM: Mounjaro 7.5mg qweekly  Checking a few times per month  Fasting- below 100  2 hours after eating- 2 hours after eating- 120s  Episodes of hypoglycemia: occasionally when she does not eat for many hours, she feels verycold and if she checks blood sugars, they are in the 60s.     Dietary compliance: she is eating healthy    Exercise: she is trying to exercise    Polyuria/polydipsia: none    Blurred vision: none    Flu Vaccine This Season: yes    Covid Vaccine: yes    REVIEW OF SYSTEMS  CV: Cardiovascular disease present: none         Hypertension present: yes, at goal, not on meds         Hyperlipidemia present: not at goal, on meds (12/12/23)         Peripheral Vascular Disease present: none    : Nephropathy present: MAC: none (12/12/23) Creatinine: 0.92     Neuro: Neuropathy present: none    Eyes: Diabetic retinopathy present: no            Most recent visit to eye  doctor in last 12 months: 2/28/23    Skin: Infection or ulceration: none    Osteoporosis/ Osteopenia: severe osteopenia Vitamin D: 27.3 (6/22/23)    Thyroid disease: yes  TSH: 2.537 (12/12/23); LT4 88mcg   Medications:     Current Outpatient Medications:     MOUNJARO 7.5 MG/0.5ML Subcutaneous Solution Pen-injector, Inject 7.5 mg into the skin once a week., Disp: , Rfl:     UNITHROID 88 MCG Oral Tab, TAKE 1 TABLET(88 MCG) BY MOUTH BEFORE BREAKFAST, Disp: 90 tablet, Rfl: 3    PARoxetine HCl 40 MG Oral Tab, Take 1 tablet (40 mg total) by mouth every morning., Disp: 90 tablet, Rfl: 3    celecoxib (CELEBREX) 200 MG Oral Cap, Take 1 capsule (200 mg total) by mouth daily., Disp: 30 capsule, Rfl: 1    Phentermine HCl 37.5 MG Oral Tab, Take 1 tablet (37.5 mg total) by mouth before breakfast., Disp: 30 tablet, Rfl: 0    cholecalciferol 25 MCG (1000 UT) Oral Tab, Take 1 tablet (1,000 Units total) by mouth daily., Disp: , Rfl:     albuterol (PROVENTIL HFA) 108 (90 Base) MCG/ACT Inhalation Aero Soln, Inhale 2 puffs into the lungs every 6 (six) hours as needed for Wheezing., Disp: 1 each, Rfl: 1    atorvastatin 10 MG Oral Tab, Take 1 tablet (10 mg total) by mouth nightly., Disp: 90 tablet, Rfl: 3    Glucose Blood (CONTOUR NEXT TEST) In Vitro Strip, Check blood sugar once daily, Disp: 100 strip, Rfl: 3    Microlet Lancets Does not apply Misc, Use to test blood sugar once daily., Disp: 100 each, Rfl: 3    MIRENA, 52 MG, 20 MCG/24HR Intrauterine IUD, , Disp: , Rfl: 0     Allergies:   Allergies   Allergen Reactions    Amoxicillin HIVES    Potassium HIVES    Vancomycin ITCHING       Social History:   Social History     Socioeconomic History    Marital status: Single   Tobacco Use    Smoking status: Never    Smokeless tobacco: Never   Vaping Use    Vaping Use: Never used   Substance and Sexual Activity    Alcohol use: Yes     Comment: socially     Drug use: No    Sexual activity: Yes     Birth control/protection: I.U.D.   Other Topics  Concern    Caffeine Concern Yes     Comment: soda, tea, 8 oz daily    Pt has a pacemaker No    Pt has a defibrillator No    Reaction to local anesthetic No       Medical History:   Past Medical History:   Diagnosis Date    Anxiety 2013    Zoloft    Asthma (HCC)     Benign tumor of pituitary gland (HCC)     Bulimia nervosa in remission (HCC)     Chest pain 2011    hospitalize    Depression     mes--stopped on own    GERD (gastroesophageal reflux disease)     Hypercholesteremia     Hypercholesteremia     IBS (irritable bowel syndrome)     Insomnia 2013    Zoloft    MRSA (methicillin resistant Staphylococcus aureus) 2012    Obesity (BMI 30-39.9)     MATILDE (obstructive sleep apnea)     PMDD (premenstrual dysphoric disorder) 2013    Zoloft    Pre-diabetes     Thyroid disease     Vertigo        Surgical history:   Past Surgical History:   Procedure Laterality Date    COLONOSCOPY  2017    [facility]    EGD  06/29/2022    TUBAL LIGATION      UPPER GI ENDOSCOPY,EXAM  2011    EGD       PHYSICAL EXAM  Vitals:    03/06/24 1743   BP: 108/68   Pulse: 78   Weight: 222 lb (100.7 kg)   Height: 5' 3.5\" (1.613 m)       General Appearance:  alert, well developed, in no acute distress  Eyes:  normal conjunctivae, sclera., normal sclera and normal pupils  Neuro:  sensory grossly intact and motor grossly intact, normal monofilament exam  Psychiatric:  oriented to time, self, and place  Nutritional:  no abnormal weight gain or loss  Bilateral barefoot skin diabetic exam is normal, visualized feet and the appearance is normal.  Bilateral monofilament/sensation of both feet is normal.  Pulsation pedal pulse exam of both lower legs/feet is normal as well.        Lab Data:   Lab Results   Component Value Date     06/22/2023    A1C 5.8 (A) 10/11/2023     Lab Results   Component Value Date     (H) 12/12/2023    BUN 16 12/12/2023    BUNCREA 17.4 12/12/2023    CREATSERUM 0.92 12/12/2023    ANIONGAP 7 12/12/2023    GFRNAA 77 06/20/2022     GFRAA 89 06/20/2022    CA 10.1 12/12/2023    OSMOCALC 293 12/12/2023    ALKPHO 227 (H) 12/12/2023    AST 34 12/12/2023    ALT 89 (H) 12/12/2023    ALKPHOS 72 03/11/2014    BILT 0.5 12/12/2023    TP 7.8 12/12/2023    ALB 4.6 12/12/2023    GLOBULIN 3.2 12/12/2023    AGRATIO 1.2 03/11/2014     12/12/2023    K 4.2 12/12/2023     12/12/2023    CO2 29.0 12/12/2023     Lab Results   Component Value Date    CHOLEST 182 12/12/2023    TRIG 117 12/12/2023    HDL 56 12/12/2023     (H) 12/12/2023    VLDL 20 12/12/2023    NONHDLC 126 12/12/2023    CALCNONHDL 183 (H) 08/26/2013     Lab Results   Component Value Date    MALBP 1.20 12/12/2023    CREUR 178.00 12/12/2023       ASSESSMENT/PLAN:  This is a 48 year-old woman here for evaluation and management of type 2 diabetes. We discussed the ABCs of DM.     1.) Hyperglycemia Management- We discussed the importance of glycemic control to prevent complications of diabetes. We discussed the importance of SBGM. I offered and provided patient education materials and offered a blood glucose log book.   - Continue Mounjaro 7.5mg qweekly  - Check blood sugars fasting and two hours after biggest meal    2.) Management of Diabetic Complications- We discussed the complications of diabetes include retinopathy, neuropathy, nephropathy and cardiovascular disease.   - Ophtho- up to date  - Flu and Covid vaccine- up to date  - BP- at goal, on meds  - Lipids- check in 3 months, increase atorvastatin from 10mg to 20mg  - MAC- check in 3 months  - CMP- check in 3 months  - Neuropathy- none  - CAD- none    3.) Lifestyle Management for Diabetes- We discussed importance of a low CHO diet, and recommend 45gm per meal or 135gm per day. We discussed the importance of trying to follow a Mediterranean diet, with an emphasis on vegetables at every meal, with lots whole grains, and protein from either plant-based sources, or poultry and fish.   - Diet- I have given her the 'the diabetes  plate', I am asking her to try and eat more complex carbs so that she does not have low blood sugars   - Exercise- she will try to increase this     4.) Hypothyroidism- I would like to increase her Unithroid dose from 88mcg to 100mcg and recheck labs in 3 months    5.) History of Pituitary Microadenoma with mildly elevated prolactin  - Repeat MRI shows that that pituitary adenoma has decreased in size  - I would like to repeat all pituitary labs in 3 months- completed and within normal limits    6.) Osteopenia- check DEXA scan at next visit    7.) Weight loss- start 18.75mg of phentermine.     Return to clinic in 3 months    Prior to this encounter, I spent over 15 minutes with preparing for the visit, including reviewing documents from other specialties as well as from PCP and going over test results. During the face to face encounter, I spent an additional 15 minutes which were determined for follow-up. Greater than 50% of the time was spent in counseling, anticipatory guidance, and coordination of care. Patient concerns were answered to the best of my knowledge.       10/11/23  Hanan Jean MD    Foot exam  Increase unithr  Keep same dose of mounjaro  Start phent  Labs in 3 months

## 2024-04-01 ENCOUNTER — PATIENT MESSAGE (OUTPATIENT)
Dept: ENDOCRINOLOGY CLINIC | Facility: CLINIC | Age: 49
End: 2024-04-01

## 2024-04-01 DIAGNOSIS — E11.65 TYPE 2 DIABETES MELLITUS WITH HYPERGLYCEMIA, WITHOUT LONG-TERM CURRENT USE OF INSULIN (HCC): Primary | ICD-10-CM

## 2024-04-01 DIAGNOSIS — E66.01 CLASS 2 SEVERE OBESITY DUE TO EXCESS CALORIES WITH SERIOUS COMORBIDITY AND BODY MASS INDEX (BMI) OF 38.0 TO 38.9 IN ADULT (HCC): ICD-10-CM

## 2024-04-03 NOTE — TELEPHONE ENCOUNTER
From: Evon Liriano  To: Hanna Jean  Sent: 4/1/2024 3:28 PM CDT  Subject: Diabetes eye check up & Phentermine dosage    Hello Dr. Jean, I thought we spoke about me being due to get my eyes checked but I haven’t seen anything on my chart about it . Also, I have been taking half pill of the phentermine , don’t see much of a change in my hunger . You had mentioned that I let you know and maybe we can move up to the whole pill .     Please advise     Thank you

## 2024-04-10 RX ORDER — PHENTERMINE HYDROCHLORIDE 37.5 MG/1
37.5 TABLET ORAL
Qty: 30 TABLET | Refills: 0 | Status: SHIPPED | OUTPATIENT
Start: 2024-04-10 | End: 2024-05-10

## 2024-04-29 ENCOUNTER — ORDER TRANSCRIPTION (OUTPATIENT)
Dept: SLEEP CENTER | Age: 49
End: 2024-04-29

## 2024-05-08 DIAGNOSIS — E66.01 CLASS 2 SEVERE OBESITY DUE TO EXCESS CALORIES WITH SERIOUS COMORBIDITY AND BODY MASS INDEX (BMI) OF 38.0 TO 38.9 IN ADULT (HCC): ICD-10-CM

## 2024-05-08 DIAGNOSIS — E11.65 TYPE 2 DIABETES MELLITUS WITH HYPERGLYCEMIA, WITHOUT LONG-TERM CURRENT USE OF INSULIN (HCC): ICD-10-CM

## 2024-05-13 RX ORDER — PHENTERMINE HYDROCHLORIDE 37.5 MG/1
37.5 TABLET ORAL
Qty: 90 TABLET | Refills: 0 | Status: SHIPPED | OUTPATIENT
Start: 2024-05-13

## 2024-05-27 ENCOUNTER — TELEPHONE (OUTPATIENT)
Dept: FAMILY MEDICINE CLINIC | Facility: CLINIC | Age: 49
End: 2024-05-27

## 2024-05-27 DIAGNOSIS — K08.89 PAIN, DENTAL: Primary | ICD-10-CM

## 2024-05-27 RX ORDER — CLINDAMYCIN HYDROCHLORIDE 150 MG/1
450 CAPSULE ORAL 3 TIMES DAILY
Qty: 90 CAPSULE | Refills: 0 | Status: SHIPPED | OUTPATIENT
Start: 2024-05-27 | End: 2024-06-06

## 2024-05-27 NOTE — TELEPHONE ENCOUNTER
ON CALL MD  Received page re: dental pain.  Reports progressive dental pain since Thurs 5/23  Left upper crown area affected  Awaiting emergency dentist appt tomorrow  Chart reviewed - amox allergy noted  Will send Clindamycin to [facility]

## 2024-06-04 ENCOUNTER — LAB ENCOUNTER (OUTPATIENT)
Dept: LAB | Age: 49
End: 2024-06-04
Attending: INTERNAL MEDICINE
Payer: COMMERCIAL

## 2024-06-04 DIAGNOSIS — E55.9 VITAMIN D DEFICIENCY: ICD-10-CM

## 2024-06-04 DIAGNOSIS — E11.65 TYPE 2 DIABETES MELLITUS WITH HYPERGLYCEMIA, WITHOUT LONG-TERM CURRENT USE OF INSULIN (HCC): ICD-10-CM

## 2024-06-04 DIAGNOSIS — E78.5 DYSLIPIDEMIA: ICD-10-CM

## 2024-06-04 DIAGNOSIS — E03.9 HYPOTHYROIDISM, UNSPECIFIED TYPE: ICD-10-CM

## 2024-06-04 LAB
ALBUMIN SERPL-MCNC: 4.5 G/DL (ref 3.2–4.8)
ALBUMIN/GLOB SERPL: 1.3 {RATIO} (ref 1–2)
ALP LIVER SERPL-CCNC: 211 U/L
ALT SERPL-CCNC: 73 U/L
ANION GAP SERPL CALC-SCNC: 7 MMOL/L (ref 0–18)
AST SERPL-CCNC: 38 U/L (ref ?–34)
BILIRUB SERPL-MCNC: 0.5 MG/DL (ref 0.3–1.2)
BUN BLD-MCNC: 16 MG/DL (ref 9–23)
BUN/CREAT SERPL: 16 (ref 10–20)
CALCIUM BLD-MCNC: 9.9 MG/DL (ref 8.7–10.4)
CHLORIDE SERPL-SCNC: 105 MMOL/L (ref 98–112)
CHOLEST SERPL-MCNC: 187 MG/DL (ref ?–200)
CO2 SERPL-SCNC: 27 MMOL/L (ref 21–32)
CREAT BLD-MCNC: 1 MG/DL
CREAT UR-SCNC: 245.9 MG/DL
EGFRCR SERPLBLD CKD-EPI 2021: 69 ML/MIN/1.73M2 (ref 60–?)
FASTING PATIENT LIPID ANSWER: YES
FASTING STATUS PATIENT QL REPORTED: YES
GLOBULIN PLAS-MCNC: 3.4 G/DL (ref 2–3.5)
GLUCOSE BLD-MCNC: 90 MG/DL (ref 70–99)
HDLC SERPL-MCNC: 44 MG/DL (ref 40–59)
LDLC SERPL CALC-MCNC: 120 MG/DL (ref ?–100)
MICROALBUMIN UR-MCNC: 2.9 MG/DL
MICROALBUMIN/CREAT 24H UR-RTO: 11.8 UG/MG (ref ?–30)
NONHDLC SERPL-MCNC: 143 MG/DL (ref ?–130)
OSMOLALITY SERPL CALC.SUM OF ELEC: 289 MOSM/KG (ref 275–295)
POTASSIUM SERPL-SCNC: 4.6 MMOL/L (ref 3.5–5.1)
PROT SERPL-MCNC: 7.9 G/DL (ref 5.7–8.2)
SODIUM SERPL-SCNC: 139 MMOL/L (ref 136–145)
T4 FREE SERPL-MCNC: 1 NG/DL (ref 0.8–1.7)
TRIGL SERPL-MCNC: 128 MG/DL (ref 30–149)
TSI SER-ACNC: 0.24 MIU/ML (ref 0.55–4.78)
VIT D+METAB SERPL-MCNC: 26.7 NG/ML (ref 30–100)
VLDLC SERPL CALC-MCNC: 22 MG/DL (ref 0–30)

## 2024-06-04 PROCEDURE — 36415 COLL VENOUS BLD VENIPUNCTURE: CPT

## 2024-06-04 PROCEDURE — 82043 UR ALBUMIN QUANTITATIVE: CPT

## 2024-06-04 PROCEDURE — 80053 COMPREHEN METABOLIC PANEL: CPT

## 2024-06-04 PROCEDURE — 82570 ASSAY OF URINE CREATININE: CPT

## 2024-06-04 PROCEDURE — 82306 VITAMIN D 25 HYDROXY: CPT

## 2024-06-04 PROCEDURE — 84439 ASSAY OF FREE THYROXINE: CPT

## 2024-06-04 PROCEDURE — 84443 ASSAY THYROID STIM HORMONE: CPT

## 2024-06-04 PROCEDURE — 80061 LIPID PANEL: CPT

## 2024-06-05 ENCOUNTER — OFFICE VISIT (OUTPATIENT)
Dept: ENDOCRINOLOGY CLINIC | Facility: CLINIC | Age: 49
End: 2024-06-05
Payer: COMMERCIAL

## 2024-06-05 VITALS
WEIGHT: 213 LBS | HEIGHT: 63.5 IN | DIASTOLIC BLOOD PRESSURE: 62 MMHG | BODY MASS INDEX: 37.27 KG/M2 | SYSTOLIC BLOOD PRESSURE: 100 MMHG | HEART RATE: 85 BPM

## 2024-06-05 DIAGNOSIS — E11.65 TYPE 2 DIABETES MELLITUS WITH HYPERGLYCEMIA, WITHOUT LONG-TERM CURRENT USE OF INSULIN (HCC): Primary | ICD-10-CM

## 2024-06-05 DIAGNOSIS — M85.80 OSTEOPENIA, UNSPECIFIED LOCATION: ICD-10-CM

## 2024-06-05 DIAGNOSIS — E78.5 DYSLIPIDEMIA: ICD-10-CM

## 2024-06-05 DIAGNOSIS — E55.9 VITAMIN D DEFICIENCY: ICD-10-CM

## 2024-06-05 DIAGNOSIS — E66.01 CLASS 2 SEVERE OBESITY DUE TO EXCESS CALORIES WITH SERIOUS COMORBIDITY AND BODY MASS INDEX (BMI) OF 37.0 TO 37.9 IN ADULT (HCC): ICD-10-CM

## 2024-06-05 DIAGNOSIS — E03.9 HYPOTHYROIDISM, UNSPECIFIED TYPE: ICD-10-CM

## 2024-06-05 PROBLEM — E66.812 CLASS 2 SEVERE OBESITY DUE TO EXCESS CALORIES WITH SERIOUS COMORBIDITY AND BODY MASS INDEX (BMI) OF 37.0 TO 37.9 IN ADULT (HCC): Status: ACTIVE | Noted: 2022-07-13

## 2024-06-05 LAB
CARTRIDGE EXPIRATION DATE: NORMAL DATE
GLUCOSE BLOOD: 75
HEMOGLOBIN A1C: 5.5 % (ref 4.3–5.6)
TEST STRIP LOT #: NORMAL NUMERIC

## 2024-06-05 PROCEDURE — 99214 OFFICE O/P EST MOD 30 MIN: CPT | Performed by: INTERNAL MEDICINE

## 2024-06-05 PROCEDURE — 3044F HG A1C LEVEL LT 7.0%: CPT | Performed by: INTERNAL MEDICINE

## 2024-06-05 PROCEDURE — 83036 HEMOGLOBIN GLYCOSYLATED A1C: CPT | Performed by: INTERNAL MEDICINE

## 2024-06-05 PROCEDURE — 3061F NEG MICROALBUMINURIA REV: CPT | Performed by: INTERNAL MEDICINE

## 2024-06-05 PROCEDURE — 3074F SYST BP LT 130 MM HG: CPT | Performed by: INTERNAL MEDICINE

## 2024-06-05 PROCEDURE — 82947 ASSAY GLUCOSE BLOOD QUANT: CPT | Performed by: INTERNAL MEDICINE

## 2024-06-05 PROCEDURE — 3078F DIAST BP <80 MM HG: CPT | Performed by: INTERNAL MEDICINE

## 2024-06-05 PROCEDURE — 3008F BODY MASS INDEX DOCD: CPT | Performed by: INTERNAL MEDICINE

## 2024-06-05 RX ORDER — TIRZEPATIDE 7.5 MG/.5ML
7.5 INJECTION, SOLUTION SUBCUTANEOUS WEEKLY
Qty: 6 ML | Refills: 1 | Status: SHIPPED | OUTPATIENT
Start: 2024-06-05

## 2024-06-05 RX ORDER — LEVOTHYROXINE SODIUM 88 UG/1
88 TABLET ORAL
Qty: 90 TABLET | Refills: 1 | Status: SHIPPED | OUTPATIENT
Start: 2024-06-05

## 2024-06-05 NOTE — PROGRESS NOTES
Name: Evon Liriano  Date: 6/05/24    Referring Physician: No ref. provider found    HISTORY OF PRESENT ILLNESS   Evon Liriano is a 48 year old female who presents for evaluation and management of type 2 diabetes. She was diagnosed with diabetes prior to her seeing me, and I had started her on Mounjaro initially. She was doing well on this. However, her insurance stopped covering this and I had to switch her to Trulicity.     She also has a history of a pituitary microadenoma that was diagnosed in 2015 when she was having irregular menstrual cycles and elevated prolactin was discovered. She has had a repeat pituitary MRI, which shows that the microadenoma has decreased in size. She has an IUD now and does think that she is close to menopausal.     Blood Glucose Today: 77  HbA1C or glycohemoglobin is 5.5 today (and it was 6.2 on 3/06/24 and it was 5.8 on 10/11/23 and it was 5.8 on 6/22/23 and it was 6.7 on 11/01/22)  Type 1 or Type 2?: Type 2  Medications for DM: Mounjaro 7.5mg qweekly  Checking a few times per month  Fasting- below 100  2 hours after eating- 2 hours after eating- 120s  Episodes of hypoglycemia: occasionally when she does not eat for many hours, she feels verycold and if she checks blood sugars, they are in the 60s.     Dietary compliance: she is eating healthy    Exercise: she is trying to exercise    Polyuria/polydipsia: none    Blurred vision: none    Flu Vaccine This Season: yes    Covid Vaccine: yes    REVIEW OF SYSTEMS  CV: Cardiovascular disease present: none         Hypertension present: yes, at goal, not on meds         Hyperlipidemia present: not at goal, on meds (12/12/23)         Peripheral Vascular Disease present: none    : Nephropathy present: MAC: none (6/04/24) Creatinine: 1.00     Neuro: Neuropathy present: none    Eyes: Diabetic retinopathy present: no            Most recent visit to eye doctor in last 12 months: 2/28/23    Skin: Infection or ulceration:  none    Osteoporosis/ Osteopenia: severe osteopenia Vitamin D: 26.7 (6/04/24)    Thyroid disease: yes  TSH: 0.240 (6/04/24); LT4 100mcg   Medications:     Current Outpatient Medications:     MOUNJARO 7.5 MG/0.5ML Subcutaneous Solution Pen-injector, Inject 7.5 mg into the skin once a week., Disp: , Rfl:     clindamycin 150 MG Oral Cap, Take 3 capsules (450 mg total) by mouth 3 (three) times daily for 10 days., Disp: 90 capsule, Rfl: 0    PHENTERMINE HCL 37.5 MG Oral Tab, TAKE 1 TABLET(37.5 MG) BY MOUTH EVERY MORNING BEFORE BREAKFAST, Disp: 90 tablet, Rfl: 0    UNITHROID 88 MCG Oral Tab, TAKE 1 TABLET(88 MCG) BY MOUTH BEFORE BREAKFAST, Disp: 90 tablet, Rfl: 3    PARoxetine HCl 40 MG Oral Tab, Take 1 tablet (40 mg total) by mouth every morning., Disp: 90 tablet, Rfl: 3    celecoxib (CELEBREX) 200 MG Oral Cap, Take 1 capsule (200 mg total) by mouth daily., Disp: 30 capsule, Rfl: 1    cholecalciferol 25 MCG (1000 UT) Oral Tab, Take 1 tablet (1,000 Units total) by mouth daily., Disp: , Rfl:     albuterol (PROVENTIL HFA) 108 (90 Base) MCG/ACT Inhalation Aero Soln, Inhale 2 puffs into the lungs every 6 (six) hours as needed for Wheezing., Disp: 1 each, Rfl: 1    atorvastatin 10 MG Oral Tab, Take 1 tablet (10 mg total) by mouth nightly., Disp: 90 tablet, Rfl: 3    Glucose Blood (CONTOUR NEXT TEST) In Vitro Strip, Check blood sugar once daily, Disp: 100 strip, Rfl: 3    Microlet Lancets Does not apply Misc, Use to test blood sugar once daily., Disp: 100 each, Rfl: 3    MIRENA, 52 MG, 20 MCG/24HR Intrauterine IUD, , Disp: , Rfl: 0     Allergies:   Allergies   Allergen Reactions    Amoxicillin HIVES    Potassium HIVES    Vancomycin ITCHING       Social History:   Social History     Socioeconomic History    Marital status: Single   Tobacco Use    Smoking status: Never    Smokeless tobacco: Never   Vaping Use    Vaping status: Never Used   Substance and Sexual Activity    Alcohol use: Yes     Comment: socially     Drug use: No     Sexual activity: Yes     Birth control/protection: I.U.D.   Other Topics Concern    Caffeine Concern Yes     Comment: soda, tea, 8 oz daily    Pt has a pacemaker No    Pt has a defibrillator No    Reaction to local anesthetic No       Medical History:   Past Medical History:    Anxiety    Zoloft    Asthma (HCC)    Benign tumor of pituitary gland (HCC)    Bulimia nervosa in remission (HCC)    Chest pain    hospitalize    Depression    mes--stopped on own    GERD (gastroesophageal reflux disease)    Hypercholesteremia    Hypercholesteremia    IBS (irritable bowel syndrome)    Insomnia    Zoloft    MRSA (methicillin resistant Staphylococcus aureus)    Obesity (BMI 30-39.9)    MATILDE (obstructive sleep apnea)    PMDD (premenstrual dysphoric disorder)    Zoloft    Pre-diabetes    Thyroid disease    Vertigo       Surgical history:   Past Surgical History:   Procedure Laterality Date    Colonoscopy  2017    Gosia    Egd  06/29/2022    Tubal ligation      Upper gi endoscopy,exam  2011    EGD       PHYSICAL EXAM  Vitals:    06/05/24 1720   BP: 100/62   Pulse: 85   Weight: 213 lb (96.6 kg)   Height: 5' 3.5\" (1.613 m)       General Appearance:  alert, well developed, in no acute distress  Eyes:  normal conjunctivae, sclera., normal sclera and normal pupils  Neuro:  sensory grossly intact and motor grossly intact, normal monofilament exam  Psychiatric:  oriented to time, self, and place  Nutritional:  no abnormal weight gain or loss  Bilateral barefoot skin diabetic exam is normal, visualized feet and the appearance is normal.  Bilateral monofilament/sensation of both feet is normal.  Pulsation pedal pulse exam of both lower legs/feet is normal as well.        Lab Data:   Lab Results   Component Value Date     06/22/2023    A1C 6.2 (A) 03/06/2024     Lab Results   Component Value Date    GLU 90 06/04/2024    BUN 16 06/04/2024    BUNCREA 16.0 06/04/2024    CREATSERUM 1.00 06/04/2024    ANIONGAP 7 06/04/2024    GFRNAA 77  06/20/2022    GFRAA 89 06/20/2022    CA 9.9 06/04/2024    OSMOCALC 289 06/04/2024    ALKPHO 211 (H) 06/04/2024    AST 38 (H) 06/04/2024    ALT 73 (H) 06/04/2024    ALKPHOS 72 03/11/2014    BILT 0.5 06/04/2024    TP 7.9 06/04/2024    ALB 4.5 06/04/2024    GLOBULIN 3.4 06/04/2024    AGRATIO 1.2 03/11/2014     06/04/2024    K 4.6 06/04/2024     06/04/2024    CO2 27.0 06/04/2024     Lab Results   Component Value Date    CHOLEST 187 06/04/2024    TRIG 128 06/04/2024    HDL 44 06/04/2024     (H) 06/04/2024    VLDL 22 06/04/2024    NONHDLC 143 (H) 06/04/2024    CALCNONHDL 183 (H) 08/26/2013     Lab Results   Component Value Date    MALBP 2.90 06/04/2024    CREUR 245.90 06/04/2024       ASSESSMENT/PLAN:  This is a 48 year-old woman here for evaluation and management of type 2 diabetes. We discussed the ABCs of DM.     1.) Hyperglycemia Management- We discussed the importance of glycemic control to prevent complications of diabetes. We discussed the importance of SBGM. I offered and provided patient education materials and offered a blood glucose log book.   - Continue Mounjaro 7.5mg qweekly  - Check blood sugars fasting and two hours after biggest meal    2.) Management of Diabetic Complications- We discussed the complications of diabetes include retinopathy, neuropathy, nephropathy and cardiovascular disease.   - Ophtho- up to date  - Flu and Covid vaccine- up to date  - BP- at goal, on meds  - Lipids- check in 3 months, continue 20mg  - MAC- check in 3 months  - CMP- check in 3 months, LFTs elevated, if persists, will check liver USG  - Neuropathy- none  - CAD- none    3.) Lifestyle Management for Diabetes- We discussed importance of a low CHO diet, and recommend 45gm per meal or 135gm per day. We discussed the importance of trying to follow a Mediterranean diet, with an emphasis on vegetables at every meal, with lots whole grains, and protein from either plant-based sources, or poultry and fish.   -  Diet- I have given her the 'the diabetes plate', I am asking her to try and eat more complex carbs so that she does not have low blood sugars   - Exercise- she will try to increase this     4.) Hypothyroidism- I would like to decrease her Unithroid dose from 100mcg to 88mcg and recheck labs in 3 months    5.) History of Pituitary Microadenoma with mildly elevated prolactin  - Repeat MRI shows that that pituitary adenoma has decreased in size  - Repeat pituitary labs completed and within normal limits    6.) Osteopenia- check DEXA scan at next visit    7.) Weight loss- Continue 18.75mg of phentermine.     8.) Vitamin D deficiency- start cholecalciferol 2,000 international units and check vitamin D in 3 months    Return to clinic in 3 months    Prior to this encounter, I spent over 15 minutes with preparing for the visit, including reviewing documents from other specialties as well as from PCP and going over test results. During the face to face encounter, I spent an additional 15 minutes which were determined for follow-up. Greater than 50% of the time was spent in counseling, anticipatory guidance, and coordination of care. Patient concerns were answered to the best of my knowledge.       6/05/24  Hanna Jean MD

## 2024-06-27 ENCOUNTER — HOSPITAL ENCOUNTER (OUTPATIENT)
Dept: MAMMOGRAPHY | Age: 49
Discharge: HOME | End: 2024-06-27
Attending: FAMILY MEDICINE
Payer: COMMERCIAL

## 2024-06-27 DIAGNOSIS — Z12.31 SCREENING MAMMOGRAM, ENCOUNTER FOR: ICD-10-CM

## 2024-06-27 PROCEDURE — 77067 SCR MAMMO BI INCL CAD: CPT | Performed by: FAMILY MEDICINE

## 2024-06-27 PROCEDURE — 77063 BREAST TOMOSYNTHESIS BI: CPT | Performed by: FAMILY MEDICINE

## 2024-07-01 ENCOUNTER — PATIENT MESSAGE (OUTPATIENT)
Dept: ENDOCRINOLOGY CLINIC | Facility: CLINIC | Age: 49
End: 2024-07-01

## 2024-07-06 ENCOUNTER — HOSPITAL ENCOUNTER (OUTPATIENT)
Dept: BONE DENSITY | Age: 49
Discharge: HOME | End: 2024-07-06
Attending: INTERNAL MEDICINE
Payer: COMMERCIAL

## 2024-07-06 DIAGNOSIS — M85.80 OSTEOPENIA, UNSPECIFIED LOCATION: ICD-10-CM

## 2024-07-06 PROCEDURE — 77080 DXA BONE DENSITY AXIAL: CPT | Performed by: INTERNAL MEDICINE

## 2024-07-11 NOTE — TELEPHONE ENCOUNTER
Dr Jean,    Patient reports she has been taking phentermine 37.5 MG and mounjaro 7.5 mg since 6/5 without noticing any effect on appetite or sugar cravings.     Please advise,    thanks

## 2024-07-13 RX ORDER — TIRZEPATIDE 10 MG/.5ML
10 INJECTION, SOLUTION SUBCUTANEOUS
Qty: 2 ML | Refills: 2 | Status: SHIPPED | OUTPATIENT
Start: 2024-07-13

## 2024-08-07 ENCOUNTER — OFFICE VISIT (OUTPATIENT)
Dept: FAMILY MEDICINE CLINIC | Facility: CLINIC | Age: 49
End: 2024-08-07
Payer: COMMERCIAL

## 2024-08-07 VITALS
SYSTOLIC BLOOD PRESSURE: 112 MMHG | BODY MASS INDEX: 34.65 KG/M2 | HEART RATE: 81 BPM | HEIGHT: 63.5 IN | DIASTOLIC BLOOD PRESSURE: 76 MMHG | WEIGHT: 198 LBS

## 2024-08-07 DIAGNOSIS — Z01.419 ENCOUNTER FOR GYNECOLOGICAL EXAMINATION: ICD-10-CM

## 2024-08-07 DIAGNOSIS — G47.33 MILD OBSTRUCTIVE SLEEP APNEA: ICD-10-CM

## 2024-08-07 DIAGNOSIS — D35.2 PITUITARY MICROADENOMA (HCC): ICD-10-CM

## 2024-08-07 DIAGNOSIS — F32.A ANXIETY AND DEPRESSION: ICD-10-CM

## 2024-08-07 DIAGNOSIS — K21.9 GASTROESOPHAGEAL REFLUX DISEASE, UNSPECIFIED WHETHER ESOPHAGITIS PRESENT: ICD-10-CM

## 2024-08-07 DIAGNOSIS — K59.09 CHRONIC CONSTIPATION: ICD-10-CM

## 2024-08-07 DIAGNOSIS — E78.00 HYPERCHOLESTEREMIA: ICD-10-CM

## 2024-08-07 DIAGNOSIS — F41.9 ANXIETY AND DEPRESSION: ICD-10-CM

## 2024-08-07 DIAGNOSIS — E55.9 VITAMIN D DEFICIENCY: ICD-10-CM

## 2024-08-07 DIAGNOSIS — L74.9 SWEATING ABNORMALITY: ICD-10-CM

## 2024-08-07 DIAGNOSIS — Z97.5 IUD CONTRACEPTION: ICD-10-CM

## 2024-08-07 DIAGNOSIS — J45.998 SEASONAL ASTHMA (HCC): ICD-10-CM

## 2024-08-07 DIAGNOSIS — Z00.00 WELL ADULT EXAM: Primary | ICD-10-CM

## 2024-08-07 DIAGNOSIS — E03.9 HYPOTHYROIDISM, UNSPECIFIED TYPE: ICD-10-CM

## 2024-08-07 DIAGNOSIS — E11.65 TYPE 2 DIABETES MELLITUS WITH HYPERGLYCEMIA, WITHOUT LONG-TERM CURRENT USE OF INSULIN (HCC): ICD-10-CM

## 2024-08-07 DIAGNOSIS — E66.9 OBESITY (BMI 30-39.9): ICD-10-CM

## 2024-08-07 PROBLEM — Z51.81 ENCOUNTER FOR THERAPEUTIC DRUG MONITORING: Status: RESOLVED | Noted: 2017-12-06 | Resolved: 2024-08-07

## 2024-08-07 PROBLEM — R73.03 PRE-DIABETES: Status: RESOLVED | Noted: 2021-12-06 | Resolved: 2024-08-07

## 2024-08-07 PROBLEM — E78.5 DYSLIPIDEMIA: Status: RESOLVED | Noted: 2023-04-15 | Resolved: 2024-08-07

## 2024-08-07 PROBLEM — H52.4 PRESBYOPIA OF BOTH EYES: Status: RESOLVED | Noted: 2023-02-28 | Resolved: 2024-08-07

## 2024-08-07 PROBLEM — R74.8 ELEVATED ALKALINE PHOSPHATASE LEVEL: Status: RESOLVED | Noted: 2020-03-05 | Resolved: 2024-08-07

## 2024-08-07 PROBLEM — M85.80 OSTEOPENIA: Status: RESOLVED | Noted: 2024-06-05 | Resolved: 2024-08-07

## 2024-08-07 PROBLEM — E66.812 CLASS 2 SEVERE OBESITY DUE TO EXCESS CALORIES WITH SERIOUS COMORBIDITY AND BODY MASS INDEX (BMI) OF 37.0 TO 37.9 IN ADULT (HCC): Status: RESOLVED | Noted: 2022-07-13 | Resolved: 2024-08-07

## 2024-08-07 PROBLEM — E11.9 DIABETES MELLITUS TYPE 2 WITHOUT RETINOPATHY (HCC): Status: RESOLVED | Noted: 2023-02-28 | Resolved: 2024-08-07

## 2024-08-07 PROBLEM — E66.01 CLASS 2 SEVERE OBESITY DUE TO EXCESS CALORIES WITH SERIOUS COMORBIDITY AND BODY MASS INDEX (BMI) OF 37.0 TO 37.9 IN ADULT (HCC): Status: RESOLVED | Noted: 2022-07-13 | Resolved: 2024-08-07

## 2024-08-07 PROBLEM — Z82.79 FAMILY HISTORY OF CONGENITAL HEART DISEASE: Status: RESOLVED | Noted: 2021-11-11 | Resolved: 2024-08-07

## 2024-08-07 PROCEDURE — 3078F DIAST BP <80 MM HG: CPT | Performed by: FAMILY MEDICINE

## 2024-08-07 PROCEDURE — 3008F BODY MASS INDEX DOCD: CPT | Performed by: FAMILY MEDICINE

## 2024-08-07 PROCEDURE — 99214 OFFICE O/P EST MOD 30 MIN: CPT | Performed by: FAMILY MEDICINE

## 2024-08-07 PROCEDURE — 3074F SYST BP LT 130 MM HG: CPT | Performed by: FAMILY MEDICINE

## 2024-08-07 PROCEDURE — 99396 PREV VISIT EST AGE 40-64: CPT | Performed by: FAMILY MEDICINE

## 2024-08-07 RX ORDER — PAROXETINE HYDROCHLORIDE 40 MG/1
40 TABLET, FILM COATED ORAL EVERY MORNING
Qty: 90 TABLET | Refills: 3 | Status: SHIPPED | OUTPATIENT
Start: 2024-08-07

## 2024-08-07 RX ORDER — ATORVASTATIN CALCIUM 20 MG/1
20 TABLET, FILM COATED ORAL NIGHTLY
Qty: 90 TABLET | Refills: 3 | Status: SHIPPED | OUTPATIENT
Start: 2024-08-07

## 2024-08-07 RX ORDER — ALBUTEROL SULFATE 90 UG/1
2 AEROSOL, METERED RESPIRATORY (INHALATION) EVERY 6 HOURS PRN
Qty: 1 EACH | Refills: 0 | Status: SHIPPED | OUTPATIENT
Start: 2024-08-07

## 2024-08-07 NOTE — PROGRESS NOTES
HPI:    Patient ID: Evon Liriano is a 48 year old female.    Gyn Exam  Pertinent negatives include no abdominal pain, arthralgias, chest pain, chills, congestion, coughing, fatigue, fever, headaches, myalgias, nausea, numbness, rash, sore throat, vomiting or weakness.     Chief Complaint   Patient presents with    Routine Physical    Gyn Exam       Wt Readings from Last 6 Encounters:   08/07/24 198 lb (89.8 kg)   06/05/24 213 lb (96.6 kg)   03/06/24 222 lb (100.7 kg)   10/11/23 212 lb (96.2 kg)   09/01/23 201 lb (91.2 kg)   08/29/23 207 lb 0.2 oz (93.9 kg)     BP Readings from Last 3 Encounters:   08/07/24 112/76   06/05/24 100/62   03/06/24 108/68     Single  2 grown boys  Non smoker   IUD placed in 2017, Mirena  Not sexually active       Review of Systems   Constitutional:  Negative for activity change, appetite change, chills, fatigue, fever and unexpected weight change.   HENT:  Negative for congestion, dental problem, drooling, ear discharge, ear pain, facial swelling, hearing loss, mouth sores, nosebleeds, postnasal drip, rhinorrhea, sinus pressure, sinus pain, sneezing, sore throat, tinnitus, trouble swallowing and voice change.    Eyes:  Negative for pain, discharge, redness and visual disturbance.   Respiratory:  Negative for cough, shortness of breath and wheezing.    Cardiovascular:  Negative for chest pain, palpitations and leg swelling.   Gastrointestinal:  Positive for constipation. Negative for abdominal pain, anal bleeding, blood in stool, diarrhea, nausea, rectal pain and vomiting.        Chronic constipation, up to date on colonoscopy, miralax doesn't work   Endocrine: Negative for cold intolerance, heat intolerance, polydipsia, polyphagia and polyuria.   Genitourinary:  Negative for decreased urine volume, difficulty urinating, dysuria, flank pain, frequency, menstrual problem, pelvic pain, urgency, vaginal bleeding, vaginal discharge and vaginal pain.        IUD     Musculoskeletal:   Negative for arthralgias, back pain and myalgias.   Skin:  Negative for rash.   Neurological:  Negative for dizziness, seizures, syncope, weakness, numbness and headaches.   Hematological:  Does not bruise/bleed easily.   Psychiatric/Behavioral:  Negative for behavioral problems, decreased concentration, self-injury, sleep disturbance and suicidal ideas. The patient is not nervous/anxious.        /76   Pulse 81   Ht 5' 3.5\" (1.613 m)   Wt 198 lb (89.8 kg)   BMI 34.52 kg/m²     Past Medical History:    Anxiety    Zoloft    Asthma (HCC)    Benign tumor of pituitary gland (HCC)    Bulimia nervosa in remission (HCC)    Chest pain    hospitalize    Depression    mes--stopped on own    GERD (gastroesophageal reflux disease)    Hypercholesteremia    Hypercholesteremia    IBS (irritable bowel syndrome)    Insomnia    Zoloft    MRSA (methicillin resistant Staphylococcus aureus)    Obesity (BMI 30-39.9)    MATILDE (obstructive sleep apnea)    PMDD (premenstrual dysphoric disorder)    Zoloft    Pre-diabetes    Thyroid disease    Vertigo     Past Surgical History:   Procedure Laterality Date    Colonoscopy  2017    Gosia    Egd  06/29/2022    Tubal ligation      Upper gi endoscopy,exam  2011    EGD     Social History     Socioeconomic History    Marital status: Single     Spouse name: Not on file    Number of children: Not on file    Years of education: Not on file    Highest education level: Not on file   Occupational History    Not on file   Tobacco Use    Smoking status: Never    Smokeless tobacco: Never   Vaping Use    Vaping status: Never Used   Substance and Sexual Activity    Alcohol use: Yes     Comment: socially     Drug use: No    Sexual activity: Yes     Birth control/protection: I.U.D.   Other Topics Concern     Service Not Asked    Blood Transfusions Not Asked    Caffeine Concern Yes     Comment: soda, tea, 8 oz daily    Occupational Exposure Not Asked    Hobby Hazards Not Asked    Sleep Concern Not  Asked    Stress Concern Not Asked    Weight Concern Not Asked    Special Diet Not Asked    Back Care Not Asked    Exercise Not Asked    Bike Helmet Not Asked    Seat Belt Not Asked    Self-Exams Not Asked    Grew up on a farm Not Asked    History of tanning Not Asked    Outdoor occupation Not Asked    Pt has a pacemaker No    Pt has a defibrillator No    Breast feeding Not Asked    Reaction to local anesthetic No   Social History Narrative    Not on file     Social Determinants of Health     Financial Resource Strain: Not on file   Food Insecurity: Not on file   Transportation Needs: Not on file   Physical Activity: Not on file   Stress: Not on file   Social Connections: Not on file   Housing Stability: Not on file     Family History   Problem Relation Age of Onset    Hypertension Mother     Hypertension Father     Heart Disease Maternal Grandmother     Other (lymphoma) Maternal Grandmother     Other (lung cancer) Maternal Grandfather     Diabetes Paternal Grandmother     Cancer Other         lymphoma, lung    Breast Cancer Other         mat great aunt age 50s    Glaucoma Neg     Macular degeneration Neg        Immunization History   Administered Date(s) Administered    Covid-19 Vaccine Pfizer 30 mcg/0.3 ml 04/20/2021, 05/14/2021    TDAP 10/05/2022       Health Maintenance   Topic Date Due    Asthma Action Plan  Never done    Pneumococcal Vaccine: Birth to 64yrs (1 of 2 - PCV) Never done    COVID-19 Vaccine (3 - 2023-24 season) 09/01/2023    Annual Depression Screening  01/01/2024    Diabetes Care Dilated Eye Exam  02/28/2024    Annual Physical  06/29/2024    Zoster Vaccines (1 of 2) 09/16/2024    Influenza Vaccine (1) 10/01/2024    Diabetes Care A1C  12/05/2024    Diabetes Care Foot Exam  03/06/2025    Diabetes Care: GFR  06/04/2025    Diabetes Care: Microalb/Creat Ratio  06/04/2025    Mammogram  06/27/2025    Asthma Control Test  08/07/2025    Pap Smear  06/29/2026    Colorectal Cancer Screening  03/07/2027     DTaP,Tdap,and Td Vaccines (2 - Td or Tdap) 10/05/2032          Current Outpatient Medications   Medication Sig Dispense Refill    MAGNESIUM OR Take by mouth.      GARLIC OR Take by mouth.      albuterol 108 (90 Base) MCG/ACT Inhalation Aero Soln Inhale 2 puffs into the lungs every 6 (six) hours as needed for Wheezing or Shortness of Breath. 1 each 0    atorvastatin 20 MG Oral Tab Take 1 tablet (20 mg total) by mouth nightly. 90 tablet 3    PARoxetine HCl 40 MG Oral Tab Take 1 tablet (40 mg total) by mouth every morning. 90 tablet 3    Tirzepatide (MOUNJARO) 10 MG/0.5ML Subcutaneous Solution Pen-injector Inject 10 mg into the skin every 7 days. 2 mL 2    UNITHROID 88 MCG Oral Tab Take 1 tablet (88 mcg total) by mouth before breakfast. 90 tablet 1    PHENTERMINE HCL 37.5 MG Oral Tab TAKE 1 TABLET(37.5 MG) BY MOUTH EVERY MORNING BEFORE BREAKFAST 90 tablet 0    Glucose Blood (CONTOUR NEXT TEST) In Vitro Strip Check blood sugar once daily 100 strip 3    Microlet Lancets Does not apply Misc Use to test blood sugar once daily. 100 each 3    MIRENA, 52 MG, 20 MCG/24HR Intrauterine IUD   0    UNITHROID 88 MCG Oral Tab TAKE 1 TABLET(88 MCG) BY MOUTH BEFORE BREAKFAST 90 tablet 3    cholecalciferol 25 MCG (1000 UT) Oral Tab Take 1 tablet (1,000 Units total) by mouth daily.       Allergies:  Allergies   Allergen Reactions    Amoxicillin HIVES    Potassium HIVES    Vancomycin ITCHING      PHYSICAL EXAM:     Chief Complaint   Patient presents with    Routine Physical    Gyn Exam      Physical Exam  Vitals and nursing note reviewed.   Constitutional:       Appearance: She is well-developed.   HENT:      Head: Normocephalic and atraumatic.      Right Ear: External ear normal.      Left Ear: External ear normal.      Nose: Nose normal.      Mouth/Throat:      Pharynx: No oropharyngeal exudate.   Eyes:      General:         Right eye: No discharge.         Left eye: No discharge.      Conjunctiva/sclera: Conjunctivae normal.       Pupils: Pupils are equal, round, and reactive to light.   Neck:      Thyroid: No thyromegaly.   Cardiovascular:      Rate and Rhythm: Normal rate and regular rhythm.      Heart sounds: Normal heart sounds. No murmur heard.  Pulmonary:      Effort: Pulmonary effort is normal.      Breath sounds: Normal breath sounds. No wheezing.   Chest:   Breasts:     Breasts are symmetrical.      Right: Normal.      Left: Normal.   Abdominal:      General: Bowel sounds are normal.      Palpations: Abdomen is soft. There is no mass.      Tenderness: There is no abdominal tenderness.   Genitourinary:     Labia:         Right: No rash, tenderness, lesion or injury.         Left: No rash, tenderness, lesion or injury.       Vagina: Normal. No vaginal discharge.      Cervix: No cervical motion tenderness, discharge or friability.      Adnexa:         Right: No mass, tenderness or fullness.          Left: No mass, tenderness or fullness.        Comments: IUD strings noted  Unable to remove IUD, resistant to pull   Musculoskeletal:         General: No tenderness.      Cervical back: Normal range of motion and neck supple.   Lymphadenopathy:      Cervical: No cervical adenopathy.      Upper Body:      Right upper body: No supraclavicular or axillary adenopathy.      Left upper body: No supraclavicular or axillary adenopathy.   Skin:     General: Skin is dry.      Findings: No rash.   Neurological:      Mental Status: She is alert and oriented to person, place, and time.      Cranial Nerves: No cranial nerve deficit.      Motor: No abnormal muscle tone.      Coordination: Coordination normal.      Deep Tendon Reflexes: Reflexes are normal and symmetric. Reflexes normal.   Psychiatric:         Behavior: Behavior normal.         Thought Content: Thought content normal.         Judgment: Judgment normal.       Bilateral barefoot skin diabetic exam is normal, visualized feet and the appearance is normal.  Bilateral monofilament/sensation of  both feet is normal.  Pulsation pedal pulse exam of both lower legs/feet is normal as well.             ASSESSMENT/PLAN:     Return yearly for physicals  Follow up with dentist every 6 months  Follow up with eye doctor yearly  Recommend aerobic exercise for at least 30mins 5 days a week  Yearly flu shot  Tetanus booster every 10 years (Tdap/ Td)  Labs ordered/ or reviewed if done prior to appointment     Encounter Diagnoses   Name Primary?    Well adult exam Yes    Anxiety and depression     Hypercholesteremia     Encounter for gynecological examination     Type 2 diabetes mellitus with hyperglycemia, without long-term current use of insulin (HCC)     Hypothyroidism, unspecified type     Mild obstructive sleep apnea     Obesity (BMI 30-39.9)     Pituitary microadenoma (HCC)     Gastroesophageal reflux disease, unspecified whether esophagitis present     Seasonal asthma (HCC)     Pre-diabetes     IUD contraception     Vitamin D deficiency     Sweating abnormality     Chronic constipation        1. Well adult exam    - CBC With Differential With Platelet; Future    2. Anxiety and depression  Stable condition  Reviewed medications  Continue current medication management   All questions answered to the best of my ability.    - PARoxetine HCl 40 MG Oral Tab; Take 1 tablet (40 mg total) by mouth every morning.  Dispense: 90 tablet; Refill: 3    3. Hypercholesteremia  Elevated  Recommend increasing to 20mg  Reviewed medications  Continue current medication management   All questions answered to the best of my ability.    - atorvastatin 20 MG Oral Tab; Take 1 tablet (20 mg total) by mouth nightly.  Dispense: 90 tablet; Refill: 3    4. Encounter for gynecological examination  Pelvic and breast exam done    5. Type 2 diabetes mellitus with hyperglycemia, without long-term current use of insulin (HCC)  Controlled  Continue diet control   - OPHTHALMOLOGY - INTERNAL    6. Hypothyroidism, unspecified type  Seeing endo     7. Mild  obstructive sleep apnea  Weight better  Symptoms better     8. Obesity (BMI 30-39.9)  improved    9. Pituitary microadenoma (HCC)  Follow up endo    10. Gastroesophageal reflux disease, unspecified whether esophagitis present  stable    11. Seasonal asthma (HCC)  Stable  Declines prevnar  - albuterol 108 (90 Base) MCG/ACT Inhalation Aero Soln; Inhale 2 puffs into the lungs every 6 (six) hours as needed for Wheezing or Shortness of Breath.  Dispense: 1 each; Refill: 0        13. IUD contraception  Attempted removal  Unsuccessful  Follow up gyne  - OBG - INTERNAL    14. Vitamin D deficiency  replaced    15. Sweating abnormality    - LH (Luteinizing Hormone); Future  - FSH; Future  - Estradiol [E]; Future    16. Chronic constipation  Pelvic floor therapy  - PHYSICAL THERAPY - INTERNAL      Orders Placed This Encounter   Procedures    CBC With Differential With Platelet    LH (Luteinizing Hormone)    FSH    Estradiol [E]       The above note was creating using Dragon speech recognition technology. Please excuse any typos    Meds This Visit:  Requested Prescriptions     Signed Prescriptions Disp Refills    albuterol 108 (90 Base) MCG/ACT Inhalation Aero Soln 1 each 0     Sig: Inhale 2 puffs into the lungs every 6 (six) hours as needed for Wheezing or Shortness of Breath.    atorvastatin 20 MG Oral Tab 90 tablet 3     Sig: Take 1 tablet (20 mg total) by mouth nightly.    PARoxetine HCl 40 MG Oral Tab 90 tablet 3     Sig: Take 1 tablet (40 mg total) by mouth every morning.       Imaging & Referrals:  OPHTHALMOLOGY - INTERNAL  OBG - INTERNAL  PHYSICAL THERAPY - INTERNAL       ID#[id number]

## 2024-08-13 DIAGNOSIS — E78.00 HYPERCHOLESTEREMIA: ICD-10-CM

## 2024-08-13 DIAGNOSIS — E66.01 CLASS 2 SEVERE OBESITY DUE TO EXCESS CALORIES WITH SERIOUS COMORBIDITY AND BODY MASS INDEX (BMI) OF 38.0 TO 38.9 IN ADULT (HCC): ICD-10-CM

## 2024-08-13 DIAGNOSIS — E11.65 TYPE 2 DIABETES MELLITUS WITH HYPERGLYCEMIA, WITHOUT LONG-TERM CURRENT USE OF INSULIN (HCC): ICD-10-CM

## 2024-08-14 NOTE — TELEPHONE ENCOUNTER
Endocrine Refill protocol for oral medications    Protocol Criteria:  PASSED    -Appointment with Endocrinology completed in the last 6 months or scheduled in the next 3 months     Verify the above has been completed or scheduled in the appropriate timeline. If so can send a 90 day supply with 1 refill.     Last completed office visit: 6/5/2024 Hanna Jean MD   Next scheduled Follow up:   Future Appointments   Date Time Provider Department Center   9/4/2024  5:15 PM [facility]

## 2024-08-16 RX ORDER — ATORVASTATIN CALCIUM 10 MG/1
10 TABLET, FILM COATED ORAL NIGHTLY
Qty: 90 TABLET | Refills: 3 | OUTPATIENT
Start: 2024-08-16

## 2024-08-16 NOTE — TELEPHONE ENCOUNTER
Atorvastatin 10mg: dose increased to Atorvastatin 20mg- Script sent to [facility] in [place] on 08/07/2024 for 1 year supply.

## 2024-08-20 DIAGNOSIS — E66.01 CLASS 2 SEVERE OBESITY DUE TO EXCESS CALORIES WITH SERIOUS COMORBIDITY AND BODY MASS INDEX (BMI) OF 38.0 TO 38.9 IN ADULT (HCC): ICD-10-CM

## 2024-08-20 DIAGNOSIS — E11.65 TYPE 2 DIABETES MELLITUS WITH HYPERGLYCEMIA, WITHOUT LONG-TERM CURRENT USE OF INSULIN (HCC): ICD-10-CM

## 2024-08-20 RX ORDER — PHENTERMINE HYDROCHLORIDE 37.5 MG/1
37.5 TABLET ORAL
Qty: 90 TABLET | Refills: 0 | Status: SHIPPED | OUTPATIENT
Start: 2024-08-20 | End: 2024-08-26

## 2024-08-20 NOTE — TELEPHONE ENCOUNTER
Refill was routed to doctor 8/13/24. Refill not yet signed. Sending mychart updating pt that it was sent to doctor for review and approval.

## 2024-08-21 RX ORDER — PHENTERMINE HYDROCHLORIDE 37.5 MG/1
37.5 TABLET ORAL
Qty: 90 TABLET | Refills: 0 | Status: SHIPPED | OUTPATIENT
Start: 2024-08-21

## 2024-08-23 ENCOUNTER — TELEPHONE (OUTPATIENT)
Dept: FAMILY MEDICINE CLINIC | Facility: CLINIC | Age: 49
End: 2024-08-23

## 2024-08-23 NOTE — TELEPHONE ENCOUNTER
Spoke to patient. She was in to see Dr. Bella on 8/7 and tried to get her IUD removed. Ever since then, she has felt a sharp pain when sitting and gets occasional cramping. She hoped to see Dr. Bella today instead of Monday.     She denies severe pain, bleeding or fever.     Dr. Bella off today and tomorrow.     If pain worsens, recommended to go to .     She will keep appointment on Monday.

## 2024-08-26 ENCOUNTER — HOSPITAL ENCOUNTER (OUTPATIENT)
Dept: ULTRASOUND IMAGING | Facility: HOSPITAL | Age: 49
Discharge: HOME | End: 2024-08-26
Attending: FAMILY MEDICINE
Payer: COMMERCIAL

## 2024-08-26 ENCOUNTER — OFFICE VISIT (OUTPATIENT)
Dept: FAMILY MEDICINE CLINIC | Facility: CLINIC | Age: 49
End: 2024-08-26
Payer: COMMERCIAL

## 2024-08-26 ENCOUNTER — TELEPHONE (OUTPATIENT)
Dept: OBGYN CLINIC | Facility: CLINIC | Age: 49
End: 2024-08-26

## 2024-08-26 VITALS
DIASTOLIC BLOOD PRESSURE: 69 MMHG | HEART RATE: 103 BPM | WEIGHT: 193 LBS | BODY MASS INDEX: 33.77 KG/M2 | SYSTOLIC BLOOD PRESSURE: 101 MMHG | HEIGHT: 63.5 IN

## 2024-08-26 DIAGNOSIS — R10.2 PELVIC CRAMPING: Primary | ICD-10-CM

## 2024-08-26 DIAGNOSIS — F32.A ANXIETY AND DEPRESSION: ICD-10-CM

## 2024-08-26 DIAGNOSIS — L74.9 SWEATING ABNORMALITY: ICD-10-CM

## 2024-08-26 DIAGNOSIS — R10.2 PELVIC CRAMPING: ICD-10-CM

## 2024-08-26 DIAGNOSIS — Z30.431 IUD CHECK UP: ICD-10-CM

## 2024-08-26 DIAGNOSIS — R23.2 HOT FLASHES: ICD-10-CM

## 2024-08-26 DIAGNOSIS — F41.9 ANXIETY AND DEPRESSION: ICD-10-CM

## 2024-08-26 PROCEDURE — 76830 TRANSVAGINAL US NON-OB: CPT | Performed by: FAMILY MEDICINE

## 2024-08-26 PROCEDURE — 99214 OFFICE O/P EST MOD 30 MIN: CPT | Performed by: FAMILY MEDICINE

## 2024-08-26 PROCEDURE — 3078F DIAST BP <80 MM HG: CPT | Performed by: FAMILY MEDICINE

## 2024-08-26 PROCEDURE — 3008F BODY MASS INDEX DOCD: CPT | Performed by: FAMILY MEDICINE

## 2024-08-26 PROCEDURE — 76856 US EXAM PELVIC COMPLETE: CPT | Performed by: FAMILY MEDICINE

## 2024-08-26 PROCEDURE — 3074F SYST BP LT 130 MM HG: CPT | Performed by: FAMILY MEDICINE

## 2024-08-26 RX ORDER — VENLAFAXINE HYDROCHLORIDE 150 MG/1
150 CAPSULE, EXTENDED RELEASE ORAL DAILY
Qty: 90 CAPSULE | Refills: 0 | Status: SHIPPED | OUTPATIENT
Start: 2024-08-26 | End: 2025-08-21

## 2024-08-26 NOTE — TELEPHONE ENCOUNTER
Domingo Patel, DO  You; Kristy Spangler MD; Karuna Coulter MD; Jean-Claude Villanueva DO; Susan Bledsoe MD; Abigail Pleitez MD; [facility] Ob/Gyne Clinical Staff3 minutes ago (4:22 PM)       1130 Friday ADO if no one can add sooner

## 2024-08-26 NOTE — TELEPHONE ENCOUNTER
Pt accepted appt with BOB for Thursday 8/29 at 1pm. Message to [employer] to please open slot and add pt. Thank you!

## 2024-08-26 NOTE — TELEPHONE ENCOUNTER
Bernard Bella MD  P [facility] Ob/Gyne Clinical Staff  Good morning  Can this patient be seen sooner by anyone, has an IUD (mirena) 6 yrs old. Has pelvic cramping and tender cervix, some spotting. Attempted IUD removal 2 1/2  weeks ago and since then has cramping. IUD was resistant so aborted removal.  She's getting pelvic us today  Thank you!!    Left message to call back-can offer 1 pm with Dr. Patel tomorrow.

## 2024-08-26 NOTE — TELEPHONE ENCOUNTER
Appt with SUNNY tomorrow at 1pm no longer available.     Message to all MDs to please see message below from Dr. Bella. Can anyone add pt for exam? Next available 20 min slot with any MD is not until 9/25.

## 2024-08-26 NOTE — PROGRESS NOTES
HPI:    Patient ID: Evon Liriano is a 48 year old female.      IUD  Pertinent negatives include no chills or fever.       Chief Complaint   Patient presents with    IUD     Check has been having cramps, spotting  and feels like something is poking her since 2.5 weeks gyne had no openings until November.        Wt Readings from Last 6 Encounters:   08/26/24 193 lb (87.5 kg)   08/07/24 198 lb (89.8 kg)   06/05/24 213 lb (96.6 kg)   03/06/24 222 lb (100.7 kg)   10/11/23 212 lb (96.2 kg)   09/01/23 201 lb (91.2 kg)     BP Readings from Last 3 Encounters:   08/26/24 101/69   08/07/24 112/76   06/05/24 100/62     Here for IUD check  Has a mirena since 6 yrs    Was here 2 1/2 weeks ago and I attempted to pull the IUD but was getting stuck so  procedure was aborted    Feels pelvic cramping and also when she sits she feels a poke.  Has noticed some vaginal spotting    No fevers  No trouble urinating    Review of Systems   Constitutional:  Negative for chills and fever.   Genitourinary:  Positive for pelvic pain and vaginal bleeding. Negative for decreased urine volume, difficulty urinating, dysuria, urgency, vaginal discharge and vaginal pain.   Musculoskeletal:  Negative for back pain.       /69   Pulse 103   Ht 5' 3.5\" (1.613 m)   Wt 193 lb (87.5 kg)   BMI 33.65 kg/m²          Current Outpatient Medications   Medication Sig Dispense Refill    venlafaxine ER (EFFEXOR XR) 150 MG Oral Capsule SR 24 Hr Take 1 capsule (150 mg total) by mouth daily. 90 capsule 0    PHENTERMINE HCL 37.5 MG Oral Tab TAKE 1 TABLET(37.5 MG) BY MOUTH EVERY MORNING BEFORE BREAKFAST 90 tablet 0    MAGNESIUM OR Take by mouth.      GARLIC OR Take by mouth.      albuterol 108 (90 Base) MCG/ACT Inhalation Aero Soln Inhale 2 puffs into the lungs every 6 (six) hours as needed for Wheezing or Shortness of Breath. 1 each 0    atorvastatin 20 MG Oral Tab Take 1 tablet (20 mg total) by mouth nightly. 90 tablet 3    Tirzepatide (MOUNJARO) 10  MG/0.5ML Subcutaneous Solution Pen-injector Inject 10 mg into the skin every 7 days. 2 mL 2    UNITHROID 88 MCG Oral Tab Take 1 tablet (88 mcg total) by mouth before breakfast. 90 tablet 1    cholecalciferol 25 MCG (1000 UT) Oral Tab Take 1 tablet (1,000 Units total) by mouth daily.      Glucose Blood (CONTOUR NEXT TEST) In Vitro Strip Check blood sugar once daily 100 strip 3    Microlet Lancets Does not apply Misc Use to test blood sugar once daily. 100 each 3    MIRENA, 52 MG, 20 MCG/24HR Intrauterine IUD   0     Allergies:  Allergies   Allergen Reactions    Amoxicillin HIVES    Potassium HIVES    Vancomycin ITCHING      PHYSICAL EXAM:     Chief Complaint   Patient presents with    IUD     Check has been having cramps, spotting  and feels like something is poking her since 2.5 weeks gyne had no openings until November.       Physical Exam  Vitals reviewed.   Abdominal:      General: There is no distension.      Palpations: There is no mass.      Tenderness: There is abdominal tenderness. There is no right CVA tenderness, left CVA tenderness, guarding or rebound.      Hernia: No hernia is present.   Genitourinary:     Pubic Area: No rash.       Labia:         Right: No rash, tenderness, lesion or injury.         Left: No rash, tenderness, lesion or injury.       Cervix: Cervical motion tenderness present. No discharge, friability, lesion, erythema, cervical bleeding or eversion.   Neurological:      Mental Status: She is alert.                ASSESSMENT/PLAN:     Encounter Diagnoses   Name Primary?    Pelvic cramping Yes    IUD check up     Sweating abnormality     Hot flashes     Anxiety and depression        1. Pelvic cramping  Stat pelvic us   - US PELVIS (TRANSABDOMINAL AND TRANSVAGINAL) (CPT=76856/01271); Future    Message sent to gyne to see if patient can be seen sooner.  If pain worsening to go to ER    2. IUD check up    - US PELVIS (TRANSABDOMINAL AND TRANSVAGINAL) (CPT=76856/16655); Future    3. Sweating  abnormality  Discussed meds  Patient would like to switch to effexor  Medication side effects discussed  - venlafaxine ER (EFFEXOR XR) 150 MG Oral Capsule SR 24 Hr; Take 1 capsule (150 mg total) by mouth daily.  Dispense: 90 capsule; Refill: 0    4. Hot flashes  As above  - venlafaxine ER (EFFEXOR XR) 150 MG Oral Capsule SR 24 Hr; Take 1 capsule (150 mg total) by mouth daily.  Dispense: 90 capsule; Refill: 0    5. Anxiety and depression  Take medications as directed. Side effects/ risks discussed and patient verbalized understanding of plan. To follow up if symptoms worsen.    - venlafaxine ER (EFFEXOR XR) 150 MG Oral Capsule SR 24 Hr; Take 1 capsule (150 mg total) by mouth daily.  Dispense: 90 capsule; Refill: 0      No orders of the defined types were placed in this encounter.        The above note was creating using Dragon speech recognition technology. Please excuse any typos    Meds This Visit:  Requested Prescriptions     Signed Prescriptions Disp Refills    venlafaxine ER (EFFEXOR XR) 150 MG Oral Capsule SR 24 Hr 90 capsule 0     Sig: Take 1 capsule (150 mg total) by mouth daily.       Imaging & Referrals:  US PELVIS (TRANSABDOMINAL AND TRANSVAGINAL) (CPT=76856/90542)       ID#[id number]

## 2024-08-29 ENCOUNTER — OFFICE VISIT (OUTPATIENT)
Dept: OBGYN CLINIC | Facility: CLINIC | Age: 49
End: 2024-08-29
Payer: COMMERCIAL

## 2024-08-29 VITALS
WEIGHT: 191 LBS | HEART RATE: 94 BPM | SYSTOLIC BLOOD PRESSURE: 92 MMHG | DIASTOLIC BLOOD PRESSURE: 65 MMHG | BODY MASS INDEX: 33 KG/M2

## 2024-08-29 DIAGNOSIS — Z30.432 ENCOUNTER FOR REMOVAL OF INTRAUTERINE CONTRACEPTIVE DEVICE: ICD-10-CM

## 2024-08-29 DIAGNOSIS — T83.32XA MALPOSITIONED INTRAUTERINE DEVICE (IUD), INITIAL ENCOUNTER: Primary | ICD-10-CM

## 2024-08-29 PROCEDURE — 3074F SYST BP LT 130 MM HG: CPT | Performed by: OBSTETRICS & GYNECOLOGY

## 2024-08-29 PROCEDURE — 58301 REMOVE INTRAUTERINE DEVICE: CPT | Performed by: OBSTETRICS & GYNECOLOGY

## 2024-08-29 PROCEDURE — 99203 OFFICE O/P NEW LOW 30 MIN: CPT | Performed by: OBSTETRICS & GYNECOLOGY

## 2024-08-29 PROCEDURE — 3078F DIAST BP <80 MM HG: CPT | Performed by: OBSTETRICS & GYNECOLOGY

## 2024-08-29 NOTE — PROGRESS NOTES
Evon Liriano is a 48 year old female  No LMP recorded. (Menstrual status: IUD - Intrauterine Device).   Chief Complaint   Patient presents with    Gyn Problem     New pt. Failed IUD removal by PCP. Inserted for period control in 2017. Uncertain if done w/ transition into menopause. C/o spotting-since iud removal attempt, pelvic pain, feels like getting \"poked\" when sitting    .    OBSTETRICS HISTORY:  OB History    Para Term  AB Living   3 2 2 0 1 2   SAB IAB Ectopic Multiple Live Births   0 0 0 0 2       GYNE HISTORY:  Periods spotting only    History   Sexual Activity    Sexual activity: Yes    Birth control/ protection: I.U.D., Tubal Ligation       MEDICAL HISTORY:  Past Medical History:    Anxiety    Zoloft    Asthma (HCC)    Benign tumor of pituitary gland (HCC)    Bulimia nervosa in remission (HCC)    Chest pain    hospitalize    Depression    mes--stopped on own    Diabetes (HCC)    GERD (gastroesophageal reflux disease)    Hypercholesteremia    IBS (irritable bowel syndrome)    Insomnia    Zoloft    MRSA (methicillin resistant Staphylococcus aureus)    Obesity (BMI 30-39.9)    MATILDE (obstructive sleep apnea)    PMDD (premenstrual dysphoric disorder)    Zoloft    Thyroid disease    Vertigo     Past Surgical History:   Procedure Laterality Date    Colonoscopy      Gosia    Egd  2022    Tubal ligation      'scope    Upper gi endoscopy,exam      EGD     OB History    Para Term  AB Living   3 2 2 0 1 2   SAB IAB Ectopic Multiple Live Births   0 0 0 0 2        SOCIAL HISTORY:  Social History     Socioeconomic History    Marital status: Single     Spouse name: Not on file    Number of children: Not on file    Years of education: Not on file    Highest education level: Not on file   Occupational History    Not on file   Tobacco Use    Smoking status: Never    Smokeless tobacco: Never   Vaping Use    Vaping status: Never Used   Substance and Sexual Activity     Alcohol use: Yes     Comment: socially     Drug use: No    Sexual activity: Yes     Birth control/protection: I.U.D., Tubal Ligation   Other Topics Concern     Service Not Asked    Blood Transfusions Not Asked    Caffeine Concern Yes     Comment: soda, tea, 8 oz daily    Occupational Exposure Not Asked    Hobby Hazards Not Asked    Sleep Concern Not Asked    Stress Concern Not Asked    Weight Concern Not Asked    Special Diet Not Asked    Back Care Not Asked    Exercise Not Asked    Bike Helmet Not Asked    Seat Belt Not Asked    Self-Exams Not Asked    Grew up on a farm Not Asked    History of tanning Not Asked    Outdoor occupation Not Asked    Pt has a pacemaker No    Pt has a defibrillator No    Breast feeding Not Asked    Reaction to local anesthetic No   Social History Narrative    Not on file     Social Determinants of Health     Financial Resource Strain: Not on file   Food Insecurity: Not on file   Transportation Needs: Not on file   Physical Activity: Not on file   Stress: Not on file   Social Connections: Not on file   Housing Stability: Not on file       MEDICATIONS:    Current Outpatient Medications:     venlafaxine ER (EFFEXOR XR) 150 MG Oral Capsule SR 24 Hr, Take 1 capsule (150 mg total) by mouth daily., Disp: 90 capsule, Rfl: 0    PHENTERMINE HCL 37.5 MG Oral Tab, TAKE 1 TABLET(37.5 MG) BY MOUTH EVERY MORNING BEFORE BREAKFAST, Disp: 90 tablet, Rfl: 0    MAGNESIUM OR, Take by mouth., Disp: , Rfl:     GARLIC OR, Take by mouth., Disp: , Rfl:     albuterol 108 (90 Base) MCG/ACT Inhalation Aero Soln, Inhale 2 puffs into the lungs every 6 (six) hours as needed for Wheezing or Shortness of Breath., Disp: 1 each, Rfl: 0    atorvastatin 20 MG Oral Tab, Take 1 tablet (20 mg total) by mouth nightly., Disp: 90 tablet, Rfl: 3    Tirzepatide (MOUNJARO) 10 MG/0.5ML Subcutaneous Solution Pen-injector, Inject 10 mg into the skin every 7 days., Disp: 2 mL, Rfl: 2    UNITHROID 88 MCG Oral Tab, Take 1 tablet  (88 mcg total) by mouth before breakfast., Disp: 90 tablet, Rfl: 1    Glucose Blood (CONTOUR NEXT TEST) In Vitro Strip, Check blood sugar once daily, Disp: 100 strip, Rfl: 3    Microlet Lancets Does not apply Misc, Use to test blood sugar once daily., Disp: 100 each, Rfl: 3    MIRENA, 52 MG, 20 MCG/24HR Intrauterine IUD, , Disp: , Rfl: 0    cholecalciferol 25 MCG (1000 UT) Oral Tab, Take 1 tablet (1,000 Units total) by mouth daily., Disp: , Rfl:     ALLERGIES:    Allergies   Allergen Reactions    Amoxicillin HIVES    Potassium HIVES    Vancomycin ITCHING         Review of Systems:  Constitutional:    denies fatigue, night sweats, hot flashes  Gastrointestinal:  denies abdominal pain, diarrhea or constipation  Genitourinary:    denies dysuria, abnormal vaginal discharge, vaginal itching  Musculoskeletal:   denies back pain.  Neurological:    denies headaches, extremity weakness or numbness.  Psychiatric:   denies depression or anxiety.        PHYSICAL EXAM:   BP 92/65   Pulse 94   Wt 191 lb (86.6 kg)   BMI 33.30 kg/m²   Constitutional:  well developed, well nourished  Head/Face: normocephalic  Psychiatric:   oriented to time, place, person and situation. Appropriate mood and affect    Assessment & Plan:    Evon was seen today for gyn problem.    Diagnoses and all orders for this visit:    Malpositioned intrauterine device (IUD), initial encounter  -     REMOVE INTRAUTERINE DEVICE [[id number]]    Encounter for removal of intrauterine contraceptive device  -     REMOVE INTRAUTERINE DEVICE [[id number]]      Reviewed chart in detail. Has not done blood testing to check hormonal status of perimenopause.  IUD inserted 2017 for period problem. Reviewed if not fully in menopause, periods will restart after Mirena IUD removal.  IUD removed succesfully -- follow up annual & discuss options of hot flash Rx.    Requested Prescriptions      No prescriptions requested or ordered in this encounter       Spent total time 20 minutes on  obtaining history / chart review, evaluating patient / performing medically appropriate exam, discussing treatment options, counseling / educating, and completing documentation, coordinating care.

## 2024-09-04 ENCOUNTER — OFFICE VISIT (OUTPATIENT)
Dept: ENDOCRINOLOGY CLINIC | Facility: CLINIC | Age: 49
End: 2024-09-04
Payer: COMMERCIAL

## 2024-09-04 VITALS
BODY MASS INDEX: 33.95 KG/M2 | DIASTOLIC BLOOD PRESSURE: 83 MMHG | HEART RATE: 77 BPM | SYSTOLIC BLOOD PRESSURE: 123 MMHG | HEIGHT: 63.5 IN | WEIGHT: 194 LBS

## 2024-09-04 DIAGNOSIS — I10 HYPERTENSION, UNSPECIFIED TYPE: ICD-10-CM

## 2024-09-04 DIAGNOSIS — E78.00 HYPERCHOLESTEREMIA: ICD-10-CM

## 2024-09-04 DIAGNOSIS — E66.09 CLASS 1 OBESITY DUE TO EXCESS CALORIES WITH SERIOUS COMORBIDITY AND BODY MASS INDEX (BMI) OF 33.0 TO 33.9 IN ADULT: Primary | ICD-10-CM

## 2024-09-04 DIAGNOSIS — E55.9 VITAMIN D DEFICIENCY: ICD-10-CM

## 2024-09-04 DIAGNOSIS — D35.2 PITUITARY MICROADENOMA (HCC): ICD-10-CM

## 2024-09-04 DIAGNOSIS — E03.9 HYPOTHYROIDISM, UNSPECIFIED TYPE: ICD-10-CM

## 2024-09-04 DIAGNOSIS — E11.65 TYPE 2 DIABETES MELLITUS WITH HYPERGLYCEMIA, WITHOUT LONG-TERM CURRENT USE OF INSULIN (HCC): ICD-10-CM

## 2024-09-04 LAB
GLUCOSE BLOOD: 109
HEMOGLOBIN A1C: 5.2 % (ref 4.3–5.6)
TEST STRIP LOT #: NORMAL NUMERIC

## 2024-09-04 PROCEDURE — 83036 HEMOGLOBIN GLYCOSYLATED A1C: CPT | Performed by: INTERNAL MEDICINE

## 2024-09-04 PROCEDURE — 3079F DIAST BP 80-89 MM HG: CPT | Performed by: INTERNAL MEDICINE

## 2024-09-04 PROCEDURE — 82947 ASSAY GLUCOSE BLOOD QUANT: CPT | Performed by: INTERNAL MEDICINE

## 2024-09-04 PROCEDURE — 99214 OFFICE O/P EST MOD 30 MIN: CPT | Performed by: INTERNAL MEDICINE

## 2024-09-04 PROCEDURE — 3044F HG A1C LEVEL LT 7.0%: CPT | Performed by: INTERNAL MEDICINE

## 2024-09-04 PROCEDURE — 3008F BODY MASS INDEX DOCD: CPT | Performed by: INTERNAL MEDICINE

## 2024-09-04 PROCEDURE — 3074F SYST BP LT 130 MM HG: CPT | Performed by: INTERNAL MEDICINE

## 2024-09-04 RX ORDER — TIRZEPATIDE 10 MG/.5ML
10 INJECTION, SOLUTION SUBCUTANEOUS WEEKLY
Qty: 6 ML | Refills: 3 | Status: SHIPPED | OUTPATIENT
Start: 2024-09-04 | End: 2024-12-03

## 2024-09-04 NOTE — PROGRESS NOTES
Name: Evon Liriano  Date: 9/04/24    Referring Physician: No ref. provider found    HISTORY OF PRESENT ILLNESS   Evon Liriano is a 48 year old female who presents for evaluation and management of type 2 diabetes. She was diagnosed with diabetes prior to her seeing me, and I had started her on Mounjaro initially. She was doing well on this. However, her insurance stopped covering this and I had to switch her to Trulicity. She is now back on the Mounjaro.    She also has a history of a pituitary microadenoma that was diagnosed in 2015 when she was having irregular menstrual cycles and elevated prolactin was discovered. She has had a repeat pituitary MRI, which shows that the microadenoma has decreased in size. She has an IUD now and does think that she is close to menopausal.     Blood Glucose Today: 103  HbA1C or glycohemoglobin is 5.2 today (and it was 5.5 on 6/05/24 and it was 6.2 on 3/06/24 and it was 5.8 on 10/11/23 and it was 5.8 on 6/22/23 and it was 6.7 on 11/01/22)  Type 1 or Type 2?: Type 2  Medications for DM: Mounjaro 10mg qweekly  Checking a few times per month  Fasting- below 100  2 hours after eating- 2 hours after eating- 108  Episodes of hypoglycemia: occasionally when she does not eat for many hours, she feels very cold and if she checks blood sugars, they are in the 60s.     Dietary compliance: she is eating healthy    Exercise: she is trying to exercise    Polyuria/polydipsia: none    Blurred vision: none    Flu Vaccine This Season: yes    Covid Vaccine: yes    REVIEW OF SYSTEMS  CV: Cardiovascular disease present: none         Hypertension present: yes, at goal, not on meds         Hyperlipidemia present: not at goal, on meds (6/04/24)         Peripheral Vascular Disease present: none    : Nephropathy present: MAC: none (6/04/24) Creatinine: 1.00     Neuro: Neuropathy present: none    Eyes: Diabetic retinopathy present: no            Most recent visit to eye doctor in last 12  months: 2/28/23    Skin: Infection or ulceration: none    Osteoporosis/ Osteopenia: severe osteopenia Vitamin D: 26.7 (6/04/24)    Thyroid disease: yes  TSH: 0.240 (6/04/24); LT4 100mcg     Medications:     Current Outpatient Medications:     atorvastatin 20 MG Oral Tab, Take 1 tablet (20 mg total) by mouth nightly., Disp: 90 tablet, Rfl: 3    Tirzepatide (MOUNJARO) 10 MG/0.5ML Subcutaneous Solution Pen-injector, Inject 10 mg into the skin every 7 days., Disp: 2 mL, Rfl: 2    venlafaxine ER (EFFEXOR XR) 150 MG Oral Capsule SR 24 Hr, Take 1 capsule (150 mg total) by mouth daily., Disp: 90 capsule, Rfl: 0    PHENTERMINE HCL 37.5 MG Oral Tab, TAKE 1 TABLET(37.5 MG) BY MOUTH EVERY MORNING BEFORE BREAKFAST, Disp: 90 tablet, Rfl: 0    MAGNESIUM OR, Take by mouth., Disp: , Rfl:     GARLIC OR, Take by mouth., Disp: , Rfl:     albuterol 108 (90 Base) MCG/ACT Inhalation Aero Soln, Inhale 2 puffs into the lungs every 6 (six) hours as needed for Wheezing or Shortness of Breath., Disp: 1 each, Rfl: 0    UNITHROID 88 MCG Oral Tab, Take 1 tablet (88 mcg total) by mouth before breakfast., Disp: 90 tablet, Rfl: 1    cholecalciferol 25 MCG (1000 UT) Oral Tab, Take 1 tablet (1,000 Units total) by mouth daily., Disp: , Rfl:     Glucose Blood (CONTOUR NEXT TEST) In Vitro Strip, Check blood sugar once daily, Disp: 100 strip, Rfl: 3    Microlet Lancets Does not apply Misc, Use to test blood sugar once daily., Disp: 100 each, Rfl: 3    MIRENA, 52 MG, 20 MCG/24HR Intrauterine IUD, , Disp: , Rfl: 0     Allergies:   Allergies   Allergen Reactions    Amoxicillin HIVES    Potassium HIVES    Vancomycin ITCHING       Social History:   Social History     Socioeconomic History    Marital status: Single   Tobacco Use    Smoking status: Never    Smokeless tobacco: Never   Vaping Use    Vaping status: Never Used   Substance and Sexual Activity    Alcohol use: Yes     Comment: socially     Drug use: No    Sexual activity: Yes     Birth  control/protection: I.U.D., Tubal Ligation   Other Topics Concern    Caffeine Concern Yes     Comment: soda, tea, 8 oz daily    Pt has a pacemaker No    Pt has a defibrillator No    Reaction to local anesthetic No       Medical History:   Past Medical History:    Anxiety    Zoloft    Asthma (HCC)    Benign tumor of pituitary gland (HCC)    Bulimia nervosa in remission (HCC)    Chest pain    hospitalize    Depression    mes--stopped on own    Diabetes (HCC)    GERD (gastroesophageal reflux disease)    Hypercholesteremia    IBS (irritable bowel syndrome)    Insomnia    Zoloft    MRSA (methicillin resistant Staphylococcus aureus)    Obesity (BMI 30-39.9)    MATILDE (obstructive sleep apnea)    PMDD (premenstrual dysphoric disorder)    Zoloft    Thyroid disease    Vertigo       Surgical history:   Past Surgical History:   Procedure Laterality Date    Colonoscopy  2017    Gosia    Egd  06/29/2022    Tubal ligation  2005    'scope    Upper gi endoscopy,exam  2011    EGD       PHYSICAL EXAM  Vitals:    09/04/24 1715   BP: 123/83   Pulse: 77   Weight: 194 lb (88 kg)   Height: 5' 3.5\" (1.613 m)         General Appearance:  alert, well developed, in no acute distress  Eyes:  normal conjunctivae, sclera., normal sclera and normal pupils  Neuro:  sensory grossly intact and motor grossly intact, normal monofilament exam  Psychiatric:  oriented to time, self, and place  Nutritional:  no abnormal weight gain or loss  Bilateral barefoot skin diabetic exam is normal, visualized feet and the appearance is normal.  Bilateral monofilament/sensation of both feet is normal.  Pulsation pedal pulse exam of both lower legs/feet is normal as well.        Lab Data:   Lab Results   Component Value Date     06/22/2023    A1C 5.5 06/05/2024     Lab Results   Component Value Date    GLU 90 06/04/2024    BUN 16 06/04/2024    BUNCREA 16.0 06/04/2024    CREATSERUM 1.00 06/04/2024    ANIONGAP 7 06/04/2024    GFRNAA 77 06/20/2022    GFRAA 89  06/20/2022    CA 9.9 06/04/2024    OSMOCALC 289 06/04/2024    ALKPHO 211 (H) 06/04/2024    AST 38 (H) 06/04/2024    ALT 73 (H) 06/04/2024    ALKPHOS 72 03/11/2014    BILT 0.5 06/04/2024    TP 7.9 06/04/2024    ALB 4.5 06/04/2024    GLOBULIN 3.4 06/04/2024    AGRATIO 1.2 03/11/2014     06/04/2024    K 4.6 06/04/2024     06/04/2024    CO2 27.0 06/04/2024     Lab Results   Component Value Date    CHOLEST 187 06/04/2024    TRIG 128 06/04/2024    HDL 44 06/04/2024     (H) 06/04/2024    VLDL 22 06/04/2024    NONHDLC 143 (H) 06/04/2024    CALCNONHDL 183 (H) 08/26/2013     Lab Results   Component Value Date    MALBP 2.90 06/04/2024    CREUR 245.90 06/04/2024       ASSESSMENT/PLAN:  This is a 48 year-old woman here for evaluation and management of type 2 diabetes. We discussed the ABCs of DM.     1.) Hyperglycemia Management- We discussed the importance of glycemic control to prevent complications of diabetes. We discussed the importance of SBGM. I offered and provided patient education materials and offered a blood glucose log book.   - Continue Mounjaro 10mg qweekly  - Check blood sugars fasting and two hours after biggest meal    2.) Management of Diabetic Complications- We discussed the complications of diabetes include retinopathy, neuropathy, nephropathy and cardiovascular disease.   - Ophtho- up to date  - Flu and Covid vaccine- up to date  - BP- at goal, on meds  - Lipids- check now, continue 20mg  - MAC- check now  - CMP- check now, LFTs elevated, if persists, will check liver USG  - Neuropathy- none  - CAD- none    3.) Lifestyle Management for Diabetes- We discussed importance of a low CHO diet, and recommend 45gm per meal or 135gm per day. We discussed the importance of trying to follow a Mediterranean diet, with an emphasis on vegetables at every meal, with lots whole grains, and protein from either plant-based sources, or poultry and fish.   - Diet- I have given her the 'the diabetes plate', I  am asking her to try and eat more complex carbs so that she does not have low blood sugars   - Exercise- she will try to increase this     4.) Hypothyroidism- I decreased her Unithroid dose from 100mcg to 88mcg and recheck now    5.) History of Pituitary Microadenoma with mildly elevated prolactin  - Repeat MRI shows that that pituitary adenoma has decreased in size, check in 7/2025  - Repeat pituitary labs completed and within normal limits- check in 7/2025    6.) Osteopenia- check DEXA scan at next visit- normal BMD    7.) Weight loss- Continue 37.5mg of phentermine.     8.) Vitamin D deficiency- started cholecalciferol 2,000 international units and check vitamin D now    Return to clinic in 3 months    Prior to this encounter, I spent over 15 minutes with preparing for the visit, including reviewing documents from other specialties as well as from PCP and going over test results. During the face to face encounter, I spent an additional 15 minutes which were determined for follow-up. Greater than 50% of the time was spent in counseling, anticipatory guidance, and coordination of care. Patient concerns were answered to the best of my knowledge.     9/04/24  Hanna Jean MD

## 2024-09-27 ENCOUNTER — TELEPHONE (OUTPATIENT)
Dept: FAMILY MEDICINE CLINIC | Facility: CLINIC | Age: 49
End: 2024-09-27

## 2024-09-27 NOTE — TELEPHONE ENCOUNTER
Dr Bella, Please see informational message below.    [facility] calling,verified  patient name and date of birth.    [facility] office calling to advise that patient did not attend her diabetic eye exam yesterday and did not reschedule.

## 2024-10-08 NOTE — TELEPHONE ENCOUNTER
Endocrine Refill protocol for oral and injectable diabetic medications    Protocol Criteria:  PASSED  Reason: N/A    If all below requirements are met, send a 90-day supply with 1 refill per provider protocol.    Verify appointment with Endocrinology completed in the last 6 months or scheduled in the next 3 months.  Verify A1C has been completed within the last 6 months and is below 8.5%     Last completed office visit: 9/4/2024 Hanna Jean MD   Next scheduled Follow up:   Future Appointments   Date Time Provider Department Center   3/5/2025  5:00 PM Hanna Jean MD [facility]      Last A1c result: Last A1c value was 5.2% done 9/4/2024.

## 2024-10-10 RX ORDER — TIRZEPATIDE 10 MG/.5ML
10 INJECTION, SOLUTION SUBCUTANEOUS WEEKLY
Qty: 8 ML | Refills: 0 | Status: SHIPPED | OUTPATIENT
Start: 2024-10-10

## 2024-10-13 DIAGNOSIS — E03.9 HYPOTHYROIDISM, UNSPECIFIED TYPE: ICD-10-CM

## 2024-10-14 RX ORDER — LEVOTHYROXINE SODIUM 88 UG/1
88 TABLET ORAL
Qty: 90 TABLET | Refills: 0 | Status: SHIPPED | OUTPATIENT
Start: 2024-10-14 | End: 2024-10-15

## 2024-10-14 NOTE — TELEPHONE ENCOUNTER
Endocrine refill protocol for medications for hypothyroidism and hyperthyroidism    Protocol Criteria:  FAILED Reason: Abnormal labs    If all below requirements are met, send a 90-day supply with 1 refill per provider protocol.    Verify appointment with Endocrinology completed in the last 12 months or scheduled in the next 6 months.    Normal TSH result in the past 12 months   Review recent telephone encounters and mychart communications with patient to ensure a dose change has not occurred since last office visit that was not updated in the medication history list     Last completed office visit:9/4/2024 Hanna Jean MD   Next scheduled Follow up:   Future Appointments   Date Time Provider Department Center   11/2/2024 10:20 AM Kristy Spangler MD [facility]   3/5/2025  5:00 PM Hanna Jean MD [facility]      Last TSH result:   TSH   Date Value Ref Range Status   06/04/2024 0.240 (L) 0.550 - 4.780 mIU/mL Final     TSH (S)   Date Value Ref Range Status   11/24/2014 0.22 (L) 0.34 - 5.60 uIU/mL Final

## 2024-10-15 DIAGNOSIS — E03.9 HYPOTHYROIDISM, UNSPECIFIED TYPE: ICD-10-CM

## 2024-10-15 NOTE — TELEPHONE ENCOUNTER
Endocrine refill protocol for medications for hypothyroidism and hyperthyroidism    Protocol Criteria:  FAILED Reason: Abnormal labs    If all below requirements are met, send a 90-day supply with 1 refill per provider protocol.    Verify appointment with Endocrinology completed in the last 12 months or scheduled in the next 6 months.    Normal TSH result in the past 12 months   Review recent telephone encounters and mychart communications with patient to ensure a dose change has not occurred since last office visit that was not updated in the medication history list     Last completed office visit:9/4/2024 Hanna Jean MD   Next scheduled Follow up:   Future Appointments   Date Time Provider Department Center   3/5/2025  5:00 PM Hanna Jean MD [facility] [place]      Last TSH result:   TSH   Date Value Ref Range Status   06/04/2024 0.240 (L) 0.550 - 4.780 mIU/mL Final     TSH (S)   Date Value Ref Range Status   11/24/2014 0.22 (L) 0.34 - 5.60 uIU/mL Final

## 2024-10-16 ENCOUNTER — TELEPHONE (OUTPATIENT)
Dept: OBGYN CLINIC | Facility: CLINIC | Age: 49
End: 2024-10-16

## 2024-10-16 RX ORDER — LEVOTHYROXINE SODIUM 88 UG/1
88 TABLET ORAL
Qty: 90 TABLET | Refills: 0 | Status: SHIPPED | OUTPATIENT
Start: 2024-10-16

## 2024-10-16 NOTE — TELEPHONE ENCOUNTER
Per 8/29 office visit, Dr. Spangler told patient to keep original 11/2 appointment for New patient and have it changed to annual as IUD was removed on 8/29. Appointment type has been changed.

## 2024-11-01 ENCOUNTER — LAB ENCOUNTER (OUTPATIENT)
Dept: LAB | Age: 49
End: 2024-11-01
Attending: FAMILY MEDICINE
Payer: COMMERCIAL

## 2024-11-01 DIAGNOSIS — E11.65 TYPE 2 DIABETES MELLITUS WITH HYPERGLYCEMIA, WITHOUT LONG-TERM CURRENT USE OF INSULIN (HCC): ICD-10-CM

## 2024-11-01 DIAGNOSIS — E03.9 HYPOTHYROIDISM, UNSPECIFIED TYPE: ICD-10-CM

## 2024-11-01 DIAGNOSIS — E78.5 DYSLIPIDEMIA: ICD-10-CM

## 2024-11-01 DIAGNOSIS — E55.9 VITAMIN D DEFICIENCY: ICD-10-CM

## 2024-11-01 DIAGNOSIS — L74.9 SWEATING ABNORMALITY: ICD-10-CM

## 2024-11-01 DIAGNOSIS — E66.812 CLASS 2 SEVERE OBESITY DUE TO EXCESS CALORIES WITH SERIOUS COMORBIDITY AND BODY MASS INDEX (BMI) OF 37.0 TO 37.9 IN ADULT (HCC): ICD-10-CM

## 2024-11-01 DIAGNOSIS — M85.80 OSTEOPENIA, UNSPECIFIED LOCATION: ICD-10-CM

## 2024-11-01 DIAGNOSIS — E66.01 CLASS 2 SEVERE OBESITY DUE TO EXCESS CALORIES WITH SERIOUS COMORBIDITY AND BODY MASS INDEX (BMI) OF 37.0 TO 37.9 IN ADULT (HCC): ICD-10-CM

## 2024-11-01 DIAGNOSIS — Z00.00 WELL ADULT EXAM: ICD-10-CM

## 2024-11-01 LAB
ALBUMIN SERPL-MCNC: 5 G/DL (ref 3.2–4.8)
ALBUMIN/GLOB SERPL: 1.7 {RATIO} (ref 1–2)
ALP LIVER SERPL-CCNC: 186 U/L
ALT SERPL-CCNC: 33 U/L
ANION GAP SERPL CALC-SCNC: 8 MMOL/L (ref 0–18)
AST SERPL-CCNC: 23 U/L (ref ?–34)
BASOPHILS # BLD AUTO: 0.05 X10(3) UL (ref 0–0.2)
BASOPHILS NFR BLD AUTO: 0.7 %
BILIRUB SERPL-MCNC: 0.5 MG/DL (ref 0.3–1.2)
BUN BLD-MCNC: 12 MG/DL (ref 9–23)
BUN/CREAT SERPL: 12.9 (ref 10–20)
CALCIUM BLD-MCNC: 10 MG/DL (ref 8.7–10.4)
CHLORIDE SERPL-SCNC: 104 MMOL/L (ref 98–112)
CHOLEST SERPL-MCNC: 151 MG/DL (ref ?–200)
CO2 SERPL-SCNC: 28 MMOL/L (ref 21–32)
CREAT BLD-MCNC: 0.93 MG/DL
CREAT UR-SCNC: 240.5 MG/DL
DEPRECATED RDW RBC AUTO: 42.2 FL (ref 35.1–46.3)
EGFRCR SERPLBLD CKD-EPI 2021: 75 ML/MIN/1.73M2 (ref 60–?)
EOSINOPHIL # BLD AUTO: 0.09 X10(3) UL (ref 0–0.7)
EOSINOPHIL NFR BLD AUTO: 1.2 %
ERYTHROCYTE [DISTWIDTH] IN BLOOD BY AUTOMATED COUNT: 12.9 % (ref 11–15)
ESTRADIOL SERPL-MCNC: 35.1 PG/ML
FASTING PATIENT LIPID ANSWER: YES
FASTING STATUS PATIENT QL REPORTED: YES
FSH SERPL-ACNC: 54.4 MIU/ML
GLOBULIN PLAS-MCNC: 3 G/DL (ref 2–3.5)
GLUCOSE BLD-MCNC: 84 MG/DL (ref 70–99)
HCT VFR BLD AUTO: 43.6 %
HDLC SERPL-MCNC: 55 MG/DL (ref 40–59)
HGB BLD-MCNC: 15 G/DL
IMM GRANULOCYTES # BLD AUTO: 0.02 X10(3) UL (ref 0–1)
IMM GRANULOCYTES NFR BLD: 0.3 %
LDLC SERPL CALC-MCNC: 79 MG/DL (ref ?–100)
LH SERPL-ACNC: 73.4 MIU/ML
LYMPHOCYTES # BLD AUTO: 2.14 X10(3) UL (ref 1–4)
LYMPHOCYTES NFR BLD AUTO: 28 %
MCH RBC QN AUTO: 30.7 PG (ref 26–34)
MCHC RBC AUTO-ENTMCNC: 34.4 G/DL (ref 31–37)
MCV RBC AUTO: 89.2 FL
MICROALBUMIN UR-MCNC: 1.1 MG/DL
MICROALBUMIN/CREAT 24H UR-RTO: 4.6 UG/MG (ref ?–30)
MONOCYTES # BLD AUTO: 0.51 X10(3) UL (ref 0.1–1)
MONOCYTES NFR BLD AUTO: 6.7 %
NEUTROPHILS # BLD AUTO: 4.82 X10 (3) UL (ref 1.5–7.7)
NEUTROPHILS # BLD AUTO: 4.82 X10(3) UL (ref 1.5–7.7)
NEUTROPHILS NFR BLD AUTO: 63.1 %
NONHDLC SERPL-MCNC: 96 MG/DL (ref ?–130)
OSMOLALITY SERPL CALC.SUM OF ELEC: 289 MOSM/KG (ref 275–295)
PLATELET # BLD AUTO: 414 10(3)UL (ref 150–450)
POTASSIUM SERPL-SCNC: 4.1 MMOL/L (ref 3.5–5.1)
PROT SERPL-MCNC: 8 G/DL (ref 5.7–8.2)
RBC # BLD AUTO: 4.89 X10(6)UL
SODIUM SERPL-SCNC: 140 MMOL/L (ref 136–145)
T4 FREE SERPL-MCNC: 1.2 NG/DL (ref 0.8–1.7)
TRIGL SERPL-MCNC: 89 MG/DL (ref 30–149)
TSI SER-ACNC: 0.35 UIU/ML (ref 0.55–4.78)
VIT D+METAB SERPL-MCNC: 32.5 NG/ML (ref 30–100)
VLDLC SERPL CALC-MCNC: 14 MG/DL (ref 0–30)
WBC # BLD AUTO: 7.6 X10(3) UL (ref 4–11)

## 2024-11-01 PROCEDURE — 82670 ASSAY OF TOTAL ESTRADIOL: CPT

## 2024-11-01 PROCEDURE — 84439 ASSAY OF FREE THYROXINE: CPT

## 2024-11-01 PROCEDURE — 82043 UR ALBUMIN QUANTITATIVE: CPT

## 2024-11-01 PROCEDURE — 82306 VITAMIN D 25 HYDROXY: CPT

## 2024-11-01 PROCEDURE — 83002 ASSAY OF GONADOTROPIN (LH): CPT

## 2024-11-01 PROCEDURE — 83001 ASSAY OF GONADOTROPIN (FSH): CPT

## 2024-11-01 PROCEDURE — 85025 COMPLETE CBC W/AUTO DIFF WBC: CPT

## 2024-11-01 PROCEDURE — 80061 LIPID PANEL: CPT

## 2024-11-01 PROCEDURE — 36415 COLL VENOUS BLD VENIPUNCTURE: CPT

## 2024-11-01 PROCEDURE — 84443 ASSAY THYROID STIM HORMONE: CPT

## 2024-11-01 PROCEDURE — 80053 COMPREHEN METABOLIC PANEL: CPT

## 2024-11-01 PROCEDURE — 82570 ASSAY OF URINE CREATININE: CPT

## 2024-11-02 ENCOUNTER — OFFICE VISIT (OUTPATIENT)
Dept: OBGYN CLINIC | Facility: CLINIC | Age: 49
End: 2024-11-02
Payer: COMMERCIAL

## 2024-11-02 ENCOUNTER — TELEPHONE (OUTPATIENT)
Dept: PEDIATRICS CLINIC | Facility: CLINIC | Age: 49
End: 2024-11-02

## 2024-11-02 VITALS
SYSTOLIC BLOOD PRESSURE: 101 MMHG | BODY MASS INDEX: 32.78 KG/M2 | DIASTOLIC BLOOD PRESSURE: 68 MMHG | HEIGHT: 63 IN | HEART RATE: 102 BPM | WEIGHT: 185 LBS

## 2024-11-02 DIAGNOSIS — N95.1 PERIMENOPAUSE: ICD-10-CM

## 2024-11-02 DIAGNOSIS — Z01.419 ENCOUNTER FOR GYNECOLOGICAL EXAMINATION WITHOUT ABNORMAL FINDING: Primary | ICD-10-CM

## 2024-11-02 PROCEDURE — 3074F SYST BP LT 130 MM HG: CPT | Performed by: OBSTETRICS & GYNECOLOGY

## 2024-11-02 PROCEDURE — 3078F DIAST BP <80 MM HG: CPT | Performed by: OBSTETRICS & GYNECOLOGY

## 2024-11-02 PROCEDURE — 3008F BODY MASS INDEX DOCD: CPT | Performed by: OBSTETRICS & GYNECOLOGY

## 2024-11-02 PROCEDURE — 99396 PREV VISIT EST AGE 40-64: CPT | Performed by: OBSTETRICS & GYNECOLOGY

## 2024-11-02 NOTE — TELEPHONE ENCOUNTER
Annual exam 11/2/24 with Dr. Spangler.  Patient was advised by Dr. Spangler to follow up with her in 6 weeks.  To Dr. Spangler to please advise what type of f/u is needed for patient, thank you.

## 2024-11-02 NOTE — TELEPHONE ENCOUNTER
Per [facility] patient needs to follow up in 6 weeks. Pt needs evening appt. Advised she will receive call to schedule appt.

## 2024-11-05 NOTE — PROGRESS NOTES
Evon Liriano is a 49 year old female  No LMP recorded. (Menstrual status: Other).   Chief Complaint   Patient presents with    Gyn Exam     ANNUAL EXAM    Other     Had IUD removed  --  no periods since. (+) hot flashes. Had labs done by PCP to evaluate menopause   .    OBSTETRICS HISTORY:     OB History    Para Term  AB Living   3 2 2 0 1 2   SAB IAB Ectopic Multiple Live Births   0 0 0 0 2      # Outcome Date GA Lbr Rodri/2nd Weight Sex Type Anes PTL Lv   3 Term 04   7 lb 9 oz (3.43 kg) M NORMAL SPONT   GLADIS   2 Term 93   6 lb 10 oz (3.005 kg) M NORMAL SPONT   GLADIS   1 AB                GYNE HISTORY:     Periods absent      BCM:  Tubal Ligation    History   Sexual Activity    Sexual activity: Yes    Birth control/ protection: I.U.D., Tubal Ligation        Hx Prior Abnormal Pap: Yes  Pap Date: 23  Pap Result Notes: Pap neg, HPV Neg  Follow Up Recommendation: MAMMO BILATERAL 24 NEG       DEXA SCAN 24 NORMAL          Latest Ref Rng & Units 2023    11:22 AM 2018     7:16 PM   RECENT PAP RESULTS   INTERPRETATION/RESULT: Negative for intraepithelial lesion or malignancy Negative for intraepithelial lesion or malignancy  Negative for intraepithelial lesion or malignancy    HPV Negative Negative  Negative          MEDICAL HISTORY:     Past Medical History:    Anxiety    Zoloft    Asthma (HCC)    Benign tumor of pituitary gland (HCC)    Bulimia nervosa in remission (HCC)    Chest pain    hospitalize    Depression    mes--stopped on own    Diabetes (HCC)    GERD (gastroesophageal reflux disease)    Hypercholesteremia    IBS (irritable bowel syndrome)    Insomnia    Zoloft    MRSA (methicillin resistant Staphylococcus aureus)    Obesity (BMI 30-39.9)    MATILDE (obstructive sleep apnea)    PMDD (premenstrual dysphoric disorder)    Zoloft    Thyroid disease    Vertigo     Past Surgical History:   Procedure Laterality Date    Colonoscopy      Gosia    Egd   2022    Tubal ligation  2005    'scope    Upper gi endoscopy,exam  2011    EGD     OB History    Para Term  AB Living   3 2 2 0 1 2   SAB IAB Ectopic Multiple Live Births   0 0 0 0 2        SOCIAL HISTORY:     Tobacco Use: Low Risk  (2024)    Patient History     Smoking Tobacco Use: Never     Smokeless Tobacco Use: Never     Passive Exposure: Not on file       FAMILY HISTORY:     Family History   Problem Relation Age of Onset    Hypertension Father     Hypertension Mother     Heart Disease Maternal Grandmother     Other (lymphoma) Maternal Grandmother     Other (lung cancer) Maternal Grandfather     Diabetes Paternal Grandmother     Breast Cancer Other         mat great aunt age 50s    Glaucoma Neg     Macular degeneration Neg          MEDICATIONS:       Current Outpatient Medications:     UNITHROID 88 MCG Oral Tab, Take 1 tablet (88 mcg total) by mouth before breakfast., Disp: 90 tablet, Rfl: 0    MOUNJARO 10 MG/0.5ML Subcutaneous Solution Pen-injector, INJECT 10 MG UNDER THE SKIN ONCE A WEEK, Disp: 8 mL, Rfl: 0    Tirzepatide (MOUNJARO) 10 MG/0.5ML Subcutaneous Solution Pen-injector, Inject 10 mg into the skin once a week., Disp: 6 mL, Rfl: 3    venlafaxine ER (EFFEXOR XR) 150 MG Oral Capsule SR 24 Hr, Take 1 capsule (150 mg total) by mouth daily., Disp: 90 capsule, Rfl: 0    PHENTERMINE HCL 37.5 MG Oral Tab, TAKE 1 TABLET(37.5 MG) BY MOUTH EVERY MORNING BEFORE BREAKFAST, Disp: 90 tablet, Rfl: 0    MAGNESIUM OR, Take by mouth., Disp: , Rfl:     GARLIC OR, Take by mouth., Disp: , Rfl:     albuterol 108 (90 Base) MCG/ACT Inhalation Aero Soln, Inhale 2 puffs into the lungs every 6 (six) hours as needed for Wheezing or Shortness of Breath., Disp: 1 each, Rfl: 0    atorvastatin 20 MG Oral Tab, Take 1 tablet (20 mg total) by mouth nightly., Disp: 90 tablet, Rfl: 3    cholecalciferol 25 MCG (1000 UT) Oral Tab, Take 1 tablet (1,000 Units total) by mouth daily., Disp: , Rfl:     Glucose Blood  (CONTOUR NEXT TEST) In Vitro Strip, Check blood sugar once daily, Disp: 100 strip, Rfl: 3    Microlet Lancets Does not apply Misc, Use to test blood sugar once daily., Disp: 100 each, Rfl: 3    MIRENA, 52 MG, 20 MCG/24HR Intrauterine IUD, , Disp: , Rfl: 0    ALLERGIES:     Allergies[1]      REVIEW OF SYSTEMS:     Constitutional:    denies fever / chills  Eyes:     denies blurred or double vision  Cardiovascular:  denies chest pain or palpitations  Respiratory:    denies shortness of breath  Gastrointestinal:  denies severe abdominal pain, frequent diarrhea or constipation, nausea / vomiting  Genitourinary:    denies dysuria, bothersome incontinence  Skin/Breast:   denies any breast pain, lumps, or discharge  Neurological:    denies frequent severe headaches  Psychiatric:   denies depression or anxiety, thoughts of harming self or others  Heme/Lymph:    denies easy bruising or bleeding      PHYSICAL EXAM:   Blood pressure 101/68, pulse 102, height 5' 3\" (1.6 m), weight 185 lb (83.9 kg), not currently breastfeeding.  Constitutional:  well developed, well nourished  Head/Face:  normocephalic  Neck/Thyroid: thyroid symmetric, no thyromegaly, no nodules, no adenopathy  Lymphatic: no abnormal supraclavicular or axillary adenopathy is noted  Breast:   normal without palpable masses, tenderness, asymmetry, nipple discharge, nipple retraction or skin changes  Abdomen:   soft, nontender, nondistended, no masses  Skin/Hair:  no unusual rashes or bruises  Extremities:  no edema, no cyanosis  Psychiatric:   oriented to time, place, person and situation. Appropriate mood and affect    Pelvic Exam:  External Genitalia:  normal appearance, hair distribution, and no lesions  Urethral Meatus:   normal in size, location, without lesions and prolapse  Bladder:    no fullness, masses or tenderness  Vagina:    normal appearance without lesions, no abnormal discharge  Cervix:    normal without tenderness on motion  Uterus:    normal in  size, contour, position, mobility, without tenderness  Adnexa:   normal without masses or tenderness  Perineum:   normal  Anus: no hemorroids         ASSESSMENT & PLAN:     Evon was seen today for gyn exam and other.    Diagnoses and all orders for this visit:    Encounter for gynecological examination without abnormal finding    Perimenopause    Monitor periods. If no spontaneous period x 12 months since IUD removed, then officially menopausal. Reviewed options of Rx including black cohosh / soy / effexor / veozah.  Per pt PCP placed her on 175 mg effexor & had vomitting. No gradual increase. Informed for hot flashes, would start at 37.5 mg for one week then only 75 mg. She may tolerate better. Wishes to try OTC meds first.    SUMMARY:  Pap: Next cotest 6/26-28 per ASCCP guidelines.  BCM:  Tubal Ligation  STD screening: declines  Mammogram: done recently  HM updated  Depression screen:   Depression Screening (PHQ-2/PHQ-9): Over the LAST 2 WEEKS   Little interest or pleasure in doing things: Several days    Feeling down, depressed, or hopeless: Not at all    PHQ-2 SCORE: 1          FOLLOW-UP     Return in about 1 year (around 11/2/2025) for annual gyne exam.    Note to patient and family:  The 21st Century Cures Act makes medical notes available to patients in the interest of transparency.  However, please be advised that this is a medical document.  It is intended as a peer to peer communication.  It is written in medical language and may contain abbreviations or verbiage that are technical and unfamiliar.  It may appear blunt or direct.  Medical documents are intended to carry relevant information, facts as evident, and the clinical opinion of the practitioner.         [1]   Allergies  Allergen Reactions    Amoxicillin HIVES    Potassium HIVES    Vancomycin ITCHING

## 2024-11-07 ENCOUNTER — OFFICE VISIT (OUTPATIENT)
Dept: FAMILY MEDICINE CLINIC | Facility: CLINIC | Age: 49
End: 2024-11-07
Payer: COMMERCIAL

## 2024-11-07 VITALS
SYSTOLIC BLOOD PRESSURE: 106 MMHG | DIASTOLIC BLOOD PRESSURE: 75 MMHG | WEIGHT: 184 LBS | BODY MASS INDEX: 33 KG/M2 | OXYGEN SATURATION: 97 % | HEART RATE: 96 BPM

## 2024-11-07 DIAGNOSIS — J45.998 SEASONAL ASTHMA (HCC): ICD-10-CM

## 2024-11-07 DIAGNOSIS — J20.9 ACUTE BRONCHITIS, UNSPECIFIED ORGANISM: Primary | ICD-10-CM

## 2024-11-07 PROCEDURE — 99213 OFFICE O/P EST LOW 20 MIN: CPT | Performed by: FAMILY MEDICINE

## 2024-11-07 PROCEDURE — 3061F NEG MICROALBUMINURIA REV: CPT | Performed by: FAMILY MEDICINE

## 2024-11-07 PROCEDURE — 3078F DIAST BP <80 MM HG: CPT | Performed by: FAMILY MEDICINE

## 2024-11-07 PROCEDURE — 3074F SYST BP LT 130 MM HG: CPT | Performed by: FAMILY MEDICINE

## 2024-11-07 RX ORDER — PREDNISONE 10 MG/1
TABLET ORAL
Qty: 18 TABLET | Refills: 0 | Status: SHIPPED | OUTPATIENT
Start: 2024-11-07

## 2024-11-07 RX ORDER — ALBUTEROL SULFATE 90 UG/1
2 INHALANT RESPIRATORY (INHALATION) EVERY 6 HOURS PRN
Qty: 1 EACH | Refills: 0 | Status: SHIPPED | OUTPATIENT
Start: 2024-11-07

## 2024-11-07 NOTE — PROGRESS NOTES
Subjective:   Evon Liriano is a 49 year old female who presents for Sore Throat (Cough since [place] 7/10 pain patient denies any fevers)       History/Other:    Chief Complaint Reviewed and Verified  Nursing Notes Reviewed and   Verified  Allergies Reviewed  Medications Reviewed  Problem List   Reviewed  OB Status Reviewed         Tobacco:  She has never smoked tobacco.    Current Outpatient Medications   Medication Sig Dispense Refill    albuterol 108 (90 Base) MCG/ACT Inhalation Aero Soln Inhale 2 puffs into the lungs every 6 (six) hours as needed for Wheezing or Shortness of Breath. 1 each 0    predniSONE 10 MG Oral Tab Take 3 tabs days 1-3 then 2 tabs days 4-6 then 1 tab day 7-9 18 tablet 0    UNITHROID 88 MCG Oral Tab Take 1 tablet (88 mcg total) by mouth before breakfast. 90 tablet 0    MOUNJARO 10 MG/0.5ML Subcutaneous Solution Pen-injector INJECT 10 MG UNDER THE SKIN ONCE A WEEK 8 mL 0    Tirzepatide (MOUNJARO) 10 MG/0.5ML Subcutaneous Solution Pen-injector Inject 10 mg into the skin once a week. 6 mL 3    PHENTERMINE HCL 37.5 MG Oral Tab TAKE 1 TABLET(37.5 MG) BY MOUTH EVERY MORNING BEFORE BREAKFAST 90 tablet 0    MAGNESIUM OR Take by mouth.      GARLIC OR Take by mouth.      atorvastatin 20 MG Oral Tab Take 1 tablet (20 mg total) by mouth nightly. 90 tablet 3    Glucose Blood (CONTOUR NEXT TEST) In Vitro Strip Check blood sugar once daily 100 strip 3    Microlet Lancets Does not apply Misc Use to test blood sugar once daily. 100 each 3         Review of Systems:  Review of Systems   Constitutional: Negative.    HENT:  Positive for sore throat.    Respiratory:  Positive for cough and wheezing.          Objective:   /75   Pulse 96   Wt 184 lb (83.5 kg)   SpO2 97%   BMI 32.59 kg/m²  Estimated body mass index is 32.59 kg/m² as calculated from the following:    Height as of 11/2/24: 5' 3\" (1.6 m).    Weight as of this encounter: 184 lb (83.5 kg).  Physical Exam  Vitals reviewed.    Constitutional:       Appearance: Normal appearance.   HENT:      Right Ear: Tympanic membrane normal.      Left Ear: Tympanic membrane normal.      Mouth/Throat:      Mouth: Mucous membranes are moist.   Cardiovascular:      Rate and Rhythm: Normal rate and regular rhythm.   Pulmonary:      Effort: Pulmonary effort is normal.      Breath sounds: Wheezing present.   Neurological:      Mental Status: She is alert.           Assessment & Plan:   1. Acute bronchitis, unspecified organism (Primary)  2. Seasonal asthma (HCC)  -     Albuterol Sulfate HFA; Inhale 2 puffs into the lungs every 6 (six) hours as needed for Wheezing or Shortness of Breath.  Dispense: 1 each; Refill: 0  Other orders  -     predniSONE; Take 3 tabs days 1-3 then 2 tabs days 4-6 then 1 tab day 7-9  Dispense: 18 tablet; Refill: 0  She is familiar with inhalers.  Add prednisone.       No follow-ups on file.    Domingo Monte DO, 11/7/2024, 9:52 AM

## 2024-11-08 DIAGNOSIS — F41.9 ANXIETY AND DEPRESSION: ICD-10-CM

## 2024-11-08 DIAGNOSIS — F32.A ANXIETY AND DEPRESSION: ICD-10-CM

## 2024-11-11 RX ORDER — PAROXETINE 40 MG/1
40 TABLET, FILM COATED ORAL EVERY MORNING
Qty: 90 TABLET | Refills: 2 | Status: SHIPPED | OUTPATIENT
Start: 2024-11-11

## 2024-11-11 NOTE — TELEPHONE ENCOUNTER
Patient called, verified name/.  Had physical 2024.  Was prescribed paroxetine 40 mg, #90/3 refills, but chart showed it was discontinued, which is an error as she has been taking it.  Dr Bella, please advise on refill?  Pended.

## 2024-11-11 NOTE — TELEPHONE ENCOUNTER
Patient called (identified name and ),   Is out of paroxetine 40 mg, wants refill.  Routed to Refill Pool to run protocol.

## 2024-11-14 DIAGNOSIS — E66.812 CLASS 2 SEVERE OBESITY DUE TO EXCESS CALORIES WITH SERIOUS COMORBIDITY AND BODY MASS INDEX (BMI) OF 38.0 TO 38.9 IN ADULT (HCC): ICD-10-CM

## 2024-11-14 DIAGNOSIS — E66.01 CLASS 2 SEVERE OBESITY DUE TO EXCESS CALORIES WITH SERIOUS COMORBIDITY AND BODY MASS INDEX (BMI) OF 38.0 TO 38.9 IN ADULT (HCC): ICD-10-CM

## 2024-11-14 DIAGNOSIS — E11.65 TYPE 2 DIABETES MELLITUS WITH HYPERGLYCEMIA, WITHOUT LONG-TERM CURRENT USE OF INSULIN (HCC): ICD-10-CM

## 2024-11-20 DIAGNOSIS — E66.01 CLASS 2 SEVERE OBESITY DUE TO EXCESS CALORIES WITH SERIOUS COMORBIDITY AND BODY MASS INDEX (BMI) OF 38.0 TO 38.9 IN ADULT (HCC): ICD-10-CM

## 2024-11-20 DIAGNOSIS — E66.812 CLASS 2 SEVERE OBESITY DUE TO EXCESS CALORIES WITH SERIOUS COMORBIDITY AND BODY MASS INDEX (BMI) OF 38.0 TO 38.9 IN ADULT (HCC): ICD-10-CM

## 2024-11-20 DIAGNOSIS — E11.65 TYPE 2 DIABETES MELLITUS WITH HYPERGLYCEMIA, WITHOUT LONG-TERM CURRENT USE OF INSULIN (HCC): ICD-10-CM

## 2024-11-21 RX ORDER — PHENTERMINE HYDROCHLORIDE 37.5 MG/1
37.5 TABLET ORAL
Qty: 90 TABLET | Refills: 1 | Status: CANCELLED | OUTPATIENT
Start: 2024-11-21

## 2024-11-25 DIAGNOSIS — E11.65 TYPE 2 DIABETES MELLITUS WITH HYPERGLYCEMIA, WITHOUT LONG-TERM CURRENT USE OF INSULIN (HCC): ICD-10-CM

## 2024-11-25 DIAGNOSIS — E66.01 CLASS 2 SEVERE OBESITY DUE TO EXCESS CALORIES WITH SERIOUS COMORBIDITY AND BODY MASS INDEX (BMI) OF 38.0 TO 38.9 IN ADULT (HCC): ICD-10-CM

## 2024-11-25 DIAGNOSIS — E66.812 CLASS 2 SEVERE OBESITY DUE TO EXCESS CALORIES WITH SERIOUS COMORBIDITY AND BODY MASS INDEX (BMI) OF 38.0 TO 38.9 IN ADULT (HCC): ICD-10-CM

## 2024-11-25 NOTE — TELEPHONE ENCOUNTER
Endocrine Refill protocol for weight management    Protocol Criteria:  FAILED Reason: No Visit in required time frame    If below requirement is met, send a 90-day supply with 1 refill per provider protocol.    Verify appointment with Endocrinology completed in the last 6 months or scheduled in the next 3 months.    Last completed office visit:9/4/2024 Hanna Jean MD   Next scheduled Follow up:   Future Appointments   Date Time Provider Department Center   3/5/2025  5:00 PM [facility]

## 2024-11-27 RX ORDER — PHENTERMINE HYDROCHLORIDE 37.5 MG/1
37.5 TABLET ORAL
Qty: 90 TABLET | Refills: 0 | Status: SHIPPED | OUTPATIENT
Start: 2024-11-27

## 2024-12-02 RX ORDER — PHENTERMINE HYDROCHLORIDE 37.5 MG/1
37.5 TABLET ORAL
Qty: 90 TABLET | Refills: 0 | OUTPATIENT
Start: 2024-12-02

## 2024-12-17 ENCOUNTER — OFFICE VISIT (OUTPATIENT)
Dept: FAMILY MEDICINE CLINIC | Facility: CLINIC | Age: 49
End: 2024-12-17
Payer: COMMERCIAL

## 2024-12-17 VITALS
DIASTOLIC BLOOD PRESSURE: 72 MMHG | WEIGHT: 185 LBS | HEART RATE: 94 BPM | HEIGHT: 63 IN | TEMPERATURE: 99 F | BODY MASS INDEX: 32.78 KG/M2 | SYSTOLIC BLOOD PRESSURE: 104 MMHG

## 2024-12-17 DIAGNOSIS — J02.9 SORE THROAT: Primary | ICD-10-CM

## 2024-12-17 PROCEDURE — 99213 OFFICE O/P EST LOW 20 MIN: CPT | Performed by: PHYSICIAN ASSISTANT

## 2024-12-17 PROCEDURE — 3008F BODY MASS INDEX DOCD: CPT | Performed by: PHYSICIAN ASSISTANT

## 2024-12-17 PROCEDURE — 87880 STREP A ASSAY W/OPTIC: CPT | Performed by: PHYSICIAN ASSISTANT

## 2024-12-17 PROCEDURE — 3078F DIAST BP <80 MM HG: CPT | Performed by: PHYSICIAN ASSISTANT

## 2024-12-17 PROCEDURE — 3074F SYST BP LT 130 MM HG: CPT | Performed by: PHYSICIAN ASSISTANT

## 2024-12-17 RX ORDER — PHENOBARBITAL 16.2 MG/1
TABLET ORAL
COMMUNITY
Start: 2024-11-30

## 2024-12-17 RX ORDER — AZITHROMYCIN 250 MG/1
TABLET, FILM COATED ORAL
Qty: 6 TABLET | Refills: 0 | Status: SHIPPED | OUTPATIENT
Start: 2024-12-17 | End: 2024-12-22

## 2024-12-17 NOTE — PROGRESS NOTES
HPI:     Sore Throat   This is a new problem. The current episode started yesterday. The problem has been gradually worsening. The maximum temperature recorded prior to her arrival was 102 - 102.9 F. The fever has been present for Less than 1 day. Associated symptoms include headaches, a hoarse voice and swollen glands. Pertinent negatives include no congestion, coughing, diarrhea, ear discharge, ear pain, shortness of breath or vomiting. She has tried acetaminophen for the symptoms. The treatment provided no relief.       Medications:     Current Outpatient Medications   Medication Sig Dispense Refill    PHENobarbital 16.2 MG Oral Tab       azithromycin 250 MG Oral Tab Take 2 tablets (500 mg total) by mouth daily for 1 day, THEN 1 tablet (250 mg total) daily for 4 days. 6 tablet 0    Phentermine HCl 37.5 MG Oral Tab Take 1 tablet (37.5 mg total) by mouth before breakfast. 90 tablet 0    PARoxetine HCl 40 MG Oral Tab Take 1 tablet (40 mg total) by mouth every morning. 90 tablet 2    albuterol 108 (90 Base) MCG/ACT Inhalation Aero Soln Inhale 2 puffs into the lungs every 6 (six) hours as needed for Wheezing or Shortness of Breath. 1 each 0    predniSONE 10 MG Oral Tab Take 3 tabs days 1-3 then 2 tabs days 4-6 then 1 tab day 7-9 18 tablet 0    UNITHROID 88 MCG Oral Tab Take 1 tablet (88 mcg total) by mouth before breakfast. 90 tablet 0    MOUNJARO 10 MG/0.5ML Subcutaneous Solution Pen-injector INJECT 10 MG UNDER THE SKIN ONCE A WEEK 8 mL 0    MAGNESIUM OR Take by mouth.      GARLIC OR Take by mouth.      atorvastatin 20 MG Oral Tab Take 1 tablet (20 mg total) by mouth nightly. 90 tablet 3    Glucose Blood (CONTOUR NEXT TEST) In Vitro Strip Check blood sugar once daily 100 strip 3    Microlet Lancets Does not apply Misc Use to test blood sugar once daily. 100 each 3       Allergies:   Allergies[1]    History:     Health Maintenance   Topic Date Due    Pneumococcal Vaccine: Birth to 64yrs (1 of 2 - PCV) Never done     Diabetes Care Dilated Eye Exam  02/28/2024    COVID-19 Vaccine (3 - 2024-25 season) 09/01/2024    Zoster Vaccines (1 of 2) Never done    Influenza Vaccine (1) Never done    Diabetes Care A1C  03/04/2025    Mammogram  06/27/2025    Diabetes Care Foot Exam  08/07/2025    Asthma Control Test  08/07/2025    Annual Physical  08/07/2025    Diabetes Care: GFR  11/01/2025    Diabetes Care: Microalb/Creat Ratio  11/01/2025    Gyne Pelvic Exam  11/02/2025    Pap Smear  06/29/2026    Colorectal Cancer Screening  03/07/2027    DTaP,Tdap,and Td Vaccines (2 - Td or Tdap) 10/05/2032    Annual Depression Screening  Completed       No LMP recorded. (Menstrual status: Other).   Past Medical History:     Past Medical History:    Anxiety    Zoloft    Asthma (HCC)    Benign tumor of pituitary gland (HCC)    Bulimia nervosa in remission    Chest pain    hospitalize    Depression    mes--stopped on own    Diabetes (HCC)    GERD (gastroesophageal reflux disease)    Hypercholesteremia    IBS (irritable bowel syndrome)    Insomnia    Zoloft    MRSA (methicillin resistant Staphylococcus aureus)    Obesity (BMI 30-39.9)    MATILDE (obstructive sleep apnea)    PMDD (premenstrual dysphoric disorder)    Zoloft    Thyroid disease    Vertigo       Past Surgical History:     Past Surgical History:   Procedure Laterality Date    Colonoscopy  2017    Gosia    Egd  06/29/2022    Tubal ligation  2005    'scope    Upper gi endoscopy,exam  2011    EGD       Family History:     Family History   Problem Relation Age of Onset    Hypertension Father     Hypertension Mother     Heart Disease Maternal Grandmother     Other (lymphoma) Maternal Grandmother     Other (lung cancer) Maternal Grandfather     Diabetes Paternal Grandmother     Breast Cancer Other         mat great aunt age 50s    Glaucoma Neg     Macular degeneration Neg        Social History:     Social History     Socioeconomic History    Marital status: Single     Spouse name: Not on file    Number of  children: Not on file    Years of education: Not on file    Highest education level: Not on file   Occupational History    Not on file   Tobacco Use    Smoking status: Never    Smokeless tobacco: Never   Vaping Use    Vaping status: Never Used   Substance and Sexual Activity    Alcohol use: Yes     Comment: socially     Drug use: No    Sexual activity: Yes     Birth control/protection: I.U.D., Tubal Ligation   Other Topics Concern     Service Not Asked    Blood Transfusions Not Asked    Caffeine Concern Yes     Comment: soda, tea, 8 oz daily    Occupational Exposure Not Asked    Hobby Hazards Not Asked    Sleep Concern Not Asked    Stress Concern Not Asked    Weight Concern Not Asked    Special Diet Not Asked    Back Care Not Asked    Exercise Not Asked    Bike Helmet Not Asked    Seat Belt Not Asked    Self-Exams Not Asked    Grew up on a farm Not Asked    History of tanning Not Asked    Outdoor occupation Not Asked    Pt has a pacemaker No    Pt has a defibrillator No    Breast feeding Not Asked    Reaction to local anesthetic No   Social History Narrative    Not on file     Social Drivers of Health     Financial Resource Strain: Not on file   Food Insecurity: Not on file   Transportation Needs: Not on file   Physical Activity: Not on file   Stress: Not on file   Social Connections: Not on file   Housing Stability: Not on file       Review of Systems:   Review of Systems   HENT:  Positive for hoarse voice and sore throat. Negative for congestion, ear discharge and ear pain.    Respiratory:  Negative for cough and shortness of breath.    Gastrointestinal:  Negative for diarrhea and vomiting.   Neurological:  Positive for headaches.        Vitals:    12/17/24 1530   BP: 104/72   Pulse: 94   Temp: 98.9 °F (37.2 °C)   Weight: 185 lb (83.9 kg)   Height: 5' 3\" (1.6 m)     Body mass index is 32.77 kg/m².    Physical Exam:   Physical Exam  Vitals reviewed.   Constitutional:       General: She is not in acute  distress.     Appearance: She is well-developed.   HENT:      Head: Normocephalic and atraumatic.      Right Ear: Tympanic membrane, ear canal and external ear normal. There is no impacted cerumen.      Left Ear: Tympanic membrane, ear canal and external ear normal. There is no impacted cerumen.      Nose: Nose normal.      Mouth/Throat:      Mouth: Mucous membranes are moist.      Pharynx: Oropharynx is clear. Posterior oropharyngeal erythema present. No oropharyngeal exudate.   Eyes:      General:         Right eye: No discharge.         Left eye: No discharge.      Conjunctiva/sclera: Conjunctivae normal.   Cardiovascular:      Rate and Rhythm: Normal rate and regular rhythm.      Heart sounds: Normal heart sounds, S1 normal and S2 normal. No murmur heard.  Pulmonary:      Effort: Pulmonary effort is normal.      Breath sounds: Normal breath sounds. No wheezing or rales.   Chest:      Chest wall: No tenderness.   Lymphadenopathy:      Cervical: Cervical adenopathy present.   Skin:     Findings: No rash.   Neurological:      Mental Status: She is alert and oriented to person, place, and time.   Psychiatric:         Behavior: Behavior is cooperative.          Assessment and Plan::     Problem List Items Addressed This Visit    None  Visit Diagnoses       Sore throat    -  Primary    Relevant Medications    azithromycin 250 MG Oral Tab    Other Relevant Orders    POC Rapid Strep [63707] (Completed)    Grp A Strep Cult, Throat [E]        Supportive care includes:    encourage fluid intake   Advise patient to take Tylenol/Ibuprofen as needed for pain.  warm salt water gargles   humidifier     Discussed plan of care with pt and pt is in agreement.All questions answered. Pt to call with questions or concerns.         [1]   Allergies  Allergen Reactions    Amoxicillin HIVES    Potassium HIVES    Vancomycin ITCHING

## 2025-01-18 DIAGNOSIS — E03.9 HYPOTHYROIDISM, UNSPECIFIED TYPE: ICD-10-CM

## 2025-01-20 RX ORDER — LEVOTHYROXINE SODIUM 88 UG/1
88 TABLET ORAL
Qty: 90 TABLET | Refills: 0 | Status: SHIPPED | OUTPATIENT
Start: 2025-01-20

## 2025-01-20 NOTE — TELEPHONE ENCOUNTER
Endocrine refill protocol for medications for hypothyroidism and hyperthyroidism    Protocol Criteria:  FAILED Reason: Abnormal labs    If all below requirements are met, send a 90-day supply with 1 refill per provider protocol.    Verify appointment with Endocrinology completed in the last 12 months or scheduled in the next 6 months.    Normal TSH result in the past 12 months   Review recent telephone encounters and mychart communications with patient to ensure a dose change has not occurred since last office visit that was not updated in the medication history list     Last completed office visit:9/4/2024 Hanna Jean MD   Next scheduled Follow up:   Future Appointments   Date Time Provider Department Center   3/5/2025  5:00 PM Hanna Jean MD [facility] [place]      Last TSH result:   TSH   Date Value Ref Range Status   11/01/2024 0.348 (L) 0.550 - 4.780 uIU/mL Final     TSH (S)   Date Value Ref Range Status   11/24/2014 0.22 (L) 0.34 - 5.60 uIU/mL Final

## 2025-01-28 ENCOUNTER — TELEPHONE (OUTPATIENT)
Dept: FAMILY MEDICINE CLINIC | Facility: CLINIC | Age: 50
End: 2025-01-28

## 2025-02-04 NOTE — TELEPHONE ENCOUNTER
Endocrine Refill protocol for oral and injectable diabetic medications    Protocol Criteria:  PASSED  Reason: N/A    If all below requirements are met, send a 90-day supply with 1 refill per provider protocol.    Verify appointment with Endocrinology completed in the last 6 months or scheduled in the next 3 months.  Verify A1C has been completed within the last 6 months and is below 8.5%     Last completed office visit: 9/4/2024 Hanna Jean MD   Next scheduled Follow up:   Future Appointments   Date Time Provider Department Center   3/5/2025  5:00 PM Hanna Jean MD [facility]      Last A1c result: Last A1c value was 5.2% done 9/4/2024.

## 2025-02-06 ENCOUNTER — TELEPHONE (OUTPATIENT)
Dept: ENDOCRINOLOGY CLINIC | Facility: CLINIC | Age: 50
End: 2025-02-06

## 2025-02-06 RX ORDER — TIRZEPATIDE 10 MG/.5ML
10 INJECTION, SOLUTION SUBCUTANEOUS WEEKLY
Qty: 9 ML | Refills: 1 | Status: SHIPPED | OUTPATIENT
Start: 2025-02-06 | End: 2025-02-10

## 2025-02-06 NOTE — TELEPHONE ENCOUNTER
Freya from [employer] calling to find out the quantity for the Mounjaro as it come in 1 box 2 mL.

## 2025-02-10 ENCOUNTER — TELEPHONE (OUTPATIENT)
Dept: ENDOCRINOLOGY CLINIC | Facility: CLINIC | Age: 50
End: 2025-02-10

## 2025-02-10 DIAGNOSIS — E11.65 TYPE 2 DIABETES MELLITUS WITH HYPERGLYCEMIA, WITHOUT LONG-TERM CURRENT USE OF INSULIN (HCC): Primary | ICD-10-CM

## 2025-02-10 RX ORDER — TIRZEPATIDE 10 MG/.5ML
10 INJECTION, SOLUTION SUBCUTANEOUS WEEKLY
Qty: 6 ML | Refills: 1 | Status: SHIPPED | OUTPATIENT
Start: 2025-02-10

## 2025-02-10 NOTE — TELEPHONE ENCOUNTER
Per Freya need new script because Mounjaro pens only come in 2ml boxes, script was sent for 9ml with 1 refill, and there not able to break the boxes. Per protocol clinical staff will send new script over.

## 2025-02-27 DIAGNOSIS — E11.65 TYPE 2 DIABETES MELLITUS WITH HYPERGLYCEMIA, WITHOUT LONG-TERM CURRENT USE OF INSULIN (HCC): ICD-10-CM

## 2025-02-27 DIAGNOSIS — E66.01 CLASS 2 SEVERE OBESITY DUE TO EXCESS CALORIES WITH SERIOUS COMORBIDITY AND BODY MASS INDEX (BMI) OF 38.0 TO 38.9 IN ADULT (HCC): ICD-10-CM

## 2025-02-27 DIAGNOSIS — E66.812 CLASS 2 SEVERE OBESITY DUE TO EXCESS CALORIES WITH SERIOUS COMORBIDITY AND BODY MASS INDEX (BMI) OF 38.0 TO 38.9 IN ADULT (HCC): ICD-10-CM

## 2025-02-27 RX ORDER — PHENTERMINE HYDROCHLORIDE 37.5 MG/1
37.5 TABLET ORAL
Qty: 90 TABLET | Refills: 0 | Status: SHIPPED | OUTPATIENT
Start: 2025-02-27

## 2025-02-27 NOTE — TELEPHONE ENCOUNTER
Endocrine Refill protocol for weight management    Protocol Criteria:  PASSED Reason: N/A    If below requirement is met, send a 90-day supply with 1 refill per provider protocol.    Verify appointment with Endocrinology completed in the last 6 months or scheduled in the next 3 months.    Last completed office visit:9/4/2024 Hanna Jean MD   Next scheduled Follow up:   Future Appointments   Date Time Provider Department Center   3/5/2025  5:00 PM Hanna Jean MD [facility]

## 2025-03-05 ENCOUNTER — OFFICE VISIT (OUTPATIENT)
Dept: ENDOCRINOLOGY CLINIC | Facility: CLINIC | Age: 50
End: 2025-03-05
Payer: COMMERCIAL

## 2025-03-05 VITALS
SYSTOLIC BLOOD PRESSURE: 99 MMHG | DIASTOLIC BLOOD PRESSURE: 68 MMHG | HEIGHT: 63 IN | HEART RATE: 90 BPM | BODY MASS INDEX: 31.18 KG/M2 | WEIGHT: 176 LBS

## 2025-03-05 DIAGNOSIS — E03.9 HYPOTHYROIDISM, UNSPECIFIED TYPE: ICD-10-CM

## 2025-03-05 DIAGNOSIS — E78.00 HYPERCHOLESTEREMIA: ICD-10-CM

## 2025-03-05 DIAGNOSIS — E66.9 OBESITY (BMI 30-39.9): ICD-10-CM

## 2025-03-05 DIAGNOSIS — R74.8 ELEVATED ALKALINE PHOSPHATASE LEVEL: ICD-10-CM

## 2025-03-05 DIAGNOSIS — E11.65 TYPE 2 DIABETES MELLITUS WITH HYPERGLYCEMIA, WITHOUT LONG-TERM CURRENT USE OF INSULIN (HCC): Primary | ICD-10-CM

## 2025-03-05 DIAGNOSIS — E55.9 VITAMIN D DEFICIENCY: ICD-10-CM

## 2025-03-05 DIAGNOSIS — D35.2 PITUITARY MICROADENOMA (HCC): ICD-10-CM

## 2025-03-05 LAB
GLUCOSE BLOOD: 94
HEMOGLOBIN A1C: 5.1 % (ref 4.3–5.6)
TEST STRIP EXPIRATION DATE: NORMAL DATE
TEST STRIP LOT #: NORMAL NUMERIC

## 2025-03-05 PROCEDURE — 82947 ASSAY GLUCOSE BLOOD QUANT: CPT | Performed by: INTERNAL MEDICINE

## 2025-03-05 PROCEDURE — 99214 OFFICE O/P EST MOD 30 MIN: CPT | Performed by: INTERNAL MEDICINE

## 2025-03-05 PROCEDURE — 3044F HG A1C LEVEL LT 7.0%: CPT | Performed by: INTERNAL MEDICINE

## 2025-03-05 PROCEDURE — 3074F SYST BP LT 130 MM HG: CPT | Performed by: INTERNAL MEDICINE

## 2025-03-05 PROCEDURE — 3008F BODY MASS INDEX DOCD: CPT | Performed by: INTERNAL MEDICINE

## 2025-03-05 PROCEDURE — 83036 HEMOGLOBIN GLYCOSYLATED A1C: CPT | Performed by: INTERNAL MEDICINE

## 2025-03-05 PROCEDURE — 3078F DIAST BP <80 MM HG: CPT | Performed by: INTERNAL MEDICINE

## 2025-03-05 NOTE — PROGRESS NOTES
Name: Evon Liriano  Date: 3/05/25    Referring Physician: No ref. provider found    HISTORY OF PRESENT ILLNESS   Evon Liriano is a 49 year old female who presents for evaluation and management of type 2 diabetes. She was diagnosed with diabetes prior to her seeing me, and I had started her on Mounjaro initially. She was doing well on this. However, her insurance stopped covering this and I had to switch her to Trulicity. She is now back on the Mounjaro.    She also has a history of a pituitary microadenoma that was diagnosed in 2014 when she was having irregular menstrual cycles and elevated prolactin was discovered. She has had a repeat pituitary MRI, which shows that the microadenoma has decreased in size by 7/2023. She has an IUD now and does think that she is close to being post-menopausal.     Blood Glucose Today: 94  HbA1C or glycohemoglobin is 5.1 today (and it was 5.2 on 9/04/24 and it was 5.5 on 6/05/24 and it was 6.2 on 3/06/24 and it was 5.8 on 10/11/23 and it was 5.8 on 6/22/23 and it was 6.7 on 11/01/22)  Type 1 or Type 2?: Type 2  Medications for DM: Mounjaro 10mg qweekly  Checking a few times per month  Fasting- below 100  2 hours after eating- 2 hours after eating- 108  Episodes of hypoglycemia: occasionally when she does not eat for many hours, she feels very cold and if she checks blood sugars, they are in the 60s.     Dietary compliance: she is eating healthy    Exercise: she is trying to exercise    Polyuria/polydipsia: none    Blurred vision: none    Flu Vaccine This Season: yes    Covid Vaccine: yes    REVIEW OF SYSTEMS  CV: Cardiovascular disease present: none         Hypertension present: yes, at goal, not on meds         Hyperlipidemia present: at goal, on meds (11/01/24)         Peripheral Vascular Disease present: none    : Nephropathy present: MAC: none (11/01/24) Creatinine: 0.93     Neuro: Neuropathy present: none    Eyes: Diabetic retinopathy present: no            Most  recent visit to eye doctor in last 12 months: 2/28/23    Skin: Infection or ulceration: none    Osteoporosis/ Osteopenia: normal BMD on 7/06/24; Vitamin D: 32.5 (11/01/24)    Thyroid disease: yes  TSH: 0.348 (11/01/24); LT4 88mcg     Medications:     Current Outpatient Medications:     Tirzepatide (MOUNJARO) 10 MG/0.5ML Subcutaneous Solution Auto-injector, Inject 10 mg into the skin once a week., Disp: 6 mL, Rfl: 1    PHENTERMINE HCL 37.5 MG Oral Tab, TAKE 1 TABLET(37.5 MG) BY MOUTH BEFORE BREAKFAST, Disp: 90 tablet, Rfl: 0    UNITHROID 88 MCG Oral Tab, Take 1 tablet (88 mcg total) by mouth before breakfast., Disp: 90 tablet, Rfl: 0    PHENobarbital 16.2 MG Oral Tab, , Disp: , Rfl:     PARoxetine HCl 40 MG Oral Tab, Take 1 tablet (40 mg total) by mouth every morning., Disp: 90 tablet, Rfl: 2    albuterol 108 (90 Base) MCG/ACT Inhalation Aero Soln, Inhale 2 puffs into the lungs every 6 (six) hours as needed for Wheezing or Shortness of Breath., Disp: 1 each, Rfl: 0    predniSONE 10 MG Oral Tab, Take 3 tabs days 1-3 then 2 tabs days 4-6 then 1 tab day 7-9, Disp: 18 tablet, Rfl: 0    MAGNESIUM OR, Take by mouth., Disp: , Rfl:     GARLIC OR, Take by mouth., Disp: , Rfl:     atorvastatin 20 MG Oral Tab, Take 1 tablet (20 mg total) by mouth nightly., Disp: 90 tablet, Rfl: 3    Glucose Blood (CONTOUR NEXT TEST) In Vitro Strip, Check blood sugar once daily, Disp: 100 strip, Rfl: 3    Microlet Lancets Does not apply Misc, Use to test blood sugar once daily., Disp: 100 each, Rfl: 3     Allergies:   Allergies   Allergen Reactions    Amoxicillin HIVES    Potassium HIVES    Vancomycin ITCHING       Social History:   Social History     Socioeconomic History    Marital status: Single   Tobacco Use    Smoking status: Never    Smokeless tobacco: Never   Vaping Use    Vaping status: Never Used   Substance and Sexual Activity    Alcohol use: Yes     Comment: socially     Drug use: No    Sexual activity: Yes     Birth  control/protection: I.U.D., Tubal Ligation   Other Topics Concern    Caffeine Concern Yes     Comment: soda, tea, 8 oz daily    Pt has a pacemaker No    Pt has a defibrillator No    Reaction to local anesthetic No       Medical History:   Past Medical History:    Anxiety    Zoloft    Asthma (HCC)    Benign tumor of pituitary gland (HCC)    Bulimia nervosa in remission    Chest pain    hospitalize    Depression    mes--stopped on own    Diabetes (HCC)    GERD (gastroesophageal reflux disease)    Hypercholesteremia    IBS (irritable bowel syndrome)    Insomnia    Zoloft    MRSA (methicillin resistant Staphylococcus aureus)    Obesity (BMI 30-39.9)    MATILDE (obstructive sleep apnea)    PMDD (premenstrual dysphoric disorder)    Zoloft    Thyroid disease    Vertigo       Surgical history:   Past Surgical History:   Procedure Laterality Date    Colonoscopy  2017    Gosia    Egd  06/29/2022    Tubal ligation  2005    'scope    Upper gi endoscopy,exam  2011    EGD       PHYSICAL EXAM  Vitals:    03/05/25 1702   BP: 99/68   Pulse: 90   Weight: 176 lb (79.8 kg)   Height: 5' 3\" (1.6 m)           General Appearance:  alert, well developed, in no acute distress  Eyes:  normal conjunctivae, sclera., normal sclera and normal pupils  Neuro:  sensory grossly intact and motor grossly intact, normal monofilament exam  Psychiatric:  oriented to time, self, and place  Nutritional:  no abnormal weight gain or loss  Bilateral barefoot skin diabetic exam is normal, visualized feet and the appearance is normal.  Bilateral monofilament/sensation of both feet is normal.  Pulsation pedal pulse exam of both lower legs/feet is normal as well.        Lab Data:   Lab Results   Component Value Date     06/22/2023    A1C 5.2 09/04/2024     Lab Results   Component Value Date    GLU 84 11/01/2024    BUN 12 11/01/2024    BUNCREA 12.9 11/01/2024    CREATSERUM 0.93 11/01/2024    ANIONGAP 8 11/01/2024    GFRNAA 77 06/20/2022    GFRAA 89 06/20/2022     CA 10.0 11/01/2024    OSMOCALC 289 11/01/2024    ALKPHO 186 (H) 11/01/2024    AST 23 11/01/2024    ALT 33 11/01/2024    ALKPHOS 72 03/11/2014    BILT 0.5 11/01/2024    TP 8.0 11/01/2024    ALB 5.0 (H) 11/01/2024    GLOBULIN 3.0 11/01/2024    AGRATIO 1.2 03/11/2014     11/01/2024    K 4.1 11/01/2024     11/01/2024    CO2 28.0 11/01/2024     Lab Results   Component Value Date    CHOLEST 151 11/01/2024    TRIG 89 11/01/2024    HDL 55 11/01/2024    LDL 79 11/01/2024    VLDL 14 11/01/2024    NONHDLC 96 11/01/2024    CALCNONHDL 183 (H) 08/26/2013     Lab Results   Component Value Date    MALBP 1.10 11/01/2024    CREUR 240.50 11/01/2024       ASSESSMENT/PLAN:  This is a 49 year-old woman here for evaluation and management of type 2 diabetes. We discussed the ABCs of DM.     1.) Hyperglycemia Management- We discussed the importance of glycemic control to prevent complications of diabetes. We discussed the importance of SBGM. I offered and provided patient education materials and offered a blood glucose log book.   - Continue Mounjaro 10mg qweekly  - Check blood sugars fasting and two hours after biggest meal    2.) Management of Diabetic Complications- We discussed the complications of diabetes include retinopathy, neuropathy, nephropathy and cardiovascular disease.   - Ophtho- up to date  - Flu and Covid vaccine- up to date  - BP- at goal, on meds  - Lipids- check in 3 months, continue 20mg of atorvastatin  - MAC- check in 3 months  - CMP- check in 3 months  - Neuropathy- none  - CAD- none    3.) Lifestyle Management for Diabetes- We discussed importance of a low CHO diet, and recommend 45gm per meal or 135gm per day. We discussed the importance of trying to follow a Mediterranean diet, with an emphasis on vegetables at every meal, with lots whole grains, and protein from either plant-based sources, or poultry and fish.   - Diet- I have given her the 'the diabetes plate', I am asking her to try and eat more  complex carbs so that she does not have low blood sugars   - Exercise- she will try to increase this     4.) Hypothyroidism- I decreased her Unithroid dose from 100mcg to 88mcg and recheck now    5.) History of Pituitary Microadenoma with mildly elevated prolactin  - Check pituitary MRI and labs in 3 months    6.) Osteopenia- check DEXA scan at next visit- normal BMD    7.) Weight loss- Continue 37.5mg of phentermine.     8.) Vitamin D deficiency- started cholecalciferol 2,000 international units and check vitamin D in 3 months    Return to clinic in 3 months    Prior to this encounter, I spent over 15 minutes with preparing for the visit, including reviewing documents from other specialties as well as from PCP and going over test results. During the face to face encounter, I spent an additional 15 minutes which were determined for follow-up. Greater than 50% of the time was spent in counseling, anticipatory guidance, and coordination of care. Patient concerns were answered to the best of my knowledge.     3/05/25  Hanna Jean MD

## 2025-04-07 ENCOUNTER — TELEPHONE (OUTPATIENT)
Dept: ENDOCRINOLOGY CLINIC | Facility: CLINIC | Age: 50
End: 2025-04-07

## 2025-04-07 DIAGNOSIS — E03.9 HYPOTHYROIDISM, UNSPECIFIED TYPE: Primary | ICD-10-CM

## 2025-04-07 NOTE — TELEPHONE ENCOUNTER
Patient requesting to speak with a nurse.   Patient would like to go on the 12.5 mounjaro.     Also, pharmacy has not receive unithroid script, patient thinks she was to start 77 MCG

## 2025-04-10 RX ORDER — TIRZEPATIDE 12.5 MG/.5ML
12.5 INJECTION, SOLUTION SUBCUTANEOUS WEEKLY
Qty: 2 ML | Refills: 0 | Status: SHIPPED | OUTPATIENT
Start: 2025-04-10

## 2025-04-10 NOTE — TELEPHONE ENCOUNTER
Called pt, LVM. [facility] sent notifying that script for Mounjaro 12.5 mg was sent and to complete labs prior to dose change.

## 2025-04-10 NOTE — TELEPHONE ENCOUNTER
Spoke with patient and states she spoke with Dr. Jean at last appointment about increasing Mounjaro from 10 mg weekly to 12.5 mg since she has been on 10 mg awhile and she is no longer losing any weight.She also states she has been taking unithyroid 88 mcg and was told at last appointment would be decreasing to 77 mcg?    Sent to Dr. Jean and Dr. Garcias.    Review LOV note from Dr. Jean on 3/5/25:    1.) Hyperglycemia Management- We discussed the importance of glycemic control to prevent complications of diabetes. We discussed the importance of SBGM. I offered and provided patient education materials and offered a blood glucose log book.   - Continue Mounjaro 10mg qweekly  - Check blood sugars fasting and two hours after biggest meal     4.) Hypothyroidism- I decreased her Unithroid dose from 100mcg to 88mcg and recheck now

## 2025-04-10 NOTE — TELEPHONE ENCOUNTER
Per LOV ON 3/5/25:  4.) Hypothyroidism- I decreased her Unithroid dose from 100mcg to 88mcg and recheck now     5.) History of Pituitary Microadenoma with mildly elevated prolactin  - Check pituitary MRI and labs in 3 months    Pt will only need to check for thyroid labs. Labs ordered by MD but thyroid lab order is also future dated for 6/5. Reorder TSH and FT4 as written order. Pt notified via [facility] that fasting is not required.

## 2025-04-22 ENCOUNTER — LAB ENCOUNTER (OUTPATIENT)
Dept: LAB | Age: 50
End: 2025-04-22
Attending: INTERNAL MEDICINE
Payer: COMMERCIAL

## 2025-04-22 DIAGNOSIS — E03.9 HYPOTHYROIDISM, UNSPECIFIED TYPE: ICD-10-CM

## 2025-04-22 LAB
T4 FREE SERPL-MCNC: 1.3 NG/DL (ref 0.8–1.7)
TSI SER-ACNC: 0.53 UIU/ML (ref 0.55–4.78)

## 2025-04-22 PROCEDURE — 84439 ASSAY OF FREE THYROXINE: CPT

## 2025-04-22 PROCEDURE — 36415 COLL VENOUS BLD VENIPUNCTURE: CPT

## 2025-04-22 PROCEDURE — 84443 ASSAY THYROID STIM HORMONE: CPT

## 2025-04-28 DIAGNOSIS — E03.9 HYPOTHYROIDISM, UNSPECIFIED TYPE: ICD-10-CM

## 2025-04-29 DIAGNOSIS — E03.9 HYPOTHYROIDISM, UNSPECIFIED TYPE: ICD-10-CM

## 2025-04-29 RX ORDER — LEVOTHYROXINE SODIUM 88 UG/1
88 TABLET ORAL
Qty: 90 TABLET | Refills: 0 | Status: SHIPPED | OUTPATIENT
Start: 2025-04-29

## 2025-04-29 NOTE — TELEPHONE ENCOUNTER
Endocrine refill protocol for medications for hypothyroidism and hyperthyroidism    Protocol Criteria:  FAILED Reason: Abnormal labs    If all below requirements are met, send a 90-day supply with 1 refill per provider protocol.    Verify appointment with Endocrinology completed in the last 12 months or scheduled in the next 6 months.    Normal TSH result in the past 12 months   Review recent telephone encounters and mychart communications with patient to ensure a dose change has not occurred since last office visit that was not updated in the medication history list     Last completed office visit:3/5/2025 Hanna Jean MD   Next scheduled Follow up:   Future Appointments   Date Time Provider Department Center   6/2/2025  9:00 AM [facility] MRI RM2 (3T WIDE) [facility] [place]   6/11/2025  5:20 PM Hanna Jean MD [facility]      Last TSH result:   TSH   Date Value Ref Range Status   04/22/2025 0.527 (L) 0.550 - 4.780 uIU/mL Final     TSH (S)   Date Value Ref Range Status   11/24/2014 0.22 (L) 0.34 - 5.60 uIU/mL Final

## 2025-04-30 NOTE — TELEPHONE ENCOUNTER
Endocrine refill protocol for medications for hypothyroidism and hyperthyroidism    Protocol Criteria:  FAILED Reason: Abnormal labs    If all below requirements are met, send a 90-day supply with 1 refill per provider protocol.    Verify appointment with Endocrinology completed in the last 12 months or scheduled in the next 6 months.    Normal TSH result in the past 12 months   Review recent telephone encounters and mychart communications with patient to ensure a dose change has not occurred since last office visit that was not updated in the medication history list     Last completed office visit:3/5/2025 Hanna Jean MD   Next scheduled Follow up:   Future Appointments   Date Time Provider Department Center   6/11/2025  5:20 PM Hanna Jean MD [facility]      Last TSH result:   TSH   Date Value Ref Range Status   04/22/2025 0.527 (L) 0.550 - 4.780 uIU/mL Final     TSH (S)   Date Value Ref Range Status   11/24/2014 0.22 (L) 0.34 - 5.60 uIU/mL Final

## 2025-05-02 RX ORDER — LEVOTHYROXINE SODIUM 88 UG/1
88 TABLET ORAL
Qty: 90 TABLET | Refills: 0 | OUTPATIENT
Start: 2025-05-02

## 2025-05-12 ENCOUNTER — TELEMEDICINE (OUTPATIENT)
Dept: FAMILY MEDICINE CLINIC | Facility: CLINIC | Age: 50
End: 2025-05-12
Payer: COMMERCIAL

## 2025-05-12 DIAGNOSIS — K59.09 CHRONIC CONSTIPATION: Primary | ICD-10-CM

## 2025-05-12 RX ORDER — AMOXICILLIN 250 MG
1 CAPSULE ORAL DAILY
Qty: 30 TABLET | Refills: 0 | Status: SHIPPED | OUTPATIENT
Start: 2025-05-12

## 2025-05-12 NOTE — PROGRESS NOTES
This visit is conducted using Telemedicine with live, interactive video and audio during this Coronavirus pandemic.    Please note that the following visit was completed using two-way, real-time interactive audio and/or video communication.  This has been done in good hector to provide continuity of care in the best interest of the provider-patient relationship, due to the ongoing public health crisis/national emergency and because of restrictions of visitation.  There are limitations of this visit as no physical exam could be performed.  Every conscious effort was taken to allow for sufficient and adequate time.  This billing was spent on reviewing labs, medications, radiology tests and decision making.  Appropriate medical decision-making and tests are ordered as detailed in the plan of care below    JOSE ANTONIO LIRIANO or legal guardian   verbally consents to a Virtual/Telephone/ VideoCheck-In service on 5/12/2025  Patient understands and accepts financial responsibility for any deductible, co-insurance and/or co-pays associated with this service.    Duration of the service: 13 mins 53 secs       HPI:    Jose Antonio Liriano is a 49 year old female.      No chief complaint on file.    Hx of chronic constipation  Trying to get in to see GI   Last week woken up at night after she started probiotic digestive advance.  Felt nauseous  Was in bathroom floor 2.30 -8 am.  Had a bm 4 am, felt a little better after     Has had multiple colonoscopies, few yrs back.  Last one 3/2017      Started pelvic floor Physical Therapy , did only 3 sessions  On weight loss medication, Mounjaro. On 2023.    Has tried colace, miralax, milk of magnesia.  Hx of IBS with contipation  No relief with linzess, was getting sick.    Has also tried prune juice/ drinks enough water/ fiber.  Last  bowel movement after enema 2 days ago but not enough came out.        ROS  A comprehensive 10 point review of systems was completed.  Pertinent positives and  negatives noted in the the HPI.      PATIENTS DENIES ANY KNOWN EXPOSURE TO ANYONE CONFIRMED FOR COVID 19.      Problem List[1]    Past Medical History[2]        MEDICATION LIST  Medications - Current[3]    ALLERGY LIST  Allergies[4]    Allergies:Allergies[5]    PHYSICAL EXAM:     Vitals signs taken at home if available:    There were no vitals taken for this visit.      Limited examination for this telephone/ video visit due to the coronavirus emergency      General Appearance:  alert, well developed, in no acute distress  Not in acute distress, comfortably seated in own residence  Patient was speaking in complete sentences, no increased work of breathing and very coherent and alert on the phone.  Throat/Neck: normal sound to voice.   Respiratory:  non-labored. no increased work of breathing.    Skin:  normal moisture and skin texture, normal color of skin, no jaundice  Hematologic:  no excessive bruising  Psychiatric:  oriented to time, self, and place  Patient was responding to questions appropriately.        ASSESSMENT/PLAN:     Encounter Diagnosis   Name Primary?    Chronic constipation Yes       1. Chronic constipation  Push fluids  Steam showers/humidifier in room  Comfortable temperature in house/bedroom  Fruits/vegetable  Increase fiber intake to 25grams/day over 5-6 week period   Metamucil, colace, miralax  Increase activity  No caffeine: will dehydrate  Colonic milking  Tylenol for pain    Stop or decrease any medications that can cause constipation.    Call/return with concerns  Follow up GI  Clear liquid diet for 48hrs   - sennosides-docusate (SENNA S) 8.6-50 MG Oral Tab; Take 1 tablet by mouth daily.  Dispense: 30 tablet; Refill: 0        Patient reminded to practice good health and safety measures including washing hands, social distancing, covering mouth when coughing/ sneezing, avoid social meetings/ gatherings in face of this Covid 19 pandemic.    Patient verbalized understanding of plan and all  questions answered to the best of my ability.    Patient to call back if any change/ worsening of symptoms.      No orders of the defined types were placed in this encounter.      Meds This Visit:  Requested Prescriptions     Signed Prescriptions Disp Refills    sennosides-docusate (SENNA S) 8.6-50 MG Oral Tab 30 tablet 0     Sig: Take 1 tablet by mouth daily.       Imaging & Referrals:  None        Bernard Bella MD    5/12/2025  4:21 PM         [1]   Patient Active Problem List  Diagnosis    Hypothyroidism    Gastroesophageal reflux disease    Pituitary microadenoma (HCC)    Obesity (BMI 30-39.9)    Binge eating disorder    Chronic constipation    Anxiety and depression    Seasonal asthma (HCC)    Hypercholesteremia    MATILDE (obstructive sleep apnea)    Elevated alkaline phosphatase level    IUD contraception    Type 2 diabetes mellitus with hyperglycemia, without long-term current use of insulin (HCC)    Hypertension    Vitamin D deficiency   [2]   Past Medical History:   Anxiety    Zoloft    Asthma (HCC)    Benign tumor of pituitary gland (HCC)    Bulimia nervosa in remission    Chest pain    hospitalize    Depression    mes--stopped on own    Diabetes (HCC)    GERD (gastroesophageal reflux disease)    Hypercholesteremia    IBS (irritable bowel syndrome)    Insomnia    Zoloft    MRSA (methicillin resistant Staphylococcus aureus)    Obesity (BMI 30-39.9)    MATILDE (obstructive sleep apnea)    PMDD (premenstrual dysphoric disorder)    Zoloft    Thyroid disease    Vertigo   [3]   Current Outpatient Medications:     sennosides-docusate (SENNA S) 8.6-50 MG Oral Tab, Take 1 tablet by mouth daily., Disp: 30 tablet, Rfl: 0    UNITHROID 88 MCG Oral Tab, Take 1 tablet (88 mcg total) by mouth before breakfast., Disp: 90 tablet, Rfl: 0    Tirzepatide (MOUNJARO) 12.5 MG/0.5ML Subcutaneous Solution Auto-injector, Inject 12.5 mg into the skin once a week., Disp: 2 mL, Rfl: 0    PHENTERMINE HCL 37.5 MG Oral Tab, TAKE 1 TABLET(37.5  MG) BY MOUTH BEFORE BREAKFAST, Disp: 90 tablet, Rfl: 0    PHENobarbital 16.2 MG Oral Tab, , Disp: , Rfl:     PARoxetine HCl 40 MG Oral Tab, Take 1 tablet (40 mg total) by mouth every morning., Disp: 90 tablet, Rfl: 2    albuterol 108 (90 Base) MCG/ACT Inhalation Aero Soln, Inhale 2 puffs into the lungs every 6 (six) hours as needed for Wheezing or Shortness of Breath., Disp: 1 each, Rfl: 0    predniSONE 10 MG Oral Tab, Take 3 tabs days 1-3 then 2 tabs days 4-6 then 1 tab day 7-9, Disp: 18 tablet, Rfl: 0    MAGNESIUM OR, Take by mouth., Disp: , Rfl:     GARLIC OR, Take by mouth., Disp: , Rfl:     atorvastatin 20 MG Oral Tab, Take 1 tablet (20 mg total) by mouth nightly., Disp: 90 tablet, Rfl: 3    Glucose Blood (CONTOUR NEXT TEST) In Vitro Strip, Check blood sugar once daily, Disp: 100 strip, Rfl: 3    Microlet Lancets Does not apply Misc, Use to test blood sugar once daily., Disp: 100 each, Rfl: 3  [4]   Allergies  Allergen Reactions    Amoxicillin HIVES    Potassium HIVES    Vancomycin ITCHING   [5]   Allergies  Allergen Reactions    Amoxicillin HIVES    Potassium HIVES    Vancomycin ITCHING

## 2025-05-13 DIAGNOSIS — E11.65 TYPE 2 DIABETES MELLITUS WITH HYPERGLYCEMIA, WITHOUT LONG-TERM CURRENT USE OF INSULIN (HCC): Primary | ICD-10-CM

## 2025-05-13 NOTE — TELEPHONE ENCOUNTER
Endocrine Refill protocol for oral and injectable diabetic medications    Protocol Criteria:  PASSED      If all below requirements are met, send a 90-day supply with 1 refill per provider protocol.    Verify appointment with Endocrinology completed in the last 6 months or scheduled in the next 3 months.  Verify A1C has been completed within the last 6 months and is below 8.5%     Last completed office visit: 3/5/2025 Hanna Jean MD   Next scheduled Follow up:   Future Appointments   Date Time Provider Department Center   6/11/2025  5:20 PM Hanna Jean MD [facility]      Last A1c result: Last A1c value was 5.1% done 3/5/2025.

## 2025-05-13 NOTE — TELEPHONE ENCOUNTER
Medication Quantity Refills Start End   Tirzepatide (MOUNJARO) 12.5 MG/0.5ML Subcutaneous Solution Auto-injector 2 mL 0 4/10/2025 --   Sig:   Inject 12.5 mg into the skin once a week.     Route:   Subcutaneous         PT REFILL REQUESTED

## 2025-05-14 RX ORDER — TIRZEPATIDE 12.5 MG/.5ML
12.5 INJECTION, SOLUTION SUBCUTANEOUS WEEKLY
Qty: 6 ML | Refills: 0 | Status: SHIPPED | OUTPATIENT
Start: 2025-05-14

## 2025-06-02 ENCOUNTER — LAB ENCOUNTER (OUTPATIENT)
Dept: LAB | Age: 50
End: 2025-06-02
Attending: INTERNAL MEDICINE
Payer: COMMERCIAL

## 2025-06-07 ENCOUNTER — LAB ENCOUNTER (OUTPATIENT)
Dept: LAB | Age: 50
End: 2025-06-07
Attending: INTERNAL MEDICINE
Payer: COMMERCIAL

## 2025-06-07 DIAGNOSIS — E55.9 VITAMIN D DEFICIENCY: ICD-10-CM

## 2025-06-07 DIAGNOSIS — E03.9 HYPOTHYROIDISM, UNSPECIFIED TYPE: ICD-10-CM

## 2025-06-07 DIAGNOSIS — E11.65 TYPE 2 DIABETES MELLITUS WITH HYPERGLYCEMIA, WITHOUT LONG-TERM CURRENT USE OF INSULIN (HCC): ICD-10-CM

## 2025-06-07 DIAGNOSIS — R74.8 ELEVATED ALKALINE PHOSPHATASE LEVEL: ICD-10-CM

## 2025-06-07 DIAGNOSIS — E78.00 HYPERCHOLESTEREMIA: ICD-10-CM

## 2025-06-07 DIAGNOSIS — D35.2 PITUITARY MICROADENOMA (HCC): ICD-10-CM

## 2025-06-07 LAB
ALBUMIN SERPL-MCNC: 4.7 G/DL (ref 3.2–4.8)
ALBUMIN/GLOB SERPL: 1.7 {RATIO} (ref 1–2)
ALP LIVER SERPL-CCNC: 160 U/L (ref 39–100)
ALT SERPL-CCNC: 52 U/L (ref 10–49)
ANION GAP SERPL CALC-SCNC: 11 MMOL/L (ref 0–18)
AST SERPL-CCNC: 19 U/L (ref ?–34)
BILIRUB SERPL-MCNC: 0.6 MG/DL (ref 0.3–1.2)
BUN BLD-MCNC: 15 MG/DL (ref 9–23)
BUN/CREAT SERPL: 14.2 (ref 10–20)
CALCIUM BLD-MCNC: 10 MG/DL (ref 8.7–10.4)
CHLORIDE SERPL-SCNC: 103 MMOL/L (ref 98–112)
CHOLEST SERPL-MCNC: 155 MG/DL (ref ?–200)
CO2 SERPL-SCNC: 26 MMOL/L (ref 21–32)
CORTIS SERPL-MCNC: 16.6 UG/DL
CREAT BLD-MCNC: 1.06 MG/DL (ref 0.55–1.02)
CREAT UR-SCNC: 131.3 MG/DL
EGFRCR SERPLBLD CKD-EPI 2021: 64 ML/MIN/1.73M2 (ref 60–?)
ESTRADIOL SERPL-MCNC: 15.3 PG/ML
FASTING PATIENT LIPID ANSWER: YES
FASTING STATUS PATIENT QL REPORTED: YES
FSH SERPL-ACNC: 64.3 MIU/ML
GGT SERPL-CCNC: 220 U/L (ref ?–38)
GLOBULIN PLAS-MCNC: 2.7 G/DL (ref 2–3.5)
GLUCOSE BLD-MCNC: 93 MG/DL (ref 70–99)
HDLC SERPL-MCNC: 58 MG/DL (ref 40–59)
LDLC SERPL CALC-MCNC: 82 MG/DL (ref ?–100)
LH SERPL-ACNC: 73.7 MIU/ML
MICROALBUMIN UR-MCNC: 0.8 MG/DL
MICROALBUMIN/CREAT 24H UR-RTO: 6.1 UG/MG (ref ?–30)
NONHDLC SERPL-MCNC: 97 MG/DL (ref ?–130)
OSMOLALITY SERPL CALC.SUM OF ELEC: 291 MOSM/KG (ref 275–295)
POTASSIUM SERPL-SCNC: 4.1 MMOL/L (ref 3.5–5.1)
PROLACTIN SERPL-MCNC: 19.8 NG/ML
PROT SERPL-MCNC: 7.4 G/DL (ref 5.7–8.2)
SODIUM SERPL-SCNC: 140 MMOL/L (ref 136–145)
T4 FREE SERPL-MCNC: 1.3 NG/DL (ref 0.8–1.7)
TRIGL SERPL-MCNC: 77 MG/DL (ref 30–149)
TSI SER-ACNC: 1.07 UIU/ML (ref 0.55–4.78)
VIT D+METAB SERPL-MCNC: 32.2 NG/ML (ref 30–100)
VLDLC SERPL CALC-MCNC: 12 MG/DL (ref 0–30)

## 2025-06-07 PROCEDURE — 84146 ASSAY OF PROLACTIN: CPT

## 2025-06-07 PROCEDURE — 82043 UR ALBUMIN QUANTITATIVE: CPT

## 2025-06-07 PROCEDURE — 84439 ASSAY OF FREE THYROXINE: CPT

## 2025-06-07 PROCEDURE — 83001 ASSAY OF GONADOTROPIN (FSH): CPT

## 2025-06-07 PROCEDURE — 83002 ASSAY OF GONADOTROPIN (LH): CPT

## 2025-06-07 PROCEDURE — 82977 ASSAY OF GGT: CPT

## 2025-06-07 PROCEDURE — 84443 ASSAY THYROID STIM HORMONE: CPT

## 2025-06-07 PROCEDURE — 84075 ASSAY ALKALINE PHOSPHATASE: CPT

## 2025-06-07 PROCEDURE — 84305 ASSAY OF SOMATOMEDIN: CPT

## 2025-06-07 PROCEDURE — 82024 ASSAY OF ACTH: CPT

## 2025-06-07 PROCEDURE — 82670 ASSAY OF TOTAL ESTRADIOL: CPT

## 2025-06-07 PROCEDURE — 84080 ASSAY ALKALINE PHOSPHATASES: CPT

## 2025-06-07 PROCEDURE — 82570 ASSAY OF URINE CREATININE: CPT

## 2025-06-07 PROCEDURE — 80053 COMPREHEN METABOLIC PANEL: CPT

## 2025-06-07 PROCEDURE — 36415 COLL VENOUS BLD VENIPUNCTURE: CPT

## 2025-06-07 PROCEDURE — 82533 TOTAL CORTISOL: CPT

## 2025-06-07 PROCEDURE — 80061 LIPID PANEL: CPT

## 2025-06-07 PROCEDURE — 82306 VITAMIN D 25 HYDROXY: CPT

## 2025-06-10 LAB — ACTH: 14.1 PG/ML

## 2025-06-11 ENCOUNTER — OFFICE VISIT (OUTPATIENT)
Dept: ENDOCRINOLOGY CLINIC | Facility: CLINIC | Age: 50
End: 2025-06-11
Payer: COMMERCIAL

## 2025-06-11 ENCOUNTER — TELEPHONE (OUTPATIENT)
Dept: ENDOCRINOLOGY CLINIC | Facility: CLINIC | Age: 50
End: 2025-06-11

## 2025-06-11 VITALS
DIASTOLIC BLOOD PRESSURE: 63 MMHG | SYSTOLIC BLOOD PRESSURE: 117 MMHG | HEART RATE: 75 BPM | BODY MASS INDEX: 30.59 KG/M2 | WEIGHT: 172.63 LBS | HEIGHT: 63 IN

## 2025-06-11 DIAGNOSIS — E66.811 CLASS 1 OBESITY DUE TO EXCESS CALORIES WITH SERIOUS COMORBIDITY AND BODY MASS INDEX (BMI) OF 30.0 TO 30.9 IN ADULT: Primary | ICD-10-CM

## 2025-06-11 DIAGNOSIS — E03.9 HYPOTHYROIDISM, UNSPECIFIED TYPE: ICD-10-CM

## 2025-06-11 DIAGNOSIS — Z78.0 MENOPAUSE: ICD-10-CM

## 2025-06-11 DIAGNOSIS — E66.09 CLASS 1 OBESITY DUE TO EXCESS CALORIES WITH SERIOUS COMORBIDITY AND BODY MASS INDEX (BMI) OF 30.0 TO 30.9 IN ADULT: Primary | ICD-10-CM

## 2025-06-11 DIAGNOSIS — E78.5 DYSLIPIDEMIA: ICD-10-CM

## 2025-06-11 DIAGNOSIS — E55.9 VITAMIN D DEFICIENCY: ICD-10-CM

## 2025-06-11 DIAGNOSIS — E11.65 TYPE 2 DIABETES MELLITUS WITH HYPERGLYCEMIA, WITHOUT LONG-TERM CURRENT USE OF INSULIN (HCC): ICD-10-CM

## 2025-06-11 LAB
ALK PHOSPHATASE: 180 IU/L
BONE FRAC: 24 %
CARTRIDGE EXPIRATION DATE: NORMAL DATE
GLUCOSE BLOOD: 139
HEMOGLOBIN A1C: 5.1 % (ref 4.3–5.6)
INTESTINAL FRAC: 0 %
LIVER FRAC: 76 %
TEST STRIP LOT #: NORMAL NUMERIC

## 2025-06-11 PROCEDURE — 3061F NEG MICROALBUMINURIA REV: CPT | Performed by: INTERNAL MEDICINE

## 2025-06-11 PROCEDURE — 3078F DIAST BP <80 MM HG: CPT | Performed by: INTERNAL MEDICINE

## 2025-06-11 PROCEDURE — 83036 HEMOGLOBIN GLYCOSYLATED A1C: CPT | Performed by: INTERNAL MEDICINE

## 2025-06-11 PROCEDURE — 99214 OFFICE O/P EST MOD 30 MIN: CPT | Performed by: INTERNAL MEDICINE

## 2025-06-11 PROCEDURE — 3074F SYST BP LT 130 MM HG: CPT | Performed by: INTERNAL MEDICINE

## 2025-06-11 PROCEDURE — 3008F BODY MASS INDEX DOCD: CPT | Performed by: INTERNAL MEDICINE

## 2025-06-11 PROCEDURE — 82947 ASSAY GLUCOSE BLOOD QUANT: CPT | Performed by: INTERNAL MEDICINE

## 2025-06-11 PROCEDURE — 3044F HG A1C LEVEL LT 7.0%: CPT | Performed by: INTERNAL MEDICINE

## 2025-06-11 RX ORDER — ESTRADIOL 0.05 MG/D
1 PATCH TRANSDERMAL WEEKLY
Qty: 12 PATCH | Refills: 3 | Status: SHIPPED | OUTPATIENT
Start: 2025-06-11

## 2025-06-11 RX ORDER — TIRZEPATIDE 12.5 MG/.5ML
12.5 INJECTION, SOLUTION SUBCUTANEOUS WEEKLY
Qty: 6 ML | Refills: 3 | Status: SHIPPED | OUTPATIENT
Start: 2025-06-11

## 2025-06-11 RX ORDER — PHENTERMINE HYDROCHLORIDE 37.5 MG/1
37.5 TABLET ORAL
Qty: 30 TABLET | Refills: 3 | Status: SHIPPED | OUTPATIENT
Start: 2025-06-11

## 2025-06-11 RX ORDER — PROGESTERONE 100 MG/1
100 CAPSULE ORAL NIGHTLY
Qty: 90 CAPSULE | Refills: 2 | Status: SHIPPED | OUTPATIENT
Start: 2025-06-11

## 2025-06-11 NOTE — PROGRESS NOTES
Name: Evon Liriano  Date: 6/11/25    Referring Physician: No ref. provider found    HISTORY OF PRESENT ILLNESS   Evon Liriano is a 49 year old female who presents for evaluation and management of type 2 diabetes. She was diagnosed with diabetes prior to her seeing me, and I had started her on Mounjaro initially. She was doing well on this. However, her insurance stopped covering this and I had to switch her to Trulicity. She is now back on the Mounjaro.    She also has a history of a pituitary microadenoma that was diagnosed in 2014 when she was having irregular menstrual cycles and elevated prolactin was discovered. She has had a repeat pituitary MRI, which shows that the microadenoma has decreased in size by 7/2023. She had an IUD at the last visit, but has had it removed.      She has symptoms of menopause now.     Blood Glucose Today: 139  HbA1C or glycohemoglobin is 5.1 today (and it was 5.1 on 3/05/25 and it was 5.2 on 9/04/24 and it was 5.5 on 6/05/24 and it was 6.2 on 3/06/24 and it was 5.8 on 10/11/23 and it was 5.8 on 6/22/23 and it was 6.7 on 11/01/22)  Type 1 or Type 2?: Type 2  Medications for DM: Mounjaro 12.5mg qweekly  Checking a few times per month  Fasting- below 100  2 hours after eating- 2 hours after eating- 108  Episodes of hypoglycemia: occasionally when she does not eat for many hours, she feels very cold and if she checks blood sugars, they are in the 60s.     Dietary compliance: she is eating healthy    Exercise: she is trying to exercise    Polyuria/polydipsia: none    Blurred vision: none    Flu Vaccine This Season: yes    Covid Vaccine: yes    REVIEW OF SYSTEMS  CV: Cardiovascular disease present: none         Hypertension present: yes, at goal, not on meds         Hyperlipidemia present: at goal, on meds (6/07/25)         Peripheral Vascular Disease present: none    : Nephropathy present: MAC: none (6/07/25) Creatinine: 1.06     Neuro: Neuropathy present: none    Eyes:  Diabetic retinopathy present: no            Most recent visit to eye doctor in last 12 months: 2/28/23    Skin: Infection or ulceration: none    Osteoporosis/ Osteopenia: normal BMD on 7/06/24; Vitamin D: 32.2 (6/07/25)    Thyroid disease: yes  TSH: 1.074 (6/07/25); LT4 88mcg     Medications:     Current Outpatient Medications:     Tirzepatide (MOUNJARO) 12.5 MG/0.5ML Subcutaneous Solution Auto-injector, Inject 12.5 mg into the skin once a week., Disp: 6 mL, Rfl: 0    UNITHROID 88 MCG Oral Tab, Take 1 tablet (88 mcg total) by mouth before breakfast., Disp: 90 tablet, Rfl: 0    PHENTERMINE HCL 37.5 MG Oral Tab, TAKE 1 TABLET(37.5 MG) BY MOUTH BEFORE BREAKFAST, Disp: 90 tablet, Rfl: 0    sennosides-docusate (SENNA S) 8.6-50 MG Oral Tab, Take 1 tablet by mouth daily., Disp: 30 tablet, Rfl: 0    PHENobarbital 16.2 MG Oral Tab, , Disp: , Rfl:     PARoxetine HCl 40 MG Oral Tab, Take 1 tablet (40 mg total) by mouth every morning., Disp: 90 tablet, Rfl: 2    albuterol 108 (90 Base) MCG/ACT Inhalation Aero Soln, Inhale 2 puffs into the lungs every 6 (six) hours as needed for Wheezing or Shortness of Breath., Disp: 1 each, Rfl: 0    predniSONE 10 MG Oral Tab, Take 3 tabs days 1-3 then 2 tabs days 4-6 then 1 tab day 7-9, Disp: 18 tablet, Rfl: 0    MAGNESIUM OR, Take by mouth., Disp: , Rfl:     GARLIC OR, Take by mouth., Disp: , Rfl:     atorvastatin 20 MG Oral Tab, Take 1 tablet (20 mg total) by mouth nightly., Disp: 90 tablet, Rfl: 3    Glucose Blood (CONTOUR NEXT TEST) In Vitro Strip, Check blood sugar once daily, Disp: 100 strip, Rfl: 3    Microlet Lancets Does not apply Misc, Use to test blood sugar once daily., Disp: 100 each, Rfl: 3     Allergies:   Allergies   Allergen Reactions    Amoxicillin HIVES    Potassium HIVES    Vancomycin ITCHING       Social History:   Social History     Socioeconomic History    Marital status: Single   Tobacco Use    Smoking status: Never    Smokeless tobacco: Never   Vaping Use    Vaping  status: Never Used   Substance and Sexual Activity    Alcohol use: Yes     Comment: socially     Drug use: No    Sexual activity: Yes     Birth control/protection: I.U.D., Tubal Ligation   Other Topics Concern    Caffeine Concern Yes     Comment: soda, tea, 8 oz daily    Pt has a pacemaker No    Pt has a defibrillator No    Reaction to local anesthetic No       Medical History:   Past Medical History:    Anxiety    Zoloft    Asthma (HCC)    Benign tumor of pituitary gland (HCC)    Bulimia nervosa in remission    Chest pain    hospitalize    Depression    mes--stopped on own    Diabetes (HCC)    GERD (gastroesophageal reflux disease)    Hypercholesteremia    IBS (irritable bowel syndrome)    Insomnia    Zoloft    MRSA (methicillin resistant Staphylococcus aureus)    Obesity (BMI 30-39.9)    MATILDE (obstructive sleep apnea)    PMDD (premenstrual dysphoric disorder)    Zoloft    Thyroid disease    Vertigo       Surgical history:   Past Surgical History:   Procedure Laterality Date    Colonoscopy  2017    [facility]    Egd  06/29/2022    Tubal ligation  2005    'scope    Upper gi endoscopy,exam  2011    EGD       PHYSICAL EXAM  Vitals:    06/11/25 1722   BP: 117/63   Pulse: 75   Weight: 172 lb 9.6 oz (78.3 kg)   Height: 5' 3\" (1.6 m)     General Appearance:  alert, well developed, in no acute distress  Eyes:  normal conjunctivae, sclera., normal sclera and normal pupils  Neuro:  sensory grossly intact and motor grossly intact, normal monofilament exam  Psychiatric:  oriented to time, self, and place  Nutritional:  no abnormal weight gain or loss  Bilateral barefoot skin diabetic exam is normal, visualized feet and the appearance is normal.  Bilateral monofilament/sensation of both feet is normal.  Pulsation pedal pulse exam of both lower legs/feet is normal as well.        Lab Data:   Lab Results   Component Value Date     06/22/2023    A1C 5.1 03/05/2025     Lab Results   Component Value Date    GLU 93 06/07/2025    BUN  15 06/07/2025    BUNCREA 14.2 06/07/2025    CREATSERUM 1.06 (H) 06/07/2025    ANIONGAP 11 06/07/2025    GFRNAA 77 06/20/2022    GFRAA 89 06/20/2022    CA 10.0 06/07/2025    OSMOCALC 291 06/07/2025    ALKPHO 160 (H) 06/07/2025    AST 19 06/07/2025    ALT 52 (H) 06/07/2025    ALKPHOS 72 03/11/2014    BILT 0.6 06/07/2025    TP 7.4 06/07/2025    ALB 4.7 06/07/2025    GLOBULIN 2.7 06/07/2025    AGRATIO 1.2 03/11/2014     06/07/2025    K 4.1 06/07/2025     06/07/2025    CO2 26.0 06/07/2025     Lab Results   Component Value Date    CHOLEST 155 06/07/2025    TRIG 77 06/07/2025    HDL 58 06/07/2025    LDL 82 06/07/2025    VLDL 12 06/07/2025    NONHDLC 97 06/07/2025    CALCNONHDL 183 (H) 08/26/2013     Lab Results   Component Value Date    MALBP 0.80 06/07/2025    CREUR 131.30 06/07/2025       ASSESSMENT/PLAN:  This is a 49 year-old woman here for evaluation and management of type 2 diabetes. We discussed the ABCs of DM.     1.) Hyperglycemia Management- We discussed the importance of glycemic control to prevent complications of diabetes. We discussed the importance of SBGM. I offered and provided patient education materials and offered a blood glucose log book.   - Continue Mounjaro 12.5mg qweekly  - Check blood sugars fasting and two hours after biggest meal    2.) Management of Diabetic Complications- We discussed the complications of diabetes include retinopathy, neuropathy, nephropathy and cardiovascular disease.   - Ophtho- up to date  - Flu and Covid vaccine- up to date  - BP- at goal, on meds  - Lipids- at goal, check in 6 months, continue 20mg of atorvastatin  - MAC- at goal, check in 6 months  - CMP- at goal, check in 6 months  - Neuropathy- none  - CAD- none    3.) Lifestyle Management for Diabetes- We discussed importance of a low CHO diet, and recommend 45gm per meal or 135gm per day. We discussed the importance of trying to follow a Mediterranean diet, with an emphasis on vegetables at every meal,  with lots whole grains, and protein from either plant-based sources, or poultry and fish.   - Diet- I have given her the 'the diabetes plate', I am asking her to try and eat more complex carbs so that she does not have low blood sugars   - Exercise- she will try to increase this     4.) Hypothyroidism- She is on Unithroid dose 88mcg and TSH is normal    5.) History of Pituitary Microadenoma with mildly elevated prolactin  - Check pituitary MRI and labs are pending    6.) Osteopenia- check DEXA scan at next visit- normal BMD in 7/08/24    7.) Weight loss- Continue 37.5mg of phentermine.     8.) Vitamin D deficiency- started cholecalciferol 2,000 international units and vitamin D is 32.2; check periodically    9.) Menopause- start HRT    10.) Elevated Alk Phos- continues to be elevated, GGT is elevated, some labs are pending; will message PCP    Return to clinic in 6 months    Prior to this encounter, I spent over 15 minutes with preparing for the visit, including reviewing documents from other specialties as well as from PCP and going over test results. During the face to face encounter, I spent an additional 15 minutes which were determined for follow-up. Greater than 50% of the time was spent in counseling, anticipatory guidance, and coordination of care. Patient concerns were answered to the best of my knowledge.     6/11/25  Hanna Jean MD

## 2025-06-12 LAB
ALKALINE PHOSPHATASE BONE SPECIFIC: 18.6 UG/L
IGF I: 118 NG/ML
IGF-1, Z SCORE: -0.4 S.D.

## 2025-06-12 NOTE — TELEPHONE ENCOUNTER
Marcell Bella!    During the course of my treatment of your patient, I had noted that alkaline phosphatase was elevated. Gamma glutamyl transpeptidase as well as ALT are also elevated. I am bringing this to your attention, in case you think that it is necessary to follow this. I know that you have worked this up before.     I have also started her on estrogen, in the patch form along with progesterone. If you would like me to hold off on this for now, I can contact her and ask her to not start it yet.     Thank you.    Hanna

## 2025-06-13 NOTE — TELEPHONE ENCOUNTER
Thanks Hanna  Will have her follow up with me   May be best to hold off estrogen until further workup and will have her see GI as well, unless you think the estrogen should be ok

## 2025-06-13 NOTE — TELEPHONE ENCOUNTER
Advised patient of Dr Bella's note. Patient verbalized understanding. Appointment made.     Future Appointments   Date Time Provider Department Center   6/17/2025 11:00 AM [facility]   7/17/2025  1:00 PM Gilson Art MD [facility]   12/10/2025  5:00 PM [facility]

## 2025-06-17 ENCOUNTER — LAB ENCOUNTER (OUTPATIENT)
Dept: LAB | Age: 50
End: 2025-06-17
Attending: FAMILY MEDICINE
Payer: COMMERCIAL

## 2025-06-17 ENCOUNTER — OFFICE VISIT (OUTPATIENT)
Dept: FAMILY MEDICINE CLINIC | Facility: CLINIC | Age: 50
End: 2025-06-17
Payer: COMMERCIAL

## 2025-06-17 VITALS
TEMPERATURE: 98 F | WEIGHT: 171 LBS | HEART RATE: 82 BPM | DIASTOLIC BLOOD PRESSURE: 68 MMHG | HEIGHT: 63 IN | BODY MASS INDEX: 30.3 KG/M2 | SYSTOLIC BLOOD PRESSURE: 100 MMHG

## 2025-06-17 DIAGNOSIS — R74.8 ELEVATED ALKALINE PHOSPHATASE LEVEL: Primary | ICD-10-CM

## 2025-06-17 DIAGNOSIS — G44.52 HEADACHE, NEW DAILY PERSISTENT (NDPH): ICD-10-CM

## 2025-06-17 DIAGNOSIS — R74.8 ELEVATED ALKALINE PHOSPHATASE LEVEL: ICD-10-CM

## 2025-06-17 DIAGNOSIS — R74.8 ELEVATED LIVER ENZYMES: ICD-10-CM

## 2025-06-17 DIAGNOSIS — R74.8 HIGH GAMMA GLUTAMYL TRANSFERASE (GGT): ICD-10-CM

## 2025-06-17 DIAGNOSIS — K59.09 CHRONIC CONSTIPATION: ICD-10-CM

## 2025-06-17 LAB
BASOPHILS # BLD AUTO: 0.05 X10(3) UL (ref 0–0.2)
BASOPHILS NFR BLD AUTO: 0.9 %
DEPRECATED RDW RBC AUTO: 43.1 FL (ref 35.1–46.3)
EOSINOPHIL # BLD AUTO: 0.08 X10(3) UL (ref 0–0.7)
EOSINOPHIL NFR BLD AUTO: 1.5 %
ERYTHROCYTE [DISTWIDTH] IN BLOOD BY AUTOMATED COUNT: 13.2 % (ref 11–15)
ERYTHROCYTE [SEDIMENTATION RATE] IN BLOOD: 19 MM/HR (ref 0–30)
HAV IGM SER QL: NONREACTIVE
HBV CORE IGM SER QL: NONREACTIVE
HBV SURFACE AG SERPL QL IA: NONREACTIVE
HCT VFR BLD AUTO: 38.2 % (ref 35–48)
HCV AB SERPL QL IA: NONREACTIVE
HGB BLD-MCNC: 13.2 G/DL (ref 12–16)
IMM GRANULOCYTES # BLD AUTO: 0.02 X10(3) UL (ref 0–1)
IMM GRANULOCYTES NFR BLD: 0.4 %
LYMPHOCYTES # BLD AUTO: 1.71 X10(3) UL (ref 1–4)
LYMPHOCYTES NFR BLD AUTO: 31.8 %
MCH RBC QN AUTO: 30.8 PG (ref 26–34)
MCHC RBC AUTO-ENTMCNC: 34.6 G/DL (ref 31–37)
MCV RBC AUTO: 89 FL (ref 80–100)
MONOCYTES # BLD AUTO: 0.37 X10(3) UL (ref 0.1–1)
MONOCYTES NFR BLD AUTO: 6.9 %
NEUTROPHILS # BLD AUTO: 3.15 X10 (3) UL (ref 1.5–7.7)
NEUTROPHILS # BLD AUTO: 3.15 X10(3) UL (ref 1.5–7.7)
NEUTROPHILS NFR BLD AUTO: 58.5 %
PLATELET # BLD AUTO: 351 10(3)UL (ref 150–450)
RBC # BLD AUTO: 4.29 X10(6)UL (ref 3.8–5.3)
WBC # BLD AUTO: 5.4 X10(3) UL (ref 4–11)

## 2025-06-17 PROCEDURE — 99214 OFFICE O/P EST MOD 30 MIN: CPT | Performed by: FAMILY MEDICINE

## 2025-06-17 PROCEDURE — 85652 RBC SED RATE AUTOMATED: CPT

## 2025-06-17 PROCEDURE — 80074 ACUTE HEPATITIS PANEL: CPT

## 2025-06-17 PROCEDURE — 3008F BODY MASS INDEX DOCD: CPT | Performed by: FAMILY MEDICINE

## 2025-06-17 PROCEDURE — 3078F DIAST BP <80 MM HG: CPT | Performed by: FAMILY MEDICINE

## 2025-06-17 PROCEDURE — 3074F SYST BP LT 130 MM HG: CPT | Performed by: FAMILY MEDICINE

## 2025-06-17 PROCEDURE — 85025 COMPLETE CBC W/AUTO DIFF WBC: CPT

## 2025-06-17 PROCEDURE — 36415 COLL VENOUS BLD VENIPUNCTURE: CPT

## 2025-06-17 NOTE — PROGRESS NOTES
HPI:    Patient ID: Evon Liriano is a 49 year old female.      Headache   Pertinent negatives include no abdominal pain, coughing, dizziness, fever, nausea, numbness, seizures, vomiting or weakness.       Chief Complaint   Patient presents with    Follow - Up     On liver function     Headache     Headaches for long time worst within the last 2 months          Wt Readings from Last 6 Encounters:   06/17/25 171 lb (77.6 kg)   06/11/25 172 lb 9.6 oz (78.3 kg)   03/05/25 176 lb (79.8 kg)   12/17/24 185 lb (83.9 kg)   11/07/24 184 lb (83.5 kg)   11/02/24 185 lb (83.9 kg)     BP Readings from Last 3 Encounters:   06/17/25 100/68   06/11/25 117/63   03/05/25 99/68     Component      Latest Ref Rng 11/1/2024 6/7/2025   Glucose      70 - 99 mg/dL 84  93    Sodium      136 - 145 mmol/L 140  140    Potassium      3.5 - 5.1 mmol/L 4.1  4.1    Chloride      98 - 112 mmol/L 104  103    Carbon Dioxide, Total      21.0 - 32.0 mmol/L 28.0  26.0    ANION GAP      0 - 18 mmol/L 8  11    BUN      9 - 23 mg/dL 12  15    CREATININE      0.55 - 1.02 mg/dL 0.93  1.06 (H)    BUN/CREATININE RATIO      10.0 - 20.0  12.9  14.2    CALCIUM      8.7 - 10.4 mg/dL 10.0  10.0    CALCULATED OSMOLALITY      275 - 295 mOsm/kg 289  291    EGFR      >=60 mL/min/1.73m2 75  64    ALT (SGPT)      10 - 49 U/L 33  52 (H)    AST (SGOT)      <34 U/L 23  19    ALKALINE PHOSPHATASE      39 - 100 U/L 186 (H)  160 (H)    Total Bilirubin      0.3 - 1.2 mg/dL 0.5  0.6    PROTEIN, TOTAL      5.7 - 8.2 g/dL 8.0  7.4    Albumin      3.2 - 4.8 g/dL 5.0 (H)  4.7    Globulin      2.0 - 3.5 g/dL 3.0  2.7    A/G Ratio      1.0 - 2.0  1.7  1.7    Patient Fasting for CMP? Yes  Yes    Alk Phosphatase      44 - 121 IU/L  180 (H)    Liver Frac      18 - 85 %  76    Bone Frac      14 - 68 %  24    Intestinal Frac      0 - 18 %  0    Alkaline Phosphatase, Bone-Specific      ug/L  18.6    GAMMA GLUTAMYL TRANSFERASE      <38 U/L  220 (H)       Legend:  (H) High    Denies  heavy alcohol, drinks maybe 1-2 x month  No herbal meds.    Has an appointment to see DR Art (GI) next month  Has had chronic constipation  Can go sometimes 14 days with no bm  Has to use enema  Has had colonoscopy.  Didn't do pelvic floor Physical Therapy     C/o  new headaches since last 2 months  Headaches can be frontal as well as top of head and as well of the back of the head  Admits drinking enough water.  Denies any visual changes.  She is overdue for her eye exam.  Denies issues with balance numbness tingling or weakness in her arms or legs other than some numbness in her hands  She mentions having history of cluster headaches in the past but they were way worse.  This headache and range from 4-10 out of 10.  She states the headache is constant and is there when she goes to sleep and wakes up with a headache as well.  The headache does not wake her from sleep at night          Review of Systems   Constitutional:  Negative for chills and fever.   Eyes:  Negative for visual disturbance.   Respiratory:  Negative for cough, shortness of breath and wheezing.    Cardiovascular:  Negative for chest pain, palpitations and leg swelling.   Gastrointestinal:  Positive for constipation. Negative for abdominal pain, anal bleeding, blood in stool, diarrhea, nausea, rectal pain and vomiting.   Genitourinary:  Negative for decreased urine volume and difficulty urinating.   Neurological:  Positive for headaches. Negative for dizziness, tremors, seizures, weakness, light-headedness and numbness.   Psychiatric/Behavioral:  The patient is not nervous/anxious.        /68   Pulse 82   Temp 97.6 °F (36.4 °C) (Temporal)   Ht 5' 3\" (1.6 m)   Wt 171 lb (77.6 kg)   BMI 30.29 kg/m²        Current Medications[1]  Allergies:Allergies[2]   PHYSICAL EXAM:     Chief Complaint   Patient presents with    Follow - Up     On liver function     Headache     Headaches for long time worst within the last 2 months         Physical  Exam  Vitals reviewed.   Eyes:      Extraocular Movements: Extraocular movements intact.      Conjunctiva/sclera: Conjunctivae normal.   Cardiovascular:      Rate and Rhythm: Normal rate and regular rhythm.      Pulses: Normal pulses.      Heart sounds: Normal heart sounds.   Pulmonary:      Effort: Pulmonary effort is normal.      Breath sounds: Normal breath sounds.   Abdominal:      General: There is no distension.      Palpations: There is no mass.      Tenderness: There is generalized abdominal tenderness. There is no right CVA tenderness, left CVA tenderness, guarding or rebound.      Hernia: No hernia is present.      Comments: Mild tenderness with deep palpation   Musculoskeletal:      Cervical back: Normal range of motion and neck supple.   Neurological:      Mental Status: She is alert.      Cranial Nerves: Cranial nerves 2-12 are intact.      Sensory: Sensation is intact.      Motor: Motor function is intact.      Coordination: Coordination is intact.      Gait: Gait is intact.                ASSESSMENT/PLAN:     Encounter Diagnoses   Name Primary?    Elevated alkaline phosphatase level Yes    High gamma glutamyl transferase (GGT)     Chronic constipation     Headache, new daily persistent ([facility])     Elevated liver enzymes        1. Elevated alkaline phosphatase level    - US LIVER (CPT=76705); Future  - Hepatitis Panel, Acute (4) [E]; Future    2. High gamma glutamyl transferase (GGT)    - US LIVER (CPT=76705); Future  - Hepatitis Panel, Acute (4) [E]; Future    3. Chronic constipation  Follow up GI  Follow up pelvic floor therapy  - PHYSICAL THERAPY - INTERNAL    4. Headache, new daily persistent ([facility])  If worsening to go to ER   - CT BRAIN OR HEAD (CPT=70450); Future  - Sed Rate, Westergren (Automated) [E]; Future  - CBC With Differential With Platelet; Future    5. Elevated liver enzymes  Follow up GI  Has appointment       Orders Placed This Encounter   Procedures    Hepatitis Panel, Acute (4) [E]     Sed Rate, Westergren (Automated) [E]    CBC With Differential With Platelet         The above note was creating using Dragon speech recognition technology. Please excuse any typos    Meds This Visit:  Requested Prescriptions      No prescriptions requested or ordered in this encounter       Imaging & Referrals:  PHYSICAL THERAPY - INTERNAL  GASTRO - INTERNAL  US LIVER (CPT=76705)  CT BRAIN OR HEAD (CPT=70450)       ID#[id number]       [1]   Current Outpatient Medications   Medication Sig Dispense Refill    Tirzepatide (MOUNJARO) 12.5 MG/0.5ML Subcutaneous Solution Auto-injector Inject 12.5 mg into the skin once a week. 6 mL 3    Phentermine HCl 37.5 MG Oral Tab Take 1 tablet (37.5 mg total) by mouth before breakfast. 30 tablet 3    sennosides-docusate (SENNA S) 8.6-50 MG Oral Tab Take 1 tablet by mouth daily. 30 tablet 0    UNITHROID 88 MCG Oral Tab Take 1 tablet (88 mcg total) by mouth before breakfast. 90 tablet 0    PARoxetine HCl 40 MG Oral Tab Take 1 tablet (40 mg total) by mouth every morning. 90 tablet 2    albuterol 108 (90 Base) MCG/ACT Inhalation Aero Soln Inhale 2 puffs into the lungs every 6 (six) hours as needed for Wheezing or Shortness of Breath. 1 each 0    MAGNESIUM OR Take by mouth.      GARLIC OR Take by mouth.      atorvastatin 20 MG Oral Tab Take 1 tablet (20 mg total) by mouth nightly. 90 tablet 3    Glucose Blood (CONTOUR NEXT TEST) In Vitro Strip Check blood sugar once daily 100 strip 3    Microlet Lancets Does not apply Misc Use to test blood sugar once daily. 100 each 3    progesterone 100 MG Oral Cap Take 1 capsule (100 mg total) by mouth nightly. (Patient not taking: Reported on 6/17/2025) 90 capsule 2    estradiol 0.05 MG/24HR Transdermal Patch Weekly Place 1 patch onto the skin once a week. (Patient not taking: Reported on 6/17/2025) 12 patch 3    PHENobarbital 16.2 MG Oral Tab       predniSONE 10 MG Oral Tab Take 3 tabs days 1-3 then 2 tabs days 4-6 then 1 tab day 7-9 18 tablet 0   [2]    Allergies  Allergen Reactions    Amoxicillin HIVES    Potassium HIVES    Vancomycin ITCHING

## 2025-07-17 ENCOUNTER — OFFICE VISIT (OUTPATIENT)
Dept: GASTROENTEROLOGY | Facility: CLINIC | Age: 50
End: 2025-07-17
Payer: COMMERCIAL

## 2025-07-17 VITALS
WEIGHT: 173.38 LBS | DIASTOLIC BLOOD PRESSURE: 70 MMHG | SYSTOLIC BLOOD PRESSURE: 110 MMHG | BODY MASS INDEX: 30.72 KG/M2 | HEIGHT: 63 IN | HEART RATE: 112 BPM

## 2025-07-17 DIAGNOSIS — R10.84 GENERALIZED ABDOMINAL PAIN: Primary | ICD-10-CM

## 2025-07-17 DIAGNOSIS — R93.5 ABNORMAL CT SCAN, PELVIS: ICD-10-CM

## 2025-07-17 DIAGNOSIS — K59.04 CHRONIC IDIOPATHIC CONSTIPATION: ICD-10-CM

## 2025-07-17 DIAGNOSIS — K58.1 IRRITABLE BOWEL SYNDROME WITH CONSTIPATION: ICD-10-CM

## 2025-07-17 DIAGNOSIS — R63.4 WEIGHT LOSS: ICD-10-CM

## 2025-07-17 DIAGNOSIS — R11.2 NAUSEA AND VOMITING, UNSPECIFIED VOMITING TYPE: ICD-10-CM

## 2025-07-17 DIAGNOSIS — R74.8 ELEVATED ALKALINE PHOSPHATASE LEVEL: ICD-10-CM

## 2025-07-17 DIAGNOSIS — R93.5 ABNORMAL CT OF THE ABDOMEN: ICD-10-CM

## 2025-07-17 PROCEDURE — 3078F DIAST BP <80 MM HG: CPT | Performed by: INTERNAL MEDICINE

## 2025-07-17 PROCEDURE — 3074F SYST BP LT 130 MM HG: CPT | Performed by: INTERNAL MEDICINE

## 2025-07-17 PROCEDURE — 3008F BODY MASS INDEX DOCD: CPT | Performed by: INTERNAL MEDICINE

## 2025-07-17 PROCEDURE — 99204 OFFICE O/P NEW MOD 45 MIN: CPT | Performed by: INTERNAL MEDICINE

## 2025-07-17 NOTE — PATIENT INSTRUCTIONS
Schedule the CT scan of your abdomen pelvis that have ordered  Complete the bowel preparation I have ordered for you.  As we discussed drink this slowly over the course of a day with other clear liquids throughout.  I have prescribed a new medication called to [place] in order for your irritable bowel syndrome with constipation.  Plan to start your pelvic floor physical therapy as we discussed  Try to schedule an appointment with Dr. Kim we seen at [facility] in the past.  Please see a hepatologist - I recommend seeing Dr. Rufus Ureña from the [facility] who comes to [place] twice a month. You can call [phone] to schedule an appointment with him.    Jerrica Coreas [address] [place] [id number]  [phone]    Fax: [phone]

## 2025-07-17 NOTE — H&P
[facility] - Gastroenterology                                                                                                  Clinic History and Physical       Referring provider: Jena    Chief Complaint   Patient presents with    Constipation     Bloating ,elevated liver enzymes     HPI:   Evon Liriano is a 49 year old woman with history of BMI 37, anxiety, insomnia, asthma, benign tumor of the pituitary gland, bulimia nervosa in remission, depression, hypercholesterolemia, thyroid disease, sleep apnea, GERD, IBS, chronic constipation here today regarding elevated alk phos level and chronic constipation    Here today regarding gastrointestinal symptoms.  Similar to those she has had over many years though worse in the last year.  She describes worsening abdominal pain, new nausea/vomiting, and worsening constipation.  Says even enemas that have been able to help with bowel movements in the past are not working.  Oral medication she describes causing worsening pain and vomiting.  Again has tried many over-the-counter medications including stool softener, fiber, MiraLAX, senna, Dulcolax, magnesium, and enemas.  She is planning to start pelvic floor physical therapy again as she has not done this in some years.  She has lost about 50 pounds over the last year which is attributed to the medications she is taking and changes in diet/behavior modification.  No blood in the stool.    History, Medications, Allergies, ROS:      Past Medical History[1]   Past Surgical History[2]   Family Hx: Family History[3]   Social History: Short Social Hx on File[4]     Medications (Active prior to today's visit):  Current Medications[5]    Allergies:  Allergies[6]    ROS:   Systems were reviewed and were negative except as noted in the HPI    PHYSICAL EXAM:   Height 5' 3\" (1.6 m), weight 173 lb 6.4 oz (78.7 kg), not currently  breastfeeding.    General:awake, cooperative, no acute distress  HEENT: EOMI, no scleral icterus, MMM; oral pharnyx is without exudates or lesions  Neck: no lymphadenopathy; thyroid is not enlarged and without nodules  CV: RRR  Resp: non-labored breathing  Abd: soft, non-tender, non-distended  Ext: no lower extremity swelling  Neuro: Alert, Oriented X 3  Skin: no rashes, bruises  Psych: normal affect    Labs/Imaging:     Reviewed as noted in the HPI and A/P    ASSESSMENT/PLAN:   Evon Liriano is a 49 year old woman with history of BMI 37, anxiety, insomnia, asthma, benign tumor of the pituitary gland, bulimia nervosa in remission, depression, hypercholesterolemia, thyroid disease, sleep apnea, GERD, IBS, chronic constipation here today regarding elevated alk phos level and chronic constipation    She was last seen in the office in May 2022 regarding issues of acid reflux described for several years taking omeprazole OTC daily not helping.  Also taking Tums on top of that.  Noted weight increase.  She described continued issues with constipation almost never having a bowel movement unless she would take an enema.  She also described taking multiple over-the-counter medications and multiple therapeutic classes including miralax, fiber/metamucil, duclolax, senna, magnesium based products.  She had also tried Linzess and Trulance which would give her diarrhea and so she would only take sometimes.    Seen previously at other visits over the years prior regarding chronic constipation, altered bowel habits, GERD.  She was noted to have been seen also at [facility] in 2018 by Dr. Kim having anorectal manometry and pelvic floor PT which she described as helping.  She was also seen by multiple other providers in our group in the past for similar symptoms as well as other hospitals.  She was also seen in the past for elevated LFTs, alkaline phosphatase, GGT with a noted chronic very minimally elevated elevation  with workup nondiagnostic    She underwent EGD in June 2022 for acid reflux symptoms with findings of LA grade B reflux esophagitis and a small hiatal hernia with recommendations to increase her omeprazole from 20 mg to 40 mg daily.  We also discussed considering trying other medication options for her chronic constipation like tenapanor/Ibsrela or motegrity/prucalopride     Since being seen I reviewed her chart including imaging describing an ultrasound of the liver in July 2023 and ultrasound gallbladder in March 2023 both showing hepatic steatosis.  There is a CT scan of the abdomen pelvis from August 2023 for listed nausea/vomiting and weakness with description of a small hiatal hernia and IUD in the uterus with one of the limbs extending outside the myometrium.  She has noted having listed ultrasound liver scheduled.  Review of her most recent labs from June 2025 again shows a mildly elevated alkaline phosphatase as well as a mildly elevated ALT.  Similar LFTs over the last several years are noted with actually improved alkaline phosphatase.  GGT from June is still elevated.    We discussed given her worsening of symptoms with nausea and vomiting which she describes as new recommendations for CT scan for further evaluation.  However suspicion remains for uncontrolled underlying chronic idiopathic constipation and/or irritable bowel syndrome with constipation.  Discussed recommendations at this time for oral bowel preparation slowly with additional prescription medication.  She has used multiple over-the-counter medications and multiple therapeutic classes that have failed as well as Linzess, Trulance, Amitiza which she has not tolerated or improved her symptoms.  At this time recommend nap an hour.  We also discussed pursuing pelvic floor PT which has already been ordered for her.  We also discussed considering reevaluation with expert at [facility] Dr. Kim whom she has seen in the past.  Lastly we  discussed the elevated alkaline phosphatase which is again mild isolated and chronic over years.  We discussed the evaluations already taken place and consideration of further evaluation with a hepatologist whether or not something like liver biopsy would be recommended.  Referral for this has been given to her.    Recommend:  -CT A/P  -pelvic floor PT  -bowel prep   -tenapanor  -2nd opinion at [facility] with Dr. Kim whom she has seen in the past  -routine colonoscopy 2027  -hepatology eval regadring elv alk phos    I spent 45 minutes obtaining history, evaluating the patient including a medically appropriate exam, discussing treatment options and counseling and educating the patient, reviewing the patient's chart, ordering tests/medications/procedures, and completing documentation    Orders This Visit:  No orders of the defined types were placed in this encounter.    Meds This Visit:  Requested Prescriptions      No prescriptions requested or ordered in this encounter     Imaging & Referrals:  None     Gilson Art MD  [facility] - Gastroenterology  7/17/2025             [1]   Past Medical History:   Anxiety    Zoloft    Asthma (HCC)    Benign tumor of pituitary gland (HCC)    Bulimia nervosa in remission    Chest pain    hospitalize    Depression    mes--stopped on own    Diabetes (HCC)    GERD (gastroesophageal reflux disease)    Hypercholesteremia    IBS (irritable bowel syndrome)    Insomnia    Zoloft    MRSA (methicillin resistant Staphylococcus aureus)    Obesity (BMI 30-39.9)    MATILDE (obstructive sleep apnea)    PMDD (premenstrual dysphoric disorder)    Zoloft    Thyroid disease    Vertigo   [2]   Past Surgical History:  Procedure Laterality Date    Colonoscopy  2017    Gosia    Egd  06/29/2022    Tubal ligation  2005    'scope    Upper gi endoscopy,exam  2011    EGD   [3]   Family History  Problem Relation Age of Onset    Hypertension Father     Hypertension Mother     Heart Disease  Maternal Grandmother     Other (lymphoma) Maternal Grandmother     Other (lung cancer) Maternal Grandfather     Diabetes Paternal Grandmother     Breast Cancer Other         mat great aunt age 50s    Glaucoma Neg     Macular degeneration Neg    [4]   Social History  Socioeconomic History    Marital status: Single   Tobacco Use    Smoking status: Never    Smokeless tobacco: Never   Vaping Use    Vaping status: Never Used   Substance and Sexual Activity    Alcohol use: Yes     Comment: socially     Drug use: No    Sexual activity: Yes     Birth control/protection: I.U.D., Tubal Ligation   Other Topics Concern    Caffeine Concern Yes     Comment: soda, tea, 8 oz daily    Pt has a pacemaker No    Pt has a defibrillator No    Reaction to local anesthetic No   [5]   Current Outpatient Medications   Medication Sig Dispense Refill    Tirzepatide (MOUNJARO) 12.5 MG/0.5ML Subcutaneous Solution Auto-injector Inject 12.5 mg into the skin once a week. 6 mL 3    Phentermine HCl 37.5 MG Oral Tab Take 1 tablet (37.5 mg total) by mouth before breakfast. 30 tablet 3    progesterone 100 MG Oral Cap Take 1 capsule (100 mg total) by mouth nightly. (Patient not taking: Reported on 6/17/2025) 90 capsule 2    estradiol 0.05 MG/24HR Transdermal Patch Weekly Place 1 patch onto the skin once a week. (Patient not taking: Reported on 6/17/2025) 12 patch 3    sennosides-docusate (SENNA S) 8.6-50 MG Oral Tab Take 1 tablet by mouth daily. 30 tablet 0    UNITHROID 88 MCG Oral Tab Take 1 tablet (88 mcg total) by mouth before breakfast. 90 tablet 0    PHENobarbital 16.2 MG Oral Tab       PARoxetine HCl 40 MG Oral Tab Take 1 tablet (40 mg total) by mouth every morning. 90 tablet 2    albuterol 108 (90 Base) MCG/ACT Inhalation Aero Soln Inhale 2 puffs into the lungs every 6 (six) hours as needed for Wheezing or Shortness of Breath. 1 each 0    predniSONE 10 MG Oral Tab Take 3 tabs days 1-3 then 2 tabs days 4-6 then 1 tab day 7-9 18 tablet 0     MAGNESIUM OR Take by mouth.      GARLIC OR Take by mouth.      atorvastatin 20 MG Oral Tab Take 1 tablet (20 mg total) by mouth nightly. 90 tablet 3    Glucose Blood (CONTOUR NEXT TEST) In Vitro Strip Check blood sugar once daily 100 strip 3    Microlet Lancets Does not apply Misc Use to test blood sugar once daily. 100 each 3   [6]   Allergies  Allergen Reactions    Amoxicillin HIVES    Potassium HIVES    Vancomycin ITCHING

## 2025-07-21 ENCOUNTER — TELEPHONE (OUTPATIENT)
Facility: CLINIC | Age: 50
End: 2025-07-21

## 2025-08-01 ENCOUNTER — HOSPITAL ENCOUNTER (OUTPATIENT)
Dept: MRI IMAGING | Facility: HOSPITAL | Age: 50
Discharge: HOME | End: 2025-08-01
Attending: INTERNAL MEDICINE

## 2025-08-01 ENCOUNTER — HOSPITAL ENCOUNTER (OUTPATIENT)
Dept: CT IMAGING | Facility: HOSPITAL | Age: 50
Discharge: HOME | End: 2025-08-01
Attending: FAMILY MEDICINE

## 2025-08-01 DIAGNOSIS — D35.2 PITUITARY MICROADENOMA (HCC): ICD-10-CM

## 2025-08-01 DIAGNOSIS — G44.52 HEADACHE, NEW DAILY PERSISTENT (NDPH): ICD-10-CM

## 2025-08-01 PROCEDURE — 70553 MRI BRAIN STEM W/O & W/DYE: CPT | Performed by: INTERNAL MEDICINE

## 2025-08-01 PROCEDURE — A9575 INJ GADOTERATE MEGLUMI 0.1ML: HCPCS | Performed by: INTERNAL MEDICINE

## 2025-08-01 PROCEDURE — 70450 CT HEAD/BRAIN W/O DYE: CPT | Performed by: FAMILY MEDICINE

## 2025-08-01 RX ORDER — GADOTERATE MEGLUMINE 376.9 MG/ML
15 INJECTION INTRAVENOUS
Status: COMPLETED | OUTPATIENT
Start: 2025-08-01 | End: 2025-08-01

## 2025-08-01 RX ADMIN — GADOTERATE MEGLUMINE 15 ML: 376.9 INJECTION INTRAVENOUS at 13:58:00

## 2025-08-20 ENCOUNTER — HOSPITAL ENCOUNTER (OUTPATIENT)
Dept: ULTRASOUND IMAGING | Facility: HOSPITAL | Age: 50
Discharge: HOME | End: 2025-08-20
Attending: FAMILY MEDICINE

## 2025-08-20 DIAGNOSIS — R74.8 ELEVATED ALKALINE PHOSPHATASE LEVEL: ICD-10-CM

## 2025-08-20 DIAGNOSIS — R74.8 HIGH GAMMA GLUTAMYL TRANSFERASE (GGT): ICD-10-CM

## 2025-08-20 PROCEDURE — 76705 ECHO EXAM OF ABDOMEN: CPT | Performed by: FAMILY MEDICINE

## 2025-08-21 RX ORDER — LACTULOSE 10 G/15ML
10 SOLUTION ORAL 2 TIMES DAILY PRN
Qty: 900 ML | Refills: 3 | Status: SHIPPED | OUTPATIENT
Start: 2025-08-21

## (undated) DIAGNOSIS — E66.9 OBESITY (BMI 30-39.9): ICD-10-CM

## (undated) DIAGNOSIS — E03.9 HYPOTHYROIDISM, UNSPECIFIED TYPE: Primary | ICD-10-CM

## (undated) DIAGNOSIS — K21.9 GASTROESOPHAGEAL REFLUX DISEASE, UNSPECIFIED WHETHER ESOPHAGITIS PRESENT: Primary | ICD-10-CM

## (undated) DIAGNOSIS — E78.00 HYPERCHOLESTEREMIA: ICD-10-CM

## (undated) NOTE — LETTER
AUTHORIZATION FOR SURGICAL OPERATION OR OTHER PROCEDURE    1. I hereby authorize Dr. BOCANEGRA, and [facility] staff assigned to my case to perform the following operation and/or procedure [facility]:    IUD REMOVAL    2.  My physician has explained the nature and purpose of the operation or other procedure, possible alternative methods of treatment, the risks involved, and the possibility of complication to me.  I acknowledge that no guarantee has been made as to the result that may be obtained.  3.  I recognize that, during the course of this operation, or other procedure, unforseen conditions may necessitate additional or different procedure than those listed above.  I, therefore, further authorize and request that the above named physician, his/her physician assistants or designees perform such procedures as are, in his/her professional opinion, necessary and desirable.  4.  Any tissue or organs removed in the operation or other procedure may be disposed of by and at the discretion of the [facility].  5.  I understand that in the event of a medical emergency, I will be transported by local paramedics to [facility] or other hospital emergency department.  6.  I certify that I have read and fully understand the above consent to operation and/or other procedure.    7.  I acknowledge that my physician has explained sedation/analgesia administration to me including the risks and benefits.  I consent to the administration of sedation/analgesia as may be necessary or desirable in the judgement of my physician.    Witness signature: ___________________________________________________ Date:  ______/______/_____                    Time:  ________ A.M.  P.M.       Patient Name:  ______________________________________________________  (please print)      Patient signature:   ___________________________________________________             Relationship to Patient:           []  Parent    Responsible person                          []  Spouse  In case of minor or                    [] Other  _____________   Incompetent name:  __________________________________________________                               (please print)      _____________      Responsible person  In case of minor or  Incompetent signature:  _______________________________________________    Statement of Physician  My signature below affirms that prior to the time of the procedure, I have explained to the patient and/or his/her guardian, the risks and benefits involved in the proposed treatment and any reasonable alternative to the proposed treatment.  I have also explained the risks and benefits involved in the refusal of the proposed treatment and have answered the patient's questions.                        Date:  ______/______/_______  Provider                      Signature:  __________________________________________________________       Time:  ___________ A.M    P.M.

## (undated) NOTE — LETTER
10/31/2017    Patient: Bernadette Latham  : 1975 Visit date: 10/31/2017    Dear  Dr. Juliann Nava AlabamaIrene Umesh is a pleasant  36year old,White ,  OR  ETHNICITY female, who was referred to us for weight management recommendations.   Sandra

## (undated) NOTE — ED AVS SNAPSHOT
[facility] Emergency Department    [address]     [place] [address]    Phone:  [phone]    Fax:  [phone]           Vinny Samaniego   MRN: [id number]    Department:  [facility] Emergency Department   Date of Visit:  1/29 our [address] at [phone]. Your Emergency Department team is here to serve you. You are our top priority. You were examined and treated today on an urgent basis only. This was not a substitute for ongoing medical care.  Often, one Emergency Dep pertaining to these instructions have been answered in a satisfactory manner. 24-Hour Pharmacies        Pharmacy Address Phone Number   [facility] [facility] ([address]) [facility] Call [phone] for help. [employer] is NOT to be used for urgent needs. For medical emergencies, dial 911.

## (undated) NOTE — ED AVS SNAPSHOT
Momo Becker   MRN: [id number]    Department:  [facility] Emergency Department   Date of Visit:  8/15/2019           Disclosure     Insurance plans vary and the physician(s) referred by the ER may not be covered by your plan.  Please contact CARE PHYSICIAN AT ONCE OR RETURN IMMEDIATELY TO THE EMERGENCY DEPARTMENT. If you have been prescribed any medication(s), please fill your prescription right away and begin taking the medication(s) as directed.   If you believe that any of the medications

## (undated) NOTE — LETTER
10/27/2022          To Whom It May Concern:    Divina Mathew is currently under my medical care and may not return to work at this time. Please excuse Darion Shi for 5 days. She may return to work on 11/1/2022. Activity is restricted as follows: none. If you require additional information please contact our office. Sincerely,      Keira Herrera MD          Document generated by:   Keira Herrera MD

## (undated) NOTE — LETTER
No referring provider defined for this encounter. 11/07/17        Patient: Fredis Grant   YOB: 1975   Date of Visit: 11/7/2017       Dear  Dr. Sydnie Chacon PA,      Thank you for referring Fredis Grant to my practice.   Please fin

## (undated) NOTE — LETTER
6/18/2021              [address]         Dear Julio oRthman records indicate that the tests ordered for you by Jhonny Jean. MD Jena  have not been done.   If you have, in fact, already completed the test

## (undated) NOTE — ED AVS SNAPSHOT
Remi Vincent   MRN: [id number]    Department:  [facility] Emergency Department   Date of Visit:  10/25/2017           Disclosure     Insurance plans vary and the physician(s) referred by the ER may not be covered by your plan.  Please contact CARE PHYSICIAN AT ONCE OR RETURN IMMEDIATELY TO THE EMERGENCY DEPARTMENT. If you have been prescribed any medication(s), please fill your prescription right away and begin taking the medication(s) as directed.   If you believe that any of the medications

## (undated) NOTE — ED AVS SNAPSHOT
[facility] Emergency Department    [address]     [place] [address]    Phone:  [phone]    Fax:  [phone]           Yee Anglin   MRN: [id number]    Department:  [facility] Emergency Department   Date of Visit:  1/29 and Class Registration line at [phone] or find a doctor online by visiting [email or web address].    IF THERE IS ANY CHANGE OR WORSENING OF YOUR CONDITION, CALL YOUR PRIMARY CARE PHYSICIAN AT ONCE OR RETURN IMMEDIATELY TO [address] [place] [address].     If

## (undated) NOTE — LETTER
8/26/2024          To Whom It May Concern:    Evon Liriano is currently under my medical care and may not return to work at this time.    Please excuse Evon for 1 days.  She may return to work on tomorrow 8/27/24.  Activity is restricted as follows: none.    If you require additional information please contact our office.        Sincerely,          Bernard Bella MD          Document generated by:  Bernard Bella MD

## (undated) NOTE — Clinical Note
Good morning Can this patient be seen sooner by anyone, has an IUD (mirena) 7 yrs old. Has pelvic cramping and tender cervix, some spotting. Attempted IUD removal 2 1/2  weeks ago and since then has cramping. IUD was resistant so aborted removal. She's getting pelvic us today  Thank you!!

## (undated) NOTE — LETTER
05/11/20        Nancy Whitlock  [address]      Dear Argelia Tan,    [facility] records indicate that you have outstanding lab work and or testing that was ordered for you and has not yet been completed:    Hepatic Function Panel    To prov

## (undated) NOTE — LETTER
8/28/2018          To Whom It May Concern:    Jim Bundy is currently under my medical care and may not return to { school/work:[id number]} at this time. Please excuse Claus Mutsafa for {NUMBERS 0-10:5217} {days weeks:3323::\"days\"}.   {HE/SHE :0170} ma

## (undated) NOTE — LETTER
ASTHMA ACTION PLAN for Evon Liriano     : 1975     Date: 24  Doctor:  Bernard Bella MD  Phone for doctor or clinic: [facility], [address] [address] [phone]      ACT Score: 25    ACT Goal: 20 or greater    Call your provider if you require your rescue/quick reliever medication more than 2-3 times in a 24 hour period.    If you require your rescue inhaler/medication more than 2-3 times weekly, your asthma may not be under proper control and you should seek medical attention.    *Quick Relievers are Xopenex and Albuterol*    You can use the colors of a traffic light to help learn about your asthma medicines.  Year Round       1. Green - Go! % of Personal Best Peak Flow   Use controller medicine.   Breathing is good  No cough or wheeze  Can work and play Medicine How much to take When to take it    Medications       Sympathomimetics Instructions     albuterol 108 (90 Base) MCG/ACT Inhalation Aero Soln Inhale 2 puffs into the lungs every 6 (six) hours as needed for Wheezing or Shortness of Breath.                    2. Yellow - Caution. 50-79% Personal Best Peak Flow  Use reliever medicine to keep an asthma attack from getting bad.   Cough  Quick Relievers  Wheezing  Tight Chest  Wake up at night Medicine How much to take When to take it    If symptoms are not improving in 24-48 hrs, call office for further instructions  Medications       Sympathomimetics Instructions     albuterol 108 (90 Base) MCG/ACT Inhalation Aero Soln Inhale 2 puffs into the lungs every 6 (six) hours as needed for Wheezing or Shortness of Breath.                    3. Red - Stop! Danger! <50% Personal Best Peak Flow  Continue Controller Medications But ADD:   Medicine not helping  Breathing is hard and fast  Nose opens wide  Can't walk  Ribs show  Can't talk well Medicine How much to take When to take it    If your symptoms do not improve in ONE  hour -  go to the emergency room or call 911 immediately! If symptoms improve, call office for appointment immediately.    Albuterol inhaler 2 puffs every 20 minutes for three treatments       Don't forget:  Rinse mouth after using inhaler  Use spacer for inhaler  Remember to get your Flu vaccine every fall!    [x] Asthma Action Plan reviewed with the caregiver and patient, and a copy of the plan was given to the patient/caregiver.   [] Asthma Action Plan reviewed with the caregiver and patient on the phone, and copy mailed to patient/caregiver or sent via [employer].     Signatures:   Provider  Bernard Bella MD Patient  Evon Liriano Caretaker

## (undated) NOTE — LETTER
ASTHMA ACTION PLAN for Ben Mode     : 1975          Date: 2021    Belinda Records. MD Chandana Bella [place] [id number] [phone] 1.   GREEN - GO!  % Personal Best Peak F Patient Caretaker (if necessary)   Nannette Dinero.  MD Tessa Sims

## (undated) NOTE — LETTER
12/17/2024          To Whom It May Concern:    Evon Liriano is currently under my medical care and may not return to work at this time.      She may return to work on 12/19/2024.  Activity is restricted as follows: none.    If you require additional information please contact our office.        Sincerely,    Miguelito Ambriz PA-C          Document generated by:  Miguelito Ambriz PA-C